# Patient Record
Sex: MALE | Race: WHITE | NOT HISPANIC OR LATINO | Employment: OTHER | URBAN - METROPOLITAN AREA
[De-identification: names, ages, dates, MRNs, and addresses within clinical notes are randomized per-mention and may not be internally consistent; named-entity substitution may affect disease eponyms.]

---

## 2017-01-26 ENCOUNTER — RECORDS - HEALTHEAST (OUTPATIENT)
Dept: LAB | Facility: CLINIC | Age: 77
End: 2017-01-26

## 2017-01-26 LAB
CHOLEST SERPL-MCNC: 124 MG/DL
FASTING STATUS PATIENT QL REPORTED: YES
HDLC SERPL-MCNC: 37 MG/DL
LDLC SERPL CALC-MCNC: 75 MG/DL
TRIGL SERPL-MCNC: 61 MG/DL

## 2017-04-11 ENCOUNTER — RECORDS - HEALTHEAST (OUTPATIENT)
Dept: LAB | Facility: CLINIC | Age: 77
End: 2017-04-11

## 2017-04-11 LAB
APPEARANCE FLD: ABNORMAL
COLOR FLD: YELLOW
CRYSTALS SNV MICRO: ABNORMAL
LYMPHOCYTES NFR FLD MANUAL: 1 %
MONOCYTE % - HISTORICAL: 3 %
NEUTS BAND NFR FLD MANUAL: 97 %
RBC FLUID - HISTORICAL: ABNORMAL /UL
WBC # FLD AUTO: ABNORMAL /UL (ref 0–99)

## 2017-05-23 ENCOUNTER — AMBULATORY - HEALTHEAST (OUTPATIENT)
Dept: CARDIOLOGY | Facility: CLINIC | Age: 77
End: 2017-05-23

## 2017-05-24 ENCOUNTER — AMBULATORY - HEALTHEAST (OUTPATIENT)
Dept: CARDIOLOGY | Facility: CLINIC | Age: 77
End: 2017-05-24

## 2017-05-24 ENCOUNTER — OFFICE VISIT - HEALTHEAST (OUTPATIENT)
Dept: CARDIOLOGY | Facility: CLINIC | Age: 77
End: 2017-05-24

## 2017-05-24 DIAGNOSIS — E78.5 DYSLIPIDEMIA: ICD-10-CM

## 2017-05-24 DIAGNOSIS — I47.10 SVT (SUPRAVENTRICULAR TACHYCARDIA) (H): ICD-10-CM

## 2017-05-24 DIAGNOSIS — I10 ESSENTIAL HYPERTENSION: ICD-10-CM

## 2017-05-24 DIAGNOSIS — I25.10 CORONARY ARTERY DISEASE INVOLVING NATIVE CORONARY ARTERY OF NATIVE HEART WITHOUT ANGINA PECTORIS: ICD-10-CM

## 2017-05-24 ASSESSMENT — MIFFLIN-ST. JEOR: SCORE: 1445.61

## 2017-06-12 ENCOUNTER — COMMUNICATION - HEALTHEAST (OUTPATIENT)
Dept: CARDIOLOGY | Facility: CLINIC | Age: 77
End: 2017-06-12

## 2017-06-12 DIAGNOSIS — I10 HYPERTENSION: ICD-10-CM

## 2017-10-16 ENCOUNTER — RECORDS - HEALTHEAST (OUTPATIENT)
Dept: LAB | Facility: CLINIC | Age: 77
End: 2017-10-16

## 2017-10-16 LAB
CHOLEST SERPL-MCNC: 125 MG/DL
FASTING STATUS PATIENT QL REPORTED: YES
HDLC SERPL-MCNC: 43 MG/DL
LDLC SERPL CALC-MCNC: 73 MG/DL
TRIGL SERPL-MCNC: 43 MG/DL

## 2018-03-11 ENCOUNTER — COMMUNICATION - HEALTHEAST (OUTPATIENT)
Dept: CARDIOLOGY | Facility: CLINIC | Age: 78
End: 2018-03-11

## 2018-03-11 DIAGNOSIS — I10 HYPERTENSION: ICD-10-CM

## 2018-04-10 ENCOUNTER — OFFICE VISIT - HEALTHEAST (OUTPATIENT)
Dept: CARDIOLOGY | Facility: CLINIC | Age: 78
End: 2018-04-10

## 2018-04-10 ENCOUNTER — AMBULATORY - HEALTHEAST (OUTPATIENT)
Dept: CARDIOLOGY | Facility: CLINIC | Age: 78
End: 2018-04-10

## 2018-04-10 ENCOUNTER — RECORDS - HEALTHEAST (OUTPATIENT)
Dept: ADMINISTRATIVE | Facility: OTHER | Age: 78
End: 2018-04-10

## 2018-04-10 DIAGNOSIS — E78.5 DYSLIPIDEMIA: ICD-10-CM

## 2018-04-10 DIAGNOSIS — I25.10 CORONARY ARTERY DISEASE INVOLVING NATIVE CORONARY ARTERY OF NATIVE HEART WITHOUT ANGINA PECTORIS: ICD-10-CM

## 2018-04-10 DIAGNOSIS — I47.10 SVT (SUPRAVENTRICULAR TACHYCARDIA) (H): ICD-10-CM

## 2018-04-10 DIAGNOSIS — I10 ESSENTIAL HYPERTENSION: ICD-10-CM

## 2018-04-10 ASSESSMENT — MIFFLIN-ST. JEOR: SCORE: 1445.61

## 2018-04-16 ENCOUNTER — HOSPITAL ENCOUNTER (OUTPATIENT)
Dept: CARDIOLOGY | Facility: CLINIC | Age: 78
Discharge: HOME | End: 2018-04-16
Attending: INTERNAL MEDICINE

## 2018-04-16 DIAGNOSIS — I47.10 SVT (SUPRAVENTRICULAR TACHYCARDIA) (H): ICD-10-CM

## 2018-04-18 ENCOUNTER — RECORDS - HEALTHEAST (OUTPATIENT)
Dept: LAB | Facility: CLINIC | Age: 78
End: 2018-04-18

## 2018-04-18 LAB
ALBUMIN SERPL-MCNC: 3.5 G/DL (ref 3.5–5)
ALP SERPL-CCNC: 129 U/L (ref 45–120)
ALT SERPL W P-5'-P-CCNC: 14 U/L (ref 0–45)
ANION GAP SERPL CALCULATED.3IONS-SCNC: 8 MMOL/L (ref 5–18)
AST SERPL W P-5'-P-CCNC: 21 U/L (ref 0–40)
BILIRUB SERPL-MCNC: 0.9 MG/DL (ref 0–1)
BUN SERPL-MCNC: 21 MG/DL (ref 8–28)
CALCIUM SERPL-MCNC: 9 MG/DL (ref 8.5–10.5)
CHLORIDE BLD-SCNC: 101 MMOL/L (ref 98–107)
CHOLEST SERPL-MCNC: 108 MG/DL
CO2 SERPL-SCNC: 26 MMOL/L (ref 22–31)
CREAT SERPL-MCNC: 0.83 MG/DL (ref 0.7–1.3)
FASTING STATUS PATIENT QL REPORTED: YES
GFR SERPL CREATININE-BSD FRML MDRD: >60 ML/MIN/1.73M2
GLUCOSE BLD-MCNC: 82 MG/DL (ref 70–125)
HDLC SERPL-MCNC: 40 MG/DL
LDLC SERPL CALC-MCNC: 59 MG/DL
POTASSIUM BLD-SCNC: 5.2 MMOL/L (ref 3.5–5)
PROT SERPL-MCNC: 7 G/DL (ref 6–8)
SODIUM SERPL-SCNC: 135 MMOL/L (ref 136–145)
TRIGL SERPL-MCNC: 46 MG/DL

## 2018-05-25 ENCOUNTER — RECORDS - HEALTHEAST (OUTPATIENT)
Dept: ADMINISTRATIVE | Facility: OTHER | Age: 78
End: 2018-05-25

## 2018-05-29 ENCOUNTER — AMBULATORY - HEALTHEAST (OUTPATIENT)
Dept: CARDIOLOGY | Facility: CLINIC | Age: 78
End: 2018-05-29

## 2018-05-29 ENCOUNTER — RECORDS - HEALTHEAST (OUTPATIENT)
Dept: ADMINISTRATIVE | Facility: OTHER | Age: 78
End: 2018-05-29

## 2018-06-01 ENCOUNTER — OFFICE VISIT - HEALTHEAST (OUTPATIENT)
Dept: CARDIOLOGY | Facility: CLINIC | Age: 78
End: 2018-06-01

## 2018-06-01 DIAGNOSIS — I49.3 PVC (PREMATURE VENTRICULAR CONTRACTION): ICD-10-CM

## 2018-06-01 DIAGNOSIS — I25.10 ATHEROSCLEROSIS OF NATIVE CORONARY ARTERY OF NATIVE HEART WITHOUT ANGINA PECTORIS: ICD-10-CM

## 2018-06-01 LAB
ATRIAL RATE - MUSE: 55 BPM
DIASTOLIC BLOOD PRESSURE - MUSE: NORMAL MMHG
INTERPRETATION ECG - MUSE: NORMAL
P AXIS - MUSE: 59 DEGREES
PR INTERVAL - MUSE: 244 MS
QRS DURATION - MUSE: 96 MS
QT - MUSE: 418 MS
QTC - MUSE: 399 MS
R AXIS - MUSE: 62 DEGREES
SYSTOLIC BLOOD PRESSURE - MUSE: NORMAL MMHG
T AXIS - MUSE: 24 DEGREES
VENTRICULAR RATE- MUSE: 55 BPM

## 2018-06-01 ASSESSMENT — MIFFLIN-ST. JEOR: SCORE: 1436.54

## 2018-06-08 ENCOUNTER — COMMUNICATION - HEALTHEAST (OUTPATIENT)
Dept: CARDIOLOGY | Facility: CLINIC | Age: 78
End: 2018-06-08

## 2018-06-08 DIAGNOSIS — I10 HYPERTENSION: ICD-10-CM

## 2018-06-22 ENCOUNTER — HOSPITAL ENCOUNTER (OUTPATIENT)
Dept: NUCLEAR MEDICINE | Facility: CLINIC | Age: 78
Discharge: HOME | End: 2018-06-22
Attending: INTERNAL MEDICINE

## 2018-06-22 ENCOUNTER — HOSPITAL ENCOUNTER (OUTPATIENT)
Dept: CARDIOLOGY | Facility: CLINIC | Age: 78
Discharge: HOME | End: 2018-06-22
Attending: INTERNAL MEDICINE

## 2018-06-22 DIAGNOSIS — I25.10 ATHEROSCLEROSIS OF NATIVE CORONARY ARTERY OF NATIVE HEART WITHOUT ANGINA PECTORIS: ICD-10-CM

## 2018-06-22 DIAGNOSIS — I49.3 PVC (PREMATURE VENTRICULAR CONTRACTION): ICD-10-CM

## 2018-06-22 LAB
CV STRESS MAX HR HE: 82
NUC STRESS EJECTION FRACTION: 66 %
STRESS ECHO BASELINE BP: NORMAL MM OF HG
STRESS ECHO BASELINE HR: 64 BPM
STRESS ECHO CALCULATED PERCENT HR: 58 %
STRESS ECHO LAST STRESS BP: NORMAL MM OF HG

## 2018-06-26 ENCOUNTER — AMBULATORY - HEALTHEAST (OUTPATIENT)
Dept: CARDIOLOGY | Facility: CLINIC | Age: 78
End: 2018-06-26

## 2018-06-26 ENCOUNTER — SURGERY - HEALTHEAST (OUTPATIENT)
Dept: CARDIOLOGY | Facility: CLINIC | Age: 78
End: 2018-06-26

## 2018-06-26 DIAGNOSIS — I25.10 CAD (CORONARY ARTERY DISEASE): ICD-10-CM

## 2018-06-27 ENCOUNTER — COMMUNICATION - HEALTHEAST (OUTPATIENT)
Dept: CARDIOLOGY | Facility: CLINIC | Age: 78
End: 2018-06-27

## 2018-06-27 ENCOUNTER — SURGERY - HEALTHEAST (OUTPATIENT)
Dept: CARDIOLOGY | Facility: CLINIC | Age: 78
End: 2018-06-27

## 2018-06-27 DIAGNOSIS — I25.10 ATHEROSCLEROSIS OF NATIVE CORONARY ARTERY OF NATIVE HEART WITHOUT ANGINA PECTORIS: ICD-10-CM

## 2018-06-27 DIAGNOSIS — I49.3 PVC (PREMATURE VENTRICULAR CONTRACTION): ICD-10-CM

## 2018-06-29 ENCOUNTER — COMMUNICATION - HEALTHEAST (OUTPATIENT)
Dept: CARDIOLOGY | Facility: CLINIC | Age: 78
End: 2018-06-29

## 2018-06-29 ENCOUNTER — AMBULATORY - HEALTHEAST (OUTPATIENT)
Dept: CARDIOLOGY | Facility: CLINIC | Age: 78
End: 2018-06-29

## 2018-06-29 ENCOUNTER — SURGERY - HEALTHEAST (OUTPATIENT)
Dept: CARDIOLOGY | Facility: CLINIC | Age: 78
End: 2018-06-29

## 2018-06-29 DIAGNOSIS — R06.09 DOE (DYSPNEA ON EXERTION): ICD-10-CM

## 2018-06-29 DIAGNOSIS — I25.10 CAD (CORONARY ARTERY DISEASE): ICD-10-CM

## 2018-06-29 DIAGNOSIS — R94.39 ABNORMAL NUCLEAR STRESS TEST: ICD-10-CM

## 2018-06-29 ASSESSMENT — MIFFLIN-ST. JEOR: SCORE: 1426.56

## 2018-07-02 ENCOUNTER — COMMUNICATION - HEALTHEAST (OUTPATIENT)
Dept: CARDIOLOGY | Facility: CLINIC | Age: 78
End: 2018-07-02

## 2018-07-03 ENCOUNTER — SURGERY - HEALTHEAST (OUTPATIENT)
Dept: CARDIOLOGY | Facility: CLINIC | Age: 78
End: 2018-07-03

## 2018-07-03 ENCOUNTER — RECORDS - HEALTHEAST (OUTPATIENT)
Dept: ADMINISTRATIVE | Facility: OTHER | Age: 78
End: 2018-07-03

## 2018-07-05 ENCOUNTER — SURGERY - HEALTHEAST (OUTPATIENT)
Dept: CARDIOLOGY | Facility: CLINIC | Age: 78
End: 2018-07-05

## 2018-07-05 ASSESSMENT — MIFFLIN-ST. JEOR: SCORE: 1422.94

## 2018-07-06 ASSESSMENT — MIFFLIN-ST. JEOR: SCORE: 1407.06

## 2018-07-13 ENCOUNTER — OFFICE VISIT - HEALTHEAST (OUTPATIENT)
Dept: CARDIOLOGY | Facility: CLINIC | Age: 78
End: 2018-07-13

## 2018-07-13 DIAGNOSIS — I10 ESSENTIAL HYPERTENSION: ICD-10-CM

## 2018-07-13 DIAGNOSIS — E78.5 HYPERLIPIDEMIA LDL GOAL <70: ICD-10-CM

## 2018-07-13 DIAGNOSIS — I25.10 CAD (CORONARY ARTERY DISEASE): ICD-10-CM

## 2018-07-13 DIAGNOSIS — I25.119 CORONARY ARTERY DISEASE INVOLVING NATIVE CORONARY ARTERY OF NATIVE HEART WITH ANGINA PECTORIS (H): ICD-10-CM

## 2018-07-13 DIAGNOSIS — R06.09 DYSPNEA ON EXERTION: ICD-10-CM

## 2018-07-13 DIAGNOSIS — R94.39 ABNORMAL NUCLEAR STRESS TEST: ICD-10-CM

## 2018-07-13 ASSESSMENT — MIFFLIN-ST. JEOR: SCORE: 1427.47

## 2018-07-23 ENCOUNTER — AMBULATORY - HEALTHEAST (OUTPATIENT)
Dept: CARDIAC REHAB | Facility: CLINIC | Age: 78
End: 2018-07-23

## 2018-07-23 DIAGNOSIS — R06.09 DOE (DYSPNEA ON EXERTION): ICD-10-CM

## 2018-07-23 DIAGNOSIS — R94.39 ABNORMAL NUCLEAR STRESS TEST: ICD-10-CM

## 2018-07-23 DIAGNOSIS — I25.10 CAD (CORONARY ARTERY DISEASE): ICD-10-CM

## 2018-07-23 DIAGNOSIS — Z95.5 S/P DRUG ELUTING CORONARY STENT PLACEMENT: ICD-10-CM

## 2018-07-25 ENCOUNTER — AMBULATORY - HEALTHEAST (OUTPATIENT)
Dept: CARDIAC REHAB | Facility: CLINIC | Age: 78
End: 2018-07-25

## 2018-07-25 DIAGNOSIS — Z95.5 S/P DRUG ELUTING CORONARY STENT PLACEMENT: ICD-10-CM

## 2018-07-27 ENCOUNTER — AMBULATORY - HEALTHEAST (OUTPATIENT)
Dept: CARDIAC REHAB | Facility: CLINIC | Age: 78
End: 2018-07-27

## 2018-07-27 DIAGNOSIS — Z95.5 S/P DRUG ELUTING CORONARY STENT PLACEMENT: ICD-10-CM

## 2018-08-01 ENCOUNTER — AMBULATORY - HEALTHEAST (OUTPATIENT)
Dept: CARDIAC REHAB | Facility: CLINIC | Age: 78
End: 2018-08-01

## 2018-08-01 DIAGNOSIS — Z95.5 S/P DRUG ELUTING CORONARY STENT PLACEMENT: ICD-10-CM

## 2018-08-06 ENCOUNTER — AMBULATORY - HEALTHEAST (OUTPATIENT)
Dept: CARDIAC REHAB | Facility: CLINIC | Age: 78
End: 2018-08-06

## 2018-08-06 DIAGNOSIS — Z95.5 S/P DRUG ELUTING CORONARY STENT PLACEMENT: ICD-10-CM

## 2018-08-08 ENCOUNTER — AMBULATORY - HEALTHEAST (OUTPATIENT)
Dept: CARDIAC REHAB | Facility: CLINIC | Age: 78
End: 2018-08-08

## 2018-08-08 DIAGNOSIS — Z95.5 S/P DRUG ELUTING CORONARY STENT PLACEMENT: ICD-10-CM

## 2018-08-13 ENCOUNTER — AMBULATORY - HEALTHEAST (OUTPATIENT)
Dept: CARDIAC REHAB | Facility: CLINIC | Age: 78
End: 2018-08-13

## 2018-08-13 DIAGNOSIS — Z95.5 S/P DRUG ELUTING CORONARY STENT PLACEMENT: ICD-10-CM

## 2018-08-14 ENCOUNTER — AMBULATORY - HEALTHEAST (OUTPATIENT)
Dept: CARDIOLOGY | Facility: CLINIC | Age: 78
End: 2018-08-14

## 2018-08-14 ENCOUNTER — RECORDS - HEALTHEAST (OUTPATIENT)
Dept: ADMINISTRATIVE | Facility: OTHER | Age: 78
End: 2018-08-14

## 2018-08-15 ENCOUNTER — AMBULATORY - HEALTHEAST (OUTPATIENT)
Dept: CARDIAC REHAB | Facility: CLINIC | Age: 78
End: 2018-08-15

## 2018-08-15 DIAGNOSIS — Z95.5 S/P DRUG ELUTING CORONARY STENT PLACEMENT: ICD-10-CM

## 2018-08-16 ENCOUNTER — OFFICE VISIT - HEALTHEAST (OUTPATIENT)
Dept: CARDIOLOGY | Facility: CLINIC | Age: 78
End: 2018-08-16

## 2018-08-16 DIAGNOSIS — I25.10 CORONARY ARTERY DISEASE INVOLVING NATIVE CORONARY ARTERY OF NATIVE HEART WITHOUT ANGINA PECTORIS: ICD-10-CM

## 2018-08-16 DIAGNOSIS — I10 ESSENTIAL HYPERTENSION: ICD-10-CM

## 2018-08-16 DIAGNOSIS — E78.5 DYSLIPIDEMIA: ICD-10-CM

## 2018-08-16 DIAGNOSIS — I47.10 SVT (SUPRAVENTRICULAR TACHYCARDIA) (H): ICD-10-CM

## 2018-08-16 ASSESSMENT — MIFFLIN-ST. JEOR: SCORE: 1432.01

## 2018-08-20 ENCOUNTER — AMBULATORY - HEALTHEAST (OUTPATIENT)
Dept: CARDIAC REHAB | Facility: CLINIC | Age: 78
End: 2018-08-20

## 2018-08-20 DIAGNOSIS — Z95.5 S/P DRUG ELUTING CORONARY STENT PLACEMENT: ICD-10-CM

## 2018-08-22 ENCOUNTER — AMBULATORY - HEALTHEAST (OUTPATIENT)
Dept: CARDIAC REHAB | Facility: CLINIC | Age: 78
End: 2018-08-22

## 2018-08-22 DIAGNOSIS — Z95.5 S/P DRUG ELUTING CORONARY STENT PLACEMENT: ICD-10-CM

## 2018-08-27 ENCOUNTER — AMBULATORY - HEALTHEAST (OUTPATIENT)
Dept: CARDIAC REHAB | Facility: CLINIC | Age: 78
End: 2018-08-27

## 2018-08-27 DIAGNOSIS — Z95.5 S/P DRUG ELUTING CORONARY STENT PLACEMENT: ICD-10-CM

## 2018-08-29 ENCOUNTER — AMBULATORY - HEALTHEAST (OUTPATIENT)
Dept: CARDIAC REHAB | Facility: CLINIC | Age: 78
End: 2018-08-29

## 2018-08-29 DIAGNOSIS — Z95.5 S/P DRUG ELUTING CORONARY STENT PLACEMENT: ICD-10-CM

## 2018-09-05 ENCOUNTER — AMBULATORY - HEALTHEAST (OUTPATIENT)
Dept: CARDIAC REHAB | Facility: CLINIC | Age: 78
End: 2018-09-05

## 2018-09-05 DIAGNOSIS — Z95.5 S/P DRUG ELUTING CORONARY STENT PLACEMENT: ICD-10-CM

## 2018-09-10 ENCOUNTER — AMBULATORY - HEALTHEAST (OUTPATIENT)
Dept: CARDIAC REHAB | Facility: CLINIC | Age: 78
End: 2018-09-10

## 2018-09-10 DIAGNOSIS — Z95.5 S/P DRUG ELUTING CORONARY STENT PLACEMENT: ICD-10-CM

## 2018-09-12 ENCOUNTER — AMBULATORY - HEALTHEAST (OUTPATIENT)
Dept: CARDIAC REHAB | Facility: CLINIC | Age: 78
End: 2018-09-12

## 2018-09-12 DIAGNOSIS — Z95.5 S/P DRUG ELUTING CORONARY STENT PLACEMENT: ICD-10-CM

## 2018-09-17 ENCOUNTER — AMBULATORY - HEALTHEAST (OUTPATIENT)
Dept: CARDIAC REHAB | Facility: CLINIC | Age: 78
End: 2018-09-17

## 2018-09-17 DIAGNOSIS — Z95.5 S/P DRUG ELUTING CORONARY STENT PLACEMENT: ICD-10-CM

## 2018-09-19 ENCOUNTER — AMBULATORY - HEALTHEAST (OUTPATIENT)
Dept: CARDIAC REHAB | Facility: CLINIC | Age: 78
End: 2018-09-19

## 2018-09-19 DIAGNOSIS — Z95.5 S/P DRUG ELUTING CORONARY STENT PLACEMENT: ICD-10-CM

## 2018-09-24 ENCOUNTER — AMBULATORY - HEALTHEAST (OUTPATIENT)
Dept: CARDIAC REHAB | Facility: CLINIC | Age: 78
End: 2018-09-24

## 2018-09-24 DIAGNOSIS — Z95.5 S/P DRUG ELUTING CORONARY STENT PLACEMENT: ICD-10-CM

## 2018-09-26 ENCOUNTER — AMBULATORY - HEALTHEAST (OUTPATIENT)
Dept: CARDIAC REHAB | Facility: CLINIC | Age: 78
End: 2018-09-26

## 2018-09-26 DIAGNOSIS — Z95.5 S/P DRUG ELUTING CORONARY STENT PLACEMENT: ICD-10-CM

## 2018-10-01 ENCOUNTER — AMBULATORY - HEALTHEAST (OUTPATIENT)
Dept: CARDIAC REHAB | Facility: CLINIC | Age: 78
End: 2018-10-01

## 2018-10-01 DIAGNOSIS — Z95.5 S/P DRUG ELUTING CORONARY STENT PLACEMENT: ICD-10-CM

## 2018-10-03 ENCOUNTER — AMBULATORY - HEALTHEAST (OUTPATIENT)
Dept: CARDIAC REHAB | Facility: CLINIC | Age: 78
End: 2018-10-03

## 2018-10-03 DIAGNOSIS — Z95.5 S/P DRUG ELUTING CORONARY STENT PLACEMENT: ICD-10-CM

## 2018-10-25 ENCOUNTER — COMMUNICATION - HEALTHEAST (OUTPATIENT)
Dept: ADMINISTRATIVE | Facility: CLINIC | Age: 78
End: 2018-10-25

## 2018-10-31 ENCOUNTER — COMMUNICATION - HEALTHEAST (OUTPATIENT)
Dept: CARDIOLOGY | Facility: CLINIC | Age: 78
End: 2018-10-31

## 2018-10-31 DIAGNOSIS — I47.10 PAROXYSMAL SUPRAVENTRICULAR TACHYCARDIA (H): ICD-10-CM

## 2018-12-04 ENCOUNTER — RECORDS - HEALTHEAST (OUTPATIENT)
Dept: LAB | Facility: CLINIC | Age: 78
End: 2018-12-04

## 2018-12-04 LAB
ALBUMIN SERPL-MCNC: 3.8 G/DL (ref 3.5–5)
ALP SERPL-CCNC: 114 U/L (ref 45–120)
ALT SERPL W P-5'-P-CCNC: 17 U/L (ref 0–45)
ANION GAP SERPL CALCULATED.3IONS-SCNC: 7 MMOL/L (ref 5–18)
AST SERPL W P-5'-P-CCNC: 21 U/L (ref 0–40)
BILIRUB SERPL-MCNC: 0.9 MG/DL (ref 0–1)
BUN SERPL-MCNC: 13 MG/DL (ref 8–28)
CALCIUM SERPL-MCNC: 9.2 MG/DL (ref 8.5–10.5)
CHLORIDE BLD-SCNC: 100 MMOL/L (ref 98–107)
CHOLEST SERPL-MCNC: 115 MG/DL
CO2 SERPL-SCNC: 27 MMOL/L (ref 22–31)
CREAT SERPL-MCNC: 0.78 MG/DL (ref 0.7–1.3)
DIGOXIN LEVEL LHE- HISTORICAL: <0.3 NG/ML (ref 0.5–2)
FASTING STATUS PATIENT QL REPORTED: YES
GFR SERPL CREATININE-BSD FRML MDRD: >60 ML/MIN/1.73M2
GLUCOSE BLD-MCNC: 92 MG/DL (ref 70–125)
HDLC SERPL-MCNC: 41 MG/DL
LDLC SERPL CALC-MCNC: 63 MG/DL
POTASSIUM BLD-SCNC: 4.5 MMOL/L (ref 3.5–5)
PROT SERPL-MCNC: 7 G/DL (ref 6–8)
SODIUM SERPL-SCNC: 134 MMOL/L (ref 136–145)
TRIGL SERPL-MCNC: 54 MG/DL

## 2019-01-16 ENCOUNTER — OFFICE VISIT - HEALTHEAST (OUTPATIENT)
Dept: CARDIOLOGY | Facility: CLINIC | Age: 79
End: 2019-01-16

## 2019-01-16 DIAGNOSIS — I25.119 CORONARY ARTERY DISEASE INVOLVING NATIVE CORONARY ARTERY OF NATIVE HEART WITH ANGINA PECTORIS (H): ICD-10-CM

## 2019-01-16 DIAGNOSIS — I49.3 PVC (PREMATURE VENTRICULAR CONTRACTION): ICD-10-CM

## 2019-01-16 DIAGNOSIS — I47.10 SVT (SUPRAVENTRICULAR TACHYCARDIA) (H): ICD-10-CM

## 2019-01-16 ASSESSMENT — MIFFLIN-ST. JEOR: SCORE: 1440.63

## 2019-01-17 ENCOUNTER — COMMUNICATION - HEALTHEAST (OUTPATIENT)
Dept: CARDIOLOGY | Facility: CLINIC | Age: 79
End: 2019-01-17

## 2019-01-17 ENCOUNTER — AMBULATORY - HEALTHEAST (OUTPATIENT)
Dept: CARDIOLOGY | Facility: CLINIC | Age: 79
End: 2019-01-17

## 2019-01-17 DIAGNOSIS — I47.10 SVT (SUPRAVENTRICULAR TACHYCARDIA) (H): ICD-10-CM

## 2019-01-17 DIAGNOSIS — I48.91 A-FIB (H): ICD-10-CM

## 2019-01-18 ENCOUNTER — COMMUNICATION - HEALTHEAST (OUTPATIENT)
Dept: CARDIOLOGY | Facility: CLINIC | Age: 79
End: 2019-01-18

## 2019-01-18 DIAGNOSIS — I47.10 SVT (SUPRAVENTRICULAR TACHYCARDIA) (H): ICD-10-CM

## 2019-03-10 ENCOUNTER — COMMUNICATION - HEALTHEAST (OUTPATIENT)
Dept: CARDIOLOGY | Facility: CLINIC | Age: 79
End: 2019-03-10

## 2019-03-10 DIAGNOSIS — I10 HYPERTENSION: ICD-10-CM

## 2019-03-29 ENCOUNTER — RECORDS - HEALTHEAST (OUTPATIENT)
Dept: ADMINISTRATIVE | Facility: OTHER | Age: 79
End: 2019-03-29

## 2019-03-29 ENCOUNTER — AMBULATORY - HEALTHEAST (OUTPATIENT)
Dept: CARDIOLOGY | Facility: CLINIC | Age: 79
End: 2019-03-29

## 2019-04-10 ENCOUNTER — OFFICE VISIT - HEALTHEAST (OUTPATIENT)
Dept: CARDIOLOGY | Facility: CLINIC | Age: 79
End: 2019-04-10

## 2019-04-10 DIAGNOSIS — I25.119 CORONARY ARTERY DISEASE INVOLVING NATIVE CORONARY ARTERY OF NATIVE HEART WITH ANGINA PECTORIS (H): ICD-10-CM

## 2019-04-10 DIAGNOSIS — I49.3 PVC (PREMATURE VENTRICULAR CONTRACTION): ICD-10-CM

## 2019-04-10 DIAGNOSIS — I47.10 SVT (SUPRAVENTRICULAR TACHYCARDIA) (H): ICD-10-CM

## 2019-04-10 ASSESSMENT — MIFFLIN-ST. JEOR: SCORE: 1432.01

## 2019-04-11 ENCOUNTER — AMBULATORY - HEALTHEAST (OUTPATIENT)
Dept: CARDIOLOGY | Facility: CLINIC | Age: 79
End: 2019-04-11

## 2019-04-12 ENCOUNTER — AMBULATORY - HEALTHEAST (OUTPATIENT)
Dept: CARDIOLOGY | Facility: CLINIC | Age: 79
End: 2019-04-12

## 2019-04-12 ENCOUNTER — SURGERY - HEALTHEAST (OUTPATIENT)
Dept: CARDIOLOGY | Facility: CLINIC | Age: 79
End: 2019-04-12

## 2019-04-12 DIAGNOSIS — I47.10 SVT (SUPRAVENTRICULAR TACHYCARDIA) (H): ICD-10-CM

## 2019-04-18 ENCOUNTER — COMMUNICATION - HEALTHEAST (OUTPATIENT)
Dept: CARDIOLOGY | Facility: CLINIC | Age: 79
End: 2019-04-18

## 2019-04-26 ENCOUNTER — COMMUNICATION - HEALTHEAST (OUTPATIENT)
Dept: CARDIOLOGY | Facility: CLINIC | Age: 79
End: 2019-04-26

## 2019-05-07 ENCOUNTER — SURGERY - HEALTHEAST (OUTPATIENT)
Dept: CARDIOLOGY | Facility: CLINIC | Age: 79
End: 2019-05-07

## 2019-05-07 ASSESSMENT — MIFFLIN-ST. JEOR: SCORE: 1441.99

## 2019-06-11 ENCOUNTER — RECORDS - HEALTHEAST (OUTPATIENT)
Dept: LAB | Facility: CLINIC | Age: 79
End: 2019-06-11

## 2019-06-11 LAB
ALBUMIN SERPL-MCNC: 3.8 G/DL (ref 3.5–5)
ALP SERPL-CCNC: 106 U/L (ref 45–120)
ALT SERPL W P-5'-P-CCNC: 17 U/L (ref 0–45)
ANION GAP SERPL CALCULATED.3IONS-SCNC: 11 MMOL/L (ref 5–18)
AST SERPL W P-5'-P-CCNC: 21 U/L (ref 0–40)
BILIRUB SERPL-MCNC: 0.8 MG/DL (ref 0–1)
BUN SERPL-MCNC: 18 MG/DL (ref 8–28)
CALCIUM SERPL-MCNC: 9.2 MG/DL (ref 8.5–10.5)
CHLORIDE BLD-SCNC: 102 MMOL/L (ref 98–107)
CHOLEST SERPL-MCNC: 116 MG/DL
CO2 SERPL-SCNC: 24 MMOL/L (ref 22–31)
CREAT SERPL-MCNC: 0.84 MG/DL (ref 0.7–1.3)
FASTING STATUS PATIENT QL REPORTED: YES
GFR SERPL CREATININE-BSD FRML MDRD: >60 ML/MIN/1.73M2
GLUCOSE BLD-MCNC: 78 MG/DL (ref 70–125)
HDLC SERPL-MCNC: 41 MG/DL
LDLC SERPL CALC-MCNC: 63 MG/DL
POTASSIUM BLD-SCNC: 4.6 MMOL/L (ref 3.5–5)
PROT SERPL-MCNC: 7 G/DL (ref 6–8)
SODIUM SERPL-SCNC: 137 MMOL/L (ref 136–145)
TRIGL SERPL-MCNC: 59 MG/DL

## 2019-07-05 ENCOUNTER — RECORDS - HEALTHEAST (OUTPATIENT)
Dept: ADMINISTRATIVE | Facility: OTHER | Age: 79
End: 2019-07-05

## 2019-07-05 ENCOUNTER — AMBULATORY - HEALTHEAST (OUTPATIENT)
Dept: CARDIOLOGY | Facility: CLINIC | Age: 79
End: 2019-07-05

## 2019-07-08 ENCOUNTER — OFFICE VISIT - HEALTHEAST (OUTPATIENT)
Dept: CARDIOLOGY | Facility: CLINIC | Age: 79
End: 2019-07-08

## 2019-07-08 DIAGNOSIS — I10 ESSENTIAL HYPERTENSION: ICD-10-CM

## 2019-07-08 DIAGNOSIS — I25.10 CORONARY ARTERY DISEASE INVOLVING NATIVE CORONARY ARTERY OF NATIVE HEART WITHOUT ANGINA PECTORIS: ICD-10-CM

## 2019-07-08 DIAGNOSIS — I47.10 SVT (SUPRAVENTRICULAR TACHYCARDIA) (H): ICD-10-CM

## 2019-07-08 ASSESSMENT — MIFFLIN-ST. JEOR: SCORE: 1448.16

## 2019-11-23 ENCOUNTER — COMMUNICATION - HEALTHEAST (OUTPATIENT)
Dept: CARDIOLOGY | Facility: CLINIC | Age: 79
End: 2019-11-23

## 2019-11-23 DIAGNOSIS — I10 HYPERTENSION: ICD-10-CM

## 2019-12-12 ENCOUNTER — RECORDS - HEALTHEAST (OUTPATIENT)
Dept: LAB | Facility: CLINIC | Age: 79
End: 2019-12-12

## 2019-12-12 LAB
ALBUMIN SERPL-MCNC: 3.7 G/DL (ref 3.5–5)
ALP SERPL-CCNC: 120 U/L (ref 45–120)
ALT SERPL W P-5'-P-CCNC: 16 U/L (ref 0–45)
ANION GAP SERPL CALCULATED.3IONS-SCNC: 10 MMOL/L (ref 5–18)
AST SERPL W P-5'-P-CCNC: 23 U/L (ref 0–40)
BILIRUB SERPL-MCNC: 0.9 MG/DL (ref 0–1)
BUN SERPL-MCNC: 19 MG/DL (ref 8–28)
CALCIUM SERPL-MCNC: 8.8 MG/DL (ref 8.5–10.5)
CHLORIDE BLD-SCNC: 103 MMOL/L (ref 98–107)
CHOLEST SERPL-MCNC: 105 MG/DL
CO2 SERPL-SCNC: 23 MMOL/L (ref 22–31)
CREAT SERPL-MCNC: 0.96 MG/DL (ref 0.7–1.3)
FASTING STATUS PATIENT QL REPORTED: YES
GFR SERPL CREATININE-BSD FRML MDRD: >60 ML/MIN/1.73M2
GLUCOSE BLD-MCNC: 81 MG/DL (ref 70–125)
HDLC SERPL-MCNC: 38 MG/DL
LDLC SERPL CALC-MCNC: 57 MG/DL
POTASSIUM BLD-SCNC: 4.7 MMOL/L (ref 3.5–5)
PROT SERPL-MCNC: 6.7 G/DL (ref 6–8)
SODIUM SERPL-SCNC: 136 MMOL/L (ref 136–145)
TRIGL SERPL-MCNC: 50 MG/DL

## 2020-02-22 ENCOUNTER — COMMUNICATION - HEALTHEAST (OUTPATIENT)
Dept: CARDIOLOGY | Facility: CLINIC | Age: 80
End: 2020-02-22

## 2020-02-22 DIAGNOSIS — I10 HYPERTENSION: ICD-10-CM

## 2020-06-16 ENCOUNTER — RECORDS - HEALTHEAST (OUTPATIENT)
Dept: LAB | Facility: CLINIC | Age: 80
End: 2020-06-16

## 2020-06-16 LAB
ANION GAP SERPL CALCULATED.3IONS-SCNC: 7 MMOL/L (ref 5–18)
BUN SERPL-MCNC: 17 MG/DL (ref 8–28)
CALCIUM SERPL-MCNC: 9 MG/DL (ref 8.5–10.5)
CHLORIDE BLD-SCNC: 100 MMOL/L (ref 98–107)
CO2 SERPL-SCNC: 28 MMOL/L (ref 22–31)
CREAT SERPL-MCNC: 0.89 MG/DL (ref 0.7–1.3)
GFR SERPL CREATININE-BSD FRML MDRD: >60 ML/MIN/1.73M2
GLUCOSE BLD-MCNC: 84 MG/DL (ref 70–125)
POTASSIUM BLD-SCNC: 4.9 MMOL/L (ref 3.5–5)
SODIUM SERPL-SCNC: 135 MMOL/L (ref 136–145)

## 2020-07-07 ENCOUNTER — RECORDS - HEALTHEAST (OUTPATIENT)
Dept: ADMINISTRATIVE | Facility: OTHER | Age: 80
End: 2020-07-07

## 2020-07-07 ENCOUNTER — AMBULATORY - HEALTHEAST (OUTPATIENT)
Dept: CARDIOLOGY | Facility: CLINIC | Age: 80
End: 2020-07-07

## 2020-07-08 ENCOUNTER — OFFICE VISIT - HEALTHEAST (OUTPATIENT)
Dept: CARDIOLOGY | Facility: CLINIC | Age: 80
End: 2020-07-08

## 2020-07-08 DIAGNOSIS — E78.5 DYSLIPIDEMIA: ICD-10-CM

## 2020-07-08 DIAGNOSIS — I25.10 CORONARY ARTERY DISEASE INVOLVING NATIVE CORONARY ARTERY OF NATIVE HEART WITHOUT ANGINA PECTORIS: ICD-10-CM

## 2020-07-08 DIAGNOSIS — I47.10 SVT (SUPRAVENTRICULAR TACHYCARDIA) (H): ICD-10-CM

## 2020-07-08 DIAGNOSIS — I10 ESSENTIAL HYPERTENSION: ICD-10-CM

## 2020-09-07 ENCOUNTER — COMMUNICATION - HEALTHEAST (OUTPATIENT)
Dept: CARDIOLOGY | Facility: CLINIC | Age: 80
End: 2020-09-07

## 2020-09-07 DIAGNOSIS — I10 HYPERTENSION: ICD-10-CM

## 2020-12-14 ENCOUNTER — RECORDS - HEALTHEAST (OUTPATIENT)
Dept: LAB | Facility: CLINIC | Age: 80
End: 2020-12-14

## 2020-12-14 LAB
ALBUMIN SERPL-MCNC: 4 G/DL (ref 3.5–5)
ALP SERPL-CCNC: 110 U/L (ref 45–120)
ALT SERPL W P-5'-P-CCNC: 20 U/L (ref 0–45)
ANION GAP SERPL CALCULATED.3IONS-SCNC: 10 MMOL/L (ref 5–18)
AST SERPL W P-5'-P-CCNC: 22 U/L (ref 0–40)
BILIRUB SERPL-MCNC: 0.9 MG/DL (ref 0–1)
BUN SERPL-MCNC: 18 MG/DL (ref 8–28)
CALCIUM SERPL-MCNC: 8.8 MG/DL (ref 8.5–10.5)
CHLORIDE BLD-SCNC: 100 MMOL/L (ref 98–107)
CHOLEST SERPL-MCNC: 119 MG/DL
CO2 SERPL-SCNC: 26 MMOL/L (ref 22–31)
CREAT SERPL-MCNC: 0.83 MG/DL (ref 0.7–1.3)
FASTING STATUS PATIENT QL REPORTED: YES
GFR SERPL CREATININE-BSD FRML MDRD: >60 ML/MIN/1.73M2
GLUCOSE BLD-MCNC: 89 MG/DL (ref 70–125)
HDLC SERPL-MCNC: 42 MG/DL
LDLC SERPL CALC-MCNC: 64 MG/DL
POTASSIUM BLD-SCNC: 5.2 MMOL/L (ref 3.5–5)
PROT SERPL-MCNC: 7.3 G/DL (ref 6–8)
SODIUM SERPL-SCNC: 136 MMOL/L (ref 136–145)
TRIGL SERPL-MCNC: 63 MG/DL

## 2021-03-01 ENCOUNTER — COMMUNICATION - HEALTHEAST (OUTPATIENT)
Dept: CARDIOLOGY | Facility: CLINIC | Age: 81
End: 2021-03-01

## 2021-03-01 DIAGNOSIS — I10 HYPERTENSION: ICD-10-CM

## 2021-03-02 RX ORDER — METOPROLOL TARTRATE 25 MG/1
TABLET, FILM COATED ORAL
Qty: 180 TABLET | Refills: 1 | Status: SHIPPED | OUTPATIENT
Start: 2021-03-02 | End: 2021-08-24

## 2021-05-26 ENCOUNTER — RECORDS - HEALTHEAST (OUTPATIENT)
Dept: ADMINISTRATIVE | Facility: CLINIC | Age: 81
End: 2021-05-26

## 2021-05-27 NOTE — PROGRESS NOTES
1940  Home:[phone] (home) Cell:There is no such number on file (mobile).  Emergency Contact: Herminia Butler [phone]    +++Important patient information for CSC/Cath Lab staff : None+++    [facility] EP Cath Lab Procedure Order   Ablation Type:  Supraventricular Tachycardia  Specific location of pathway: N/A     Diagnosis:  SVT  Anticipated Case Duration:  Standard   Scheduling Needs/Timeframe:  Next Available Please call pt to schedule    MD Preference: Dr Autumn HER Assist:  No Specific MD:  N/A    Current Device: None None  Device Company/Device Rep Needed for Procedure: None    Pre-Procedural Testing needed: None  Mapping System Required:  FADI  ICE Needed:  No  Special equipment/Staff needed for case: None  Anesthesia:  Conscious Sedation  Research Protocol:  No    [facility] EP Cath Lab Prep   Ordering Provider: Dr Leyva  Ordering Date: 4/12/2019  Orders Status: Intial order placed and Order set placed    H&P:  Compled by Dr. Doan on 4/10/19 if scheduled within 30 days, pt to schedule with PMD if procedure outside of this timeframe  PCP: Nikolai Monroy MD, [phone]    Pre-op Labs: Ordered AM of procedure    Medical Records Pertinent for Procedure:  Angiogram 7/5/18- LUCILLE x3 to RCA, Echo 3/26/17 EF 55% and EKG 6/5/18 SB 1st degree AVB    Patient Education:    Teach with Patient: Will be completed via phone prior to procedure, and letter was also sent to pt via mail/[employer] with written pre-procedural instructions.    Risks Reviewed:        Cardiac Catheter Ablation    <1% risk for the following: hypotension, hemorrhage, vascular injury including perforation of vein, artery or heart, thrombophlebitis, systemic or pulmonic emboli; cardiac perforation, (tamponade), infection, pneumothorax, arrhythmias, proarrhythmic effects of drugs, radiation exposure.    1-2% complete heart block (for AVNRT or septol accessory pathway).    <0.5% CVA or MI    <0.1% death    If external defibrillation is needed, 75% risk  for superficial burn.    1-2% tamponade and aortic puncture with left sided transeptal approach for left side FADI - increase risk of CVA to <2%.    Late arrhythmia recurrences depends upon the primary rhythm disturbance.      Consent: Will be obtained in [facility] day of procedure    Pre-Procedure Instructions that were Reviewed with Patient:  NPO after midnight, Remove all jewelry prior to coming in for procedure, Shower prior to arrival, Notified patient of time and date of procedure by CV , Transportation arrangements needed s/p procedure, Post-procedure follow up process, Sedation plan/orders and Pre-procedure letter was sent to pt by CV     Pre-Procedure Medication Instructions:  Instructions given to pt regarding anticoagulants: Pt is not on an anticoagulant- N/A  Instructions given to pt regarding antiarrhythmic medication: beta-blocker and diltiazem; Pt instructed to hold 1 days prior to procedure  Instructions for medication, other than anticoagulants/antiarrhythmics listed above, given to pt: to hold diltiazem and metoprolol the morning of procedure, and to take remaining medications with small sips of water    Allergies   Allergen Reactions     Amiodarone Other (See Comments)     Fatigue, shaking     Amiodarone Analogues Myalgia       Current Outpatient Medications:      aspirin 81 MG EC tablet, Take 1 tablet (81 mg total) by mouth daily., Disp: , Rfl:      atorvastatin (LIPITOR) 80 MG tablet, Take 80 mg by mouth daily., Disp: , Rfl:      betamethasone dipropionate (DIPROLENE) 0.05 % cream, Apply 1 application topically 2 (two) times a day as needed., Disp: , Rfl:      clopidogrel (PLAVIX) 75 mg tablet, Take 1 tablet (75 mg total) by mouth daily., Disp: 90 tablet, Rfl: 3     desonide (DESOWEN) 0.05 % cream, Apply topically 2 (two) times a day as needed., Disp: , Rfl:      diltiazem (CARDIZEM CD) 180 MG 24 hr capsule, Take 1 capsule (180 mg total) by mouth daily., Disp: 60 capsule, Rfl: 5      fluticasone (FLONASE) 50 mcg/actuation nasal spray, Apply 2 sprays into each nostril every evening. , Disp: , Rfl:      glucosamine sulfate (GLUCOSAMINE) 750 mg Tab, Take 1,500 mg by mouth daily. , Disp: , Rfl:      ipratropium (ATROVENT) 0.06 % nasal spray, Apply 2 sprays into each nostril 2 (two) times a day., Disp: , Rfl:      melatonin 3 mg Tab, Take 6 mg by mouth bedtime. , Disp: , Rfl:      methylcellulose (CITRUCEL), Take 2 g by mouth daily. (1 tablespoons), Disp: , Rfl:      metoprolol tartrate (LOPRESSOR) 25 MG tablet, Take 25 mg by mouth 2 (two) times a day with meals., Disp: , Rfl:      metoprolol tartrate (LOPRESSOR) 25 MG tablet, TAKE ONE TABLET BY MOUTH TWICE DAILY, Disp: 180 tablet, Rfl: 2     multivitamin (MULTIVITAMIN) per tablet, Take 1 tablet by mouth daily., Disp: , Rfl:      nitroglycerin (NITROSTAT) 0.4 MG SL tablet, Place 0.4 mg under the tongue every 5 (five) minutes as needed for chest pain., Disp: , Rfl:      omeprazole (PRILOSEC) 20 MG capsule, Take 20 mg by mouth daily., Disp: , Rfl:      sodium chloride (OCEAN) 0.65 % nasal spray, Apply 2 sprays into each nostril every morning., Disp: , Rfl:      vitamin A-vitamin C-vit E-min (OCUVITE) Tab tablet, Take 1 tablet by mouth daily., Disp: , Rfl:     Documentation Date:4/12/2019 8:48 AM  Era Mcmahon RN

## 2021-05-27 NOTE — PROGRESS NOTES
[facility] Heart Care Note    Assessment:  PVCs associated with palpitations       Holter monitors in the past have shown up to 14,000 PVCs per day       Was intolerant to empiric treatment with sotalol, amiodarone  PSVT; slow pathway modification around 2010; AVNRT  Recurrence of SVT-unusual HR = 110 bpm.  This may represent unusual pattern of AVNRT considering the slow rate  Coronary artery disease with history of myocardial infarction 1997 with two-vessel bypass graft including bypass to LAD and right coronary artery  Complex intervention of distal right coronary artery via ESPERANZA; 3 drug-eluting stents July 5, 2018  Sinus bradycardia  Preserved left ventricular function; echocardiogram March 26, 2017 showed normal ejection fraction  Excellent lipid control on high-dose atorvastatin      Plan:    Recurrent episodes of tachycardia probably represent supraventricular tachycardia and a recurrence of AVNRT  Increase diltiazem from 120 mg daily to 180 mg daily-new prescription  I think he would be a good candidate for comprehensive electrophysiologic study with ablation  We will ask the EP nurses to call you about scheduling this procedure  We will see if Dr. Leyva is available  If your tachycardia persists, he should go to the emergency room for treatment  Otherwise continue with your termination methods that have been successful          Subjective:    I had the opportunity to see.Damian Butler , who is a 78 y.o. male with a known history of SVT  He has had recurrent episodes of abrupt onset of tachycardia he had about 8 episodes since January  Episodes are on predictable unprovoked, abrupt onset and can generally be terminated with Valsalva seem to last less than 15 minutes  During tachycardia he does not feel to distress no syncopal or near syncopal episodes but does not feel real confident not sure that he can walk or do much activity during the episodes  Seem to have some improvement while taking Cardizem 120 mg  daily  No improvement with Lanoxin which was subsequently stopped  We discussed that tachycardia  Try empiric treatment and increase diltiazem watching for excessive bradycardia or hypotension  Also discussed repeat electrophysiologic study.  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 based on analysis of previous tracings although the rate of 110 bpm is certainly unusual    Comprehensive laboratory evaluation December 4, 2018 showed normal electrolytes, creatinine 0.74  Liver panel normal  Cholesterol 115 with LDL 63              Problem List:  Patient Active Problem List   Diagnosis     Hyperlipidemia LDL goal <70     SVT (supraventricular tachycardia) (H)     Chronic obstructive pulmonary disease, unspecified COPD type (H)     PVC (premature ventricular contraction)     CAD (coronary artery disease)     Dyspnea on exertion     GUERRERO (dyspnea on exertion)     Abnormal nuclear stress test     Essential hypertension     Medical History:  Past Medical History:   Diagnosis Date     Anxiety      Arrhythmia     pvc and vt     Basal cell carcinoma     chin     COPD (chronic obstructive pulmonary disease) (H)      Coronary artery disease      GERD (gastroesophageal reflux disease)      Herniated disc      Hyperlipidemia      Hypertension      Myocardial infarction (H) 1997     PVC (premature ventricular contraction)      Rhinitis      SVT (supraventricular tachycardia) (H)      Syncope      Surgical History:  Past Surgical History:   Procedure Laterality Date     BACK SURGERY       CARDIAC ELECTROPHYSIOLOGY MAPPING AND ABLATION       CORONARY ARTERY BYPASS GRAFT  1997    Comments: X 2 LIMA to LAD ESPERANZA to RCA     CV CORONARY ANGIOGRAM N/A 6/29/2018    Procedure: Coronary Angiogram;  Surgeon: Carito Flannery MD;  Location: [facility] Cath Lab;  Service:      CV CORONARY ANGIOGRAM N/A 7/5/2018    Procedure: Coronary Angiogram;  Surgeon: Carito Flannery MD;  Location: [facility] Cath Lab;  Service:      CV  LEFT HEART CATHETERIZATION WO LEFT VETRICULOGRAM Left 2018    Procedure: Left Heart Catheterization Without Left Ventriculogram;  Surgeon: Carito Flannery MD;  Location: [facility] Cath Lab;  Service:      HAND SURGERY       HERNIA REPAIR       MOHS SURGERY       MD ABLATE HEART DYSRHYTHM FOCUS      Description: Catheter Ablation Atrial Supraventricular Tachycardia;  Proc Date: 2010;  Comments: AVNRT  cryoablation     MD REPAIR EPIGASTRIC HERNIA,REDUC N/A 2014    Procedure: EPIGASTRIC INCISIONAL HERNIA REPAIR ;  Surgeon: Daniel Gutierrez MD;  Location: [facility] OR;  Service: General     TONSILLECTOMY       Social History:  Social History     Socioeconomic History     Marital status:      Spouse name: Not on file     Number of children: Not on file     Years of education: Not on file     Highest education level: Not on file   Occupational History     Not on file   Social Needs     Financial resource strain: Not on file     Food insecurity:     Worry: Not on file     Inability: Not on file     Transportation needs:     Medical: Not on file     Non-medical: Not on file   Tobacco Use     Smoking status: Former Smoker     Last attempt to quit: 1973     Years since quittin.3     Smokeless tobacco: Never Used   Substance and Sexual Activity     Alcohol use: Yes     Alcohol/week: 0.6 oz     Types: 1 Glasses of wine per week     Comment: daily with dinner     Drug use: No     Sexual activity: Not on file   Lifestyle     Physical activity:     Days per week: Not on file     Minutes per session: Not on file     Stress: Not on file   Relationships     Social connections:     Talks on phone: Not on file     Gets together: Not on file     Attends [employer] service: Not on file     Active member of club or organization: Not on file     Attends meetings of clubs or organizations: Not on file     Relationship status: Not on file     Intimate partner violence:     Fear of current or ex partner:  Not on file     Emotionally abused: Not on file     Physically abused: Not on file     Forced sexual activity: Not on file   Other Topics Concern     Not on file   Social History Narrative     Not on file       Review of Systems:      General: WNL  Eyes: WNL  Ears/Nose/Throat: WNL  Lungs: Shortness of Breath  Heart: Shortness of Breath with activity, Irregular Heartbeat, Leg Swelling(palpitations)  Stomach: WNL  Bladder: Frequent Urination at Night  Muscle/Joints: WNL  Skin: WNL  Nervous System: WNL  Mental Health: WNL     Blood: Easy Bruising        Family History:  Family History   Problem Relation Age of Onset     Hypertension Father          Allergies:  Allergies   Allergen Reactions     Amiodarone Other (See Comments)     Fatigue, shaking     Amiodarone Analogues Myalgia       Medications:  Current Outpatient Medications   Medication Sig Dispense Refill     aspirin 81 MG EC tablet Take 1 tablet (81 mg total) by mouth daily.       atorvastatin (LIPITOR) 80 MG tablet Take 80 mg by mouth daily.       betamethasone dipropionate (DIPROLENE) 0.05 % cream Apply 1 application topically 2 (two) times a day as needed.       clopidogrel (PLAVIX) 75 mg tablet Take 1 tablet (75 mg total) by mouth daily. 90 tablet 3     desonide (DESOWEN) 0.05 % cream Apply topically 2 (two) times a day as needed.       fluticasone (FLONASE) 50 mcg/actuation nasal spray Apply 2 sprays into each nostril every evening.        glucosamine sulfate (GLUCOSAMINE) 750 mg Tab Take 1,500 mg by mouth daily.        ipratropium (ATROVENT) 0.06 % nasal spray Apply 2 sprays into each nostril 2 (two) times a day.       melatonin 3 mg Tab Take 6 mg by mouth bedtime.        methylcellulose (CITRUCEL) Take 2 g by mouth daily. (1 tablespoons)       metoprolol tartrate (LOPRESSOR) 25 MG tablet Take 25 mg by mouth 2 (two) times a day with meals.       metoprolol tartrate (LOPRESSOR) 25 MG tablet TAKE ONE TABLET BY MOUTH TWICE DAILY 180 tablet 2     multivitamin  "(MULTIVITAMIN) per tablet Take 1 tablet by mouth daily.       nitroglycerin (NITROSTAT) 0.4 MG SL tablet Place 0.4 mg under the tongue every 5 (five) minutes as needed for chest pain.       omeprazole (PRILOSEC) 20 MG capsule Take 20 mg by mouth daily.       sodium chloride (OCEAN) 0.65 % nasal spray Apply 2 sprays into each nostril every morning.       vitamin A-vitamin C-vit E-min (OCUVITE) Tab tablet Take 1 tablet by mouth daily.       diltiazem (CARDIZEM CD) 180 MG 24 hr capsule Take 1 capsule (180 mg total) by mouth daily. 60 capsule 5     No current facility-administered medications for this visit.        Objective:   Vital signs:  /64 (Patient Site: Right Leg, Patient Position: Sitting, Cuff Size: Adult Regular)   Pulse (!) 58   Resp 16   Ht 5' 8\" (1.727 m)   Wt 167 lb (75.8 kg)   BMI 25.39 kg/m        Physical Exam:    GENERAL APPEARANCE: Alert, cooperative and in no acute distress.  HEENT: No scleral icterus. No Xanthelasma. Oral mucous membranes pink and moist.  NECK: JVP normal cm. No Hepatojugular reflux. Thyroid not  Palpable  CHEST: clear to auscultation and percussion  CARDIOVASCULAR: S1, S2 without murmur    Brachial, radial  pulses are intact and symmetric.   No carotid bruits noted.  ABDOMEN: Non tender. BS+. No bruits.  EXTREMITIES: No cyanosis, clubbing or edema.    Lab Results:  LIPIDS:  Lab Results   Component Value Date    CHOL 115 12/04/2018    CHOL 99 07/05/2018    CHOL 108 04/18/2018     Lab Results   Component Value Date    HDL 41 12/04/2018    HDL 33 (L) 07/05/2018    HDL 40 04/18/2018     Lab Results   Component Value Date    LDLCALC 63 12/04/2018    LDLCALC 55 07/05/2018    LDLCALC 59 04/18/2018     Lab Results   Component Value Date    TRIG 54 12/04/2018    TRIG 56 07/05/2018    TRIG 46 04/18/2018     No components found for: CHOLHDL    BMP:  Lab Results   Component Value Date    CREATININE 0.78 12/04/2018    BUN 13 12/04/2018     (L) 12/04/2018    K 4.5 12/04/2018    "  12/04/2018    CO2 27 12/04/2018         This note has been dictated using voice recognition software. Any grammatical or context distortions are unintentional and inherent to the software.  Efe Doan MD  [facility] HEART [facility]  [phone]

## 2021-05-27 NOTE — PATIENT INSTRUCTIONS - HE
Damian Butler    Thank you for coming to the [facility] today for a cardiac evaluation  It was my pleasure to see you today  A good contact for any questions would be Talisha Vance  RN @ [phone]    Recurrent episodes of tachycardia probably represent supraventricular tachycardia and a recurrence of AVNRT  Increase diltiazem from 120 mg daily to 180 mg daily-new prescription  I think he would be a good candidate for comprehensive electrophysiologic study with ablation  We will ask the EP nurses to call you about scheduling this procedure  We will see if Dr. Leyva is available  If your tachycardia persists, he should go to the emergency room for treatment  Otherwise continue with your termination methods that have been successful

## 2021-05-28 ENCOUNTER — RECORDS - HEALTHEAST (OUTPATIENT)
Dept: ADMINISTRATIVE | Facility: CLINIC | Age: 81
End: 2021-05-28

## 2021-05-28 NOTE — TELEPHONE ENCOUNTER
Pre-Intra-Post education and instructions as listed below were reviewed with Patient via phone.    5/2/2019 9:45 AM  Marycarmen Jo RN

## 2021-05-30 NOTE — PATIENT INSTRUCTIONS - HE
Damian Butler    It was a pleasure to see you at [facility] today.    Stop taking Plavix  Continue aspirin 1tablet/day as well as your current medications    Follow up appointment:   Dr. Crabtree in 1 year,  Dr. Leyva if needed    Contact Sharron at [phone] with questions or concerns.    Steve Leyva MD

## 2021-05-31 VITALS — WEIGHT: 170 LBS | HEIGHT: 68 IN | BODY MASS INDEX: 25.76 KG/M2

## 2021-06-01 VITALS — WEIGHT: 165.8 LBS | BODY MASS INDEX: 25.13 KG/M2 | HEIGHT: 68 IN

## 2021-06-01 VITALS — BODY MASS INDEX: 25.39 KG/M2 | WEIGHT: 167 LBS

## 2021-06-01 VITALS — BODY MASS INDEX: 25.42 KG/M2 | WEIGHT: 167.2 LBS

## 2021-06-01 VITALS — BODY MASS INDEX: 25.15 KG/M2 | WEIGHT: 165.4 LBS

## 2021-06-01 VITALS — WEIGHT: 161.5 LBS | HEIGHT: 68 IN | BODY MASS INDEX: 24.48 KG/M2

## 2021-06-01 VITALS — WEIGHT: 167 LBS | BODY MASS INDEX: 25.39 KG/M2

## 2021-06-01 VITALS — BODY MASS INDEX: 25.31 KG/M2 | WEIGHT: 167 LBS | HEIGHT: 68 IN

## 2021-06-01 VITALS — BODY MASS INDEX: 25.54 KG/M2 | WEIGHT: 168 LBS

## 2021-06-01 VITALS — HEIGHT: 68 IN | BODY MASS INDEX: 25.16 KG/M2 | WEIGHT: 166 LBS

## 2021-06-01 VITALS — BODY MASS INDEX: 25.24 KG/M2 | WEIGHT: 166 LBS

## 2021-06-01 VITALS — BODY MASS INDEX: 25.46 KG/M2 | HEIGHT: 68 IN | WEIGHT: 168 LBS

## 2021-06-01 VITALS — WEIGHT: 168 LBS | BODY MASS INDEX: 25.54 KG/M2

## 2021-06-01 VITALS — WEIGHT: 167.2 LBS | BODY MASS INDEX: 25.42 KG/M2

## 2021-06-01 VITALS — BODY MASS INDEX: 25.09 KG/M2 | WEIGHT: 165 LBS

## 2021-06-01 VITALS — WEIGHT: 170 LBS | HEIGHT: 68 IN | BODY MASS INDEX: 25.76 KG/M2

## 2021-06-02 VITALS — WEIGHT: 167 LBS | BODY MASS INDEX: 25.39 KG/M2

## 2021-06-02 VITALS — WEIGHT: 168 LBS | BODY MASS INDEX: 25.54 KG/M2

## 2021-06-02 VITALS — BODY MASS INDEX: 25.24 KG/M2 | WEIGHT: 166 LBS

## 2021-06-02 VITALS — WEIGHT: 168.9 LBS | BODY MASS INDEX: 25.6 KG/M2 | HEIGHT: 68 IN

## 2021-06-02 VITALS — BODY MASS INDEX: 25.31 KG/M2 | WEIGHT: 167 LBS | HEIGHT: 68 IN

## 2021-06-02 VITALS — WEIGHT: 165 LBS | BODY MASS INDEX: 25.09 KG/M2

## 2021-06-02 VITALS — BODY MASS INDEX: 25.39 KG/M2 | WEIGHT: 167 LBS

## 2021-06-03 VITALS — WEIGHT: 167.06 LBS | HEIGHT: 69 IN | BODY MASS INDEX: 24.74 KG/M2

## 2021-06-03 VITALS — WEIGHT: 165.7 LBS | BODY MASS INDEX: 24.54 KG/M2 | HEIGHT: 69 IN

## 2021-06-14 ENCOUNTER — RECORDS - HEALTHEAST (OUTPATIENT)
Dept: LAB | Facility: CLINIC | Age: 81
End: 2021-06-14

## 2021-06-14 LAB
ALBUMIN SERPL-MCNC: 3.9 G/DL (ref 3.5–5)
ALP SERPL-CCNC: 103 U/L (ref 45–120)
ALT SERPL W P-5'-P-CCNC: 19 U/L (ref 0–45)
ANION GAP SERPL CALCULATED.3IONS-SCNC: 10 MMOL/L (ref 5–18)
AST SERPL W P-5'-P-CCNC: 22 U/L (ref 0–40)
BILIRUB SERPL-MCNC: 0.9 MG/DL (ref 0–1)
BUN SERPL-MCNC: 18 MG/DL (ref 8–28)
CALCIUM SERPL-MCNC: 8.3 MG/DL (ref 8.5–10.5)
CHLORIDE BLD-SCNC: 102 MMOL/L (ref 98–107)
CHOLEST SERPL-MCNC: 114 MG/DL
CO2 SERPL-SCNC: 25 MMOL/L (ref 22–31)
CREAT SERPL-MCNC: 0.86 MG/DL (ref 0.7–1.3)
FASTING STATUS PATIENT QL REPORTED: YES
GFR SERPL CREATININE-BSD FRML MDRD: >60 ML/MIN/1.73M2
GLUCOSE BLD-MCNC: 83 MG/DL (ref 70–125)
HDLC SERPL-MCNC: 39 MG/DL
LDLC SERPL CALC-MCNC: 65 MG/DL
POTASSIUM BLD-SCNC: 5 MMOL/L (ref 3.5–5)
PROT SERPL-MCNC: 6.7 G/DL (ref 6–8)
SODIUM SERPL-SCNC: 137 MMOL/L (ref 136–145)
TRIGL SERPL-MCNC: 52 MG/DL

## 2021-06-16 PROBLEM — I10 ESSENTIAL HYPERTENSION: Status: ACTIVE | Noted: 2018-07-13

## 2021-06-16 PROBLEM — I25.10 CAD (CORONARY ARTERY DISEASE): Status: ACTIVE | Noted: 2018-06-26

## 2021-06-16 PROBLEM — R06.09 DOE (DYSPNEA ON EXERTION): Status: ACTIVE | Noted: 2018-06-29

## 2021-06-16 PROBLEM — R94.39 ABNORMAL NUCLEAR STRESS TEST: Status: ACTIVE | Noted: 2018-06-29

## 2021-06-16 NOTE — TELEPHONE ENCOUNTER
Telephone Encounter by Talisha Vance RN at 1/17/2019  1:12 PM     Author: Talisha Vance RN Service: -- Author Type: Registered Nurse    Filed: 1/17/2019  1:12 PM Encounter Date: 1/17/2019 Status: Signed    : Talisha Vance RN (Registered Nurse)       Noted.  Steve Solomon MD Lewandowski, Jessica L, RN   Cc: Efe Doan MD             Chart reviewed and I spoke with the patient today.  I agree with diagnosis of AVNRT documented on the CHERELLE earlier this year.  Patient is a candidate for repeat SVT ablation.   He just started diltiazem 120 mg/day and indicated that he will see Dr. Doan in 3 months and will consider ablation at that time if he is continuing to have symptoms.  He will call if he has troublesome palpitations.  Steve Leyva    Previous Messages      ----- Message -----   From: Talisha Vance RN   Sent: 1/17/2019   8:00 AM   To: MD Dr Autumn Arellano-   Dr Doan is needing and SVT ablation, likely AVNRT   Please review most recent note   Has hx of PVCs, most recent ACT 4/16/18 showed occasional PVCs   ACT 4/16/18- documented SVT-AVNRT in the 110's   Angiogram 7/5/18- PCI to RCA   EF on Stress Test 6/22/18 66%   Normal Renal function   Pt not on anticoagulation   Started on Diltiazem 120 two times a day     Ok to set up with you?   Thank you,   Nakia   ----- Message -----   From: Efe Doan MD   Sent: 1/16/2019   5:51 PM   To: [facility] Ep Rn Support [facility]     Set patient up for comprehensive electrophysiologic study and SVT ablation with Dr. Leyva or Dr. Gustafson

## 2021-06-16 NOTE — TELEPHONE ENCOUNTER
Telephone Encounter by Iris Lara, RN at 1/17/2019  2:33 PM     Author: Iris Lara, RN Service: -- Author Type: Registered Nurse    Filed: 1/17/2019  2:46 PM Encounter Date: 1/17/2019 Status: Signed    : Iris Lara, RN (Registered Nurse)       Efe Doan MD Decker, Susan M, RN             Is this the  correct patient   I did start a prescription on Damian Cordeliaby  Yesterday   It would be okay to substitute diltiazem  mg daily     Spoke with pt and the Pharmacy all the sudden has in the 120 mg and pt has picked those up. He will call in 3 weeks to updated staff as to how he is doing.   The 120 dose as removed and the 180 sent but pt states there are refills. So will see how he feels and dose at that point.

## 2021-06-18 NOTE — PROGRESS NOTES
[facility] Heart Care Note    Assessment:  PVCs associated with palpitations       Holter monitors in the past have shown up to 14,000 PVCs per day       Was intolerant to empiric treatment with sotalol, amiodarone  PSVT; slow pathway modification around 2010; AVNRT  Recurrence of SVT-unusual HR = 110 bpm.  This may represent unusual pattern of AVNRT considering the slow rate  Coronary artery disease with history of myocardial infarction 1997 with two-vessel bypass graft including bypass to LAD and right coronary artery  Sinus bradycardia  Preserved left ventricular function; echocardiogram March 26, 2017 showed normal ejection fraction  Excellent lipid control on high-dose atorvastatin      ECG shows normal sinus rhythm with WI prolongation equals 244 MS with sinus bradycardia equals 55 bpm, no acute changes    Plan:  Some of your palpitations are related to PVCs  Episodes of fast Heart rate/ tachycardia looks like a reentrant tachycardia and could be residual AVNRT/SVT.  But it is not very fast-about 110 bpm  Obtain a stress nuclear scan  Start Lanoxin 0.25 mg daily to see if this helps with the SVT-prescription called   Continue metoprolol-would not increase the dose because you have relatively slow heart rate  I would anticipate switching from Lanoxin to flecainide if the stress test is satisfactory  We briefly discussed repeat ablation, I think we should try medications first  Your cholesterol control is excellent  Your  physical exam is excellent    Subjective:    I had the opportunity to see.Damian Butler , who is a 77 y.o. male with a known history of   He was having recurrent episodes of SVT and was seen in the hospital emergency room on several occasions and was given adenosine.    I cannot find the EP report but he apparently had a comprehensive electrophysiologic study about 2010 and had AVNRT and underwent slow pathway modification with success  He had no problems with arrhythmias until about March  2018 when he began having episodes of tachycardia.  They would occur sometimes when he bent over and bent up, sometimes with exercise  Abrupt onset of tachycardia was associated with modest increase in heart rate but not terribly distressing not causing him to feel woozy lightheaded or syncopal  Often could terminate these episodes with Valsalva within a minute, none have been unrelenting  He underwent a multi-day monitor and on April 29 he did have an episode of SVT at about 110 bpm.  This appeared typical for AVNRT although the rate was quite slow  The monitor he also had a number of episodes of palpitations or flutter sensation that were correlated to PVCs  He has a history of quite frequent ventricular ectopy and in 2014 was assess for possible PVC ablation because she is having between 8000  tp 14,000 PVCs   per day  He did poorly on sotalol, also did poorly on amiodarone- to suppress PVCs    He had bypass surgery in 1997 has had no ischemic event since that time no angiograms or stents  Though he has some shortness of breath on stair climbing, no typical angina  No heart failure symptoms denies orthopnea PND pedal edema        Problem List:  Patient Active Problem List   Diagnosis     Hyperlipidemia     Coronary Artery Disease     Premature Ventricular Contractions     Supraventricular Tachycardia     Palpitations     Syncope     Cellulitis of left hand     Viral illness     Acute pain of right knee     Chronic obstructive pulmonary disease, unspecified COPD type     PVC (premature ventricular contraction)     Medical History:  Past Medical History:   Diagnosis Date     Anxiety      Basal cell carcinoma     chin     Coronary artery disease      GERD (gastroesophageal reflux disease)      Herniated disc      Hyperlipidemia      Hypertension      Myocardial infarction      Palpitations      Rhinitis      SVT (supraventricular tachycardia)      Surgical History:  Past Surgical History:   Procedure Laterality Date      BACK SURGERY       BYPASS GRAFT  1997    x 2     CARDIAC ELECTROPHYSIOLOGY MAPPING AND ABLATION       HAND SURGERY       HERNIA REPAIR       MOHS SURGERY       GA ABLATE HEART DYSRHYTHM FOCUS      Description: Catheter Ablation Atrial Supraventricular Tachycardia;  Proc Date: 04/20/2010;  Comments: AVNRT  cryoablation     GA CABG, VEIN, SINGLE      Description: CABG (CABG);  Proc Date: 01/01/1997;  Comments: X 2 LIMA to LAD ESPERANZA to RCA     GA REPAIR EPIGASTRIC HERNIA,REDUC N/A 7/2/2014    Procedure: EPIGASTRIC INCISIONAL HERNIA REPAIR ;  Surgeon: Daniel Gutierrez MD;  Location: [facility] OR;  Service: General     TONSILLECTOMY       Social History:  Social History     Social History     Marital status:      Spouse name: N/A     Number of children: N/A     Years of education: N/A     Occupational History     Not on file.     Social History Main Topics     Smoking status: Former Smoker     Smokeless tobacco: Never Used     Alcohol use 4.2 oz/week     7 Glasses of wine per week     Drug use: No     Sexual activity: Not on file     Other Topics Concern     Not on file     Social History Narrative       Review of Systems:      General: Weight Loss  Eyes: Visual Distubance  Ears/Nose/Throat: WNL  Lungs: WNL  Heart: Shortness of Breath with activity, Irregular Heartbeat, Leg Swelling  Stomach: WNL  Bladder: Frequent Urination at Night  Muscle/Joints: Muscle Pain  Skin: WNL  Nervous System: WNL  Mental Health: Anxiety     Blood: Easy Bruising        Family History:  Family History   Problem Relation Age of Onset     Hypertension Father          Allergies:  Allergies   Allergen Reactions     Amiodarone Other (See Comments)     Fatigue, shaking     Amiodarone Analogues Myalgia       Medications:  Current Outpatient Prescriptions   Medication Sig Dispense Refill     aspirin 325 MG EC tablet Take 325 mg by mouth daily.       atorvastatin (LIPITOR) 80 MG tablet Take 80 mg by mouth daily.       betamethasone  "dipropionate (DIPROLENE) 0.05 % cream Apply 1 application topically 2 (two) times a day as needed.       desonide (DESOWEN) 0.05 % cream Apply topically 2 (two) times a day as needed.       fluticasone (FLONASE) 50 mcg/actuation nasal spray Apply 2 sprays into each nostril every evening.        glucosamine sulfate (GLUCOSAMINE) 750 mg Tab Take 1,500 mg by mouth daily.        ipratropium (ATROVENT) 0.06 % nasal spray Apply 2 sprays into each nostril 2 (two) times a day.       methylcellulose (CITRUCEL) Take 2 g by mouth daily. (1 tablespoons)       metoprolol tartrate (LOPRESSOR) 25 MG tablet Take 25 mg by mouth 2 (two) times a day with meals.       metoprolol tartrate (LOPRESSOR) 25 MG tablet TAKE ONE TABLET BY MOUTH TWICE DAILY 180 tablet 0     multivitamin (MULTIVITAMIN) per tablet Take 1 tablet by mouth daily.       nitroglycerin (NITROSTAT) 0.4 MG SL tablet Place 0.4 mg under the tongue every 5 (five) minutes as needed for chest pain.       omeprazole (PRILOSEC) 20 MG capsule Take 20 mg by mouth daily.       sodium chloride (OCEAN) 0.65 % nasal spray Apply 2 sprays into each nostril every morning.       vitamin A-vitamin C-vit E-min (OCUVITE) Tab tablet Take 1 tablet by mouth daily.       digoxin (LANOXIN) 250 mcg tablet Take 1 tablet (250 mcg total) by mouth daily. 90 tablet 5     melatonin 3 mg Tab Take 6 mg by mouth bedtime.        No current facility-administered medications for this visit.        Objective:   Vital signs:  /64 (Patient Site: Left Arm, Patient Position: Sitting, Cuff Size: Adult Large)  Pulse (!) 56  Resp 16  Ht 5' 8\" (1.727 m)  Wt 168 lb (76.2 kg)  BMI 25.54 kg/m2      Physical Exam:      GENERAL APPEARANCE: Alert, cooperative and in no acute distress.  HEENT: No scleral icterus. No Xanthelasma. Oral mucous membranes pink and moist.  NECK: JVP normal cm. No Hepatojugular reflux. Thyroid not  Palpable  CHEST: clear to auscultation and percussion  CARDIOVASCULAR: S1, S2 without " murmur    Brachial, radial  pulses are intact and symmetric.   No carotid bruits noted.  ABDOMEN: Non tender. BS+. No bruits.  EXTREMITIES: No cyanosis, clubbing or edema.    Lab Results:  LIPIDS:  Lab Results   Component Value Date    CHOL 108 04/18/2018    CHOL 125 10/16/2017    CHOL 124 01/26/2017     Lab Results   Component Value Date    HDL 40 04/18/2018    HDL 43 10/16/2017    HDL 37 (L) 01/26/2017     Lab Results   Component Value Date    LDLCALC 59 04/18/2018    LDLCALC 73 10/16/2017    LDLCALC 75 01/26/2017     Lab Results   Component Value Date    TRIG 46 04/18/2018    TRIG 43 10/16/2017    TRIG 61 01/26/2017     No components found for: CHOLHDL    BMP:  Lab Results   Component Value Date    CREATININE 0.83 04/18/2018    BUN 21 04/18/2018     (L) 04/18/2018    K 5.2 (H) 04/18/2018     04/18/2018    CO2 26 04/18/2018         This note has been dictated using voice recognition software. Any grammatical or context distortions are unintentional and inherent to the software.  Efe Doan MD  [facility]  [phone]

## 2021-06-18 NOTE — PROGRESS NOTES
Conclusion     The pharmacologic nuclear stress test is abnormal.    There is a medium sized area of mixed ischemia and nontransmural infarction in the inferior and inferolateral segment(s) of the left ventricle.    The left ventricular ejection fraction is 66%.    The patient is at an intermediate risk of future cardiac ischemic events.    There is no prior study available.     Prior echocardiogram showed preserved ejection fraction  Has shortness of breath and less exercise capacity but no angina  No clear-cut ST segment changes with exercise, no worrisome ventricular ectopy  Now on Lanoxin seems to help his SVT    I did a comprehensive telephone call conversation with him  I recommended coronary angiography with possible PCI  We discussed alternative management such as observation medical treatment as well  He would like to proceed to coronary angiography with possible intervention

## 2021-06-19 NOTE — PROGRESS NOTES
Damian Butler has participated in 8 sessions of Phase II Cardiac Rehab.    Progress Report:   Cardiac Rehab Treatment Progress Report 8/1/2018 8/6/2018 8/8/2018 8/13/2018 8/15/2018   Weight 165 lbs 6 oz 166 lbs 167 lbs 168 lbs 167 lbs   Pre Exercise  HR 57 57 60 56 54   Pre Exercise /60 118/60 106/50 118/62 108/70   Treadmill Peak HR 77 79 80-82 80 70   Treadmill Peak Blood Pressure - - - - -   Nustep Peak Heart Rate 79 69 75 74 71   Nustep Peak Blood Pressure 130/68 130/60 140/68 148/60 130/68   Heart Rate 65 62 66 60 60   Post Exercise /52 118/60 130/72 122/70 122/60   ECG SB/SR with 1'AVB with occ PVC's. One short bout of trigeminy. SB/SR with 1' AVB, occ PVCs. SB/SR with 1' AVB, occ-freq PVC's with short bouts of quadrigeminy.  More freq. PVC's while on TM, pt denies symptoms SB/SR with 1' AVB, occ PVC's with short bouts of quadrigeminy  SR with 1'AVB with rare PVCs   Total Exercise Minutes 45 45 47 45 45         Current Status:  Currently exercising without complaints or symptoms.    If Physician recommends change in treatment plan, please place orders.        __________________________________________________      _____________  Signature                                                                                                  Date/Time

## 2021-06-19 NOTE — PROGRESS NOTES
Cardiac Rehab  Phase II Assessment    Assessment Date: 7/23/18    Diagnosis: CAD  Date of Onset: 1997  Procedure: PCI with LUCILLE x 3  Date of Onset: 7/5/18  ICD/Pacemaker: No Parameters: NA  Post-op Complications: none  ECG History: SR, SVT, PVCs EF%:66, per stress test 6/22/18  Past Medical History:  has a past medical history of Anxiety; Arrhythmia; Basal cell carcinoma; COPD (chronic obstructive pulmonary disease) (H); Coronary artery disease; GERD (gastroesophageal reflux disease); Herniated disc; Hyperlipidemia; Hypertension; Myocardial infarction (1997); PVC (premature ventricular contraction); Rhinitis; SVT (supraventricular tachycardia) (H); and Syncope.; smoked for 18 years, quit in 1973; s/p back surgery in 1980, diskectomy; occ R knee pain    Past Surgical History:   Procedure Laterality Date     BACK SURGERY       CARDIAC ELECTROPHYSIOLOGY MAPPING AND ABLATION       CORONARY ARTERY BYPASS GRAFT  1997    Comments: X 2 LIMA to LAD ESPERANZA to RCA     CV CORONARY ANGIOGRAM N/A 6/29/2018    Procedure: Coronary Angiogram;  Surgeon: Carito Flannery MD;  Location: [facility] Cath Lab;  Service:      CV CORONARY ANGIOGRAM N/A 7/5/2018    Procedure: Coronary Angiogram;  Surgeon: Carito Flannery MD;  Location: [facility] Cath Lab;  Service:      CV LEFT HEART CATHETERIZATION WO LEFT VETRICULOGRAM Left 6/29/2018    Procedure: Left Heart Catheterization Without Left Ventriculogram;  Surgeon: Carito Flannery MD;  Location: [facility] Cath Lab;  Service:      HAND SURGERY       HERNIA REPAIR       MOHS SURGERY       WA ABLATE HEART DYSRHYTHM FOCUS      Description: Catheter Ablation Atrial Supraventricular Tachycardia;  Proc Date: 04/20/2010;  Comments: AVNRT  cryoablation     WA REPAIR EPIGASTRIC HERNIA,REDUC N/A 7/2/2014    Procedure: EPIGASTRIC INCISIONAL HERNIA REPAIR ;  Surgeon: Daniel Gutierrez MD;  Location: [facility];  Service: General     TONSILLECTOMY         Physical Assessment  Precautions/ Physical  Limitations: Standard cardiac sx  Oxygen: No  O2 Sats: 98 Lung Sounds: clear Edema: B ankle edema +2  Incisions: ok, per pt  Sleeping Pattern: fair   Appetite: good   Nutrition Risk Screen: Completed.    Pain  Location: denies  Characteristics:NA  Intensity: (0-10 scale) 0  Current Pain Management: NA  Intervention: NA  Response: NA    Psychosocial/ Emotional Health  1. In the past 12 months, have you been in a relationship where you have been abused physically, emotionally, sexually or financially? No  notified: NA  2. Who do you turn to for emotional support?: spouse  3. Do you have cultural or spiritual needs? No  4. Have there been any major life changes in the past 12 months? No    Referral Information  Primary Physician: Nikolai Monroy MD  Cardiologist: Dr. Tonio Crabtree  Surgeon: Dr. Carito Flannery    Home exercise/Equipment: treadmill, stationary bike    Patient's long-term goal(s): Increase home ex duration    1. Living Accommodations: [facility] Steps: No      Support people at home: wife   2. Marital Status:   3. Family is able to assist with cares      Mandaen/Community involvement: volunteers at [employer]  4. Recreation/Hobbies: Walking, biking

## 2021-06-19 NOTE — PROGRESS NOTES
ITP ASSESSMENT   Assessment Day: Initial  Session Number: 1  Precautions: Standard cardiac sx  Diagnosis: Stent  Risk Stratification: High (Recent SVT puts pt into high risk)  Referring Provider: Carito Flannery MD   ITP sent to Dr. Skinner  EXERCISE  Exercise Assessment: Initial     6 Minute Walk Test   Pre   Pre Exercise HR: 54                  Pre Exercise BP: 130/60    Peak  Peak HR: 69                 Peak BP: 150/68  Peak feet: 1100  Peak O2 SAT: 98  Peak RPE: 12-13  Peak MPH: 2.08    Symptoms:  Peak Symptoms: B upper leg stiffness, SOB    5 mins. Post  5 Min Post HR: 58  5 Min Post BP: 110/62                         Exercise Plan  Goals Next 30 days  ADL'S: Independent with all ADLs.  Leisure: Increase walking/biking to 3-4x/week for 30-45 mins.  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      Education Goals: Patient can state cardiac s/s and appropriate emergency response.  Education Goals Met: Has system for taking medication.;Medication review.    Exercise Prescription  Exercise Mode: Treadmill;Bike;Nustep;Arm Erg.;Stairs  Frequency: 2x/week  Duration: 30-45 mins  Intensity / THR: 20-30 beats above resting heart rate  RPE 11-14  Progression / Met level: 2.7-3.5  Resistive Training?: Yes    Current Exercise (mins/week): 45    Interventions  Home Exercise:  Mode: Walking, biking  Frequency: 3-4x/weel  Duration: 30-45 mins    Education Material : Educational videos;Provide written material;Individual education and counseling;Offer educational classes    Education Completed  Exercise Education Completed: Signs and Symptoms;Medication review;RPE;Emergency Plan;Home Exercise;Warm up/cool down;FITT Principles;BP/HR Reponse to exercise;Benefits of Exercise;End point of exercise            Exercise Follow-up/Discharge  Follow up/Discharge: Pt is walking 10-20 mins at home, 3-4x/week. NUTRITION  Nutrition Assessment: Initial    Nutrition Risk Factors:  Nutrition Risk Factors:  "Dyslipidemia;Overweight  Cholesterol: 108 (4/18/18)  LDL: 59  HDL: 40  Triglycerides: 46    Nutrition Plan  Interventions  Other Nutrition Intervention: Diet Class;Therapist/Pt Discussion;Educational Videos;Provide with Written Material    Education Completed  Nutrition Education Completed: Risk factor overview    Goals  Nutrition Goals (Next 30 days): Patient can identify their risk factors for CAD;Patient will maintain current weight or gradual weight gain;Patient will follow a low sodium diet;Patient will follow a low saturated fat diet;Patient knows appropriate portion size    Goals Met  Nutrition Goals Met: Completed Nutritional Risk Screen;Provided Rate your Plate Survey;Reviewed Dietitian schedule    Height, Weight, and  BMI  Weight: 167 lb 3.2 oz (75.8 kg)  Height: 5' 8\" (1.727 m)  BMI: 25.43    Nutrition Follow-up  Follow-up/Discharge: Diet survey given to pt.  He states his wife generally cooks, and they do pay attention to salt intake and fat content.       Other Risk Factors  Other Risk Factor Assessment: Initial    HTN Risk Factor: Hypertension    Pre Exercise BP: 130/60  Post Exercise BP: 110/62    Hypertension Plan  Goals  HTN Goals: Follow low sodium diet    Goals Met  HTN Goals Met: Exercises regularly;Take medication as prescribed    HTN Interventions  HTN Interventions: Diet consult;Therapist/patient discussion;Provide written material;Offer educational videos;Offer educational classes    HTN Education Completed  HTN Education Completed: Medication review;Risk factor overview    Tobacco Risk Factor: NA        Risk Factor Follow-up   Follow-up/Discharge: Will encourage pt to meet with dietician.   PSYCHOSOCIAL  Psychosocial Assessment: Initial     Pt had to leave [facility] early, so he did not have time to complete psychosocial surveys.  They will be completed at a later date.    Psychosocial Risk Factor: NA    Psychosocial Plan  Interventions  Interventions: Offer educational videos and classes;Provide " written material;Individual education and counseling      Goals  Goals (Next 30 days): Oriented to stress management classes;Identify stressors;Improvement in [employer] COOP score;Practicing stress management skills    Goals Met  Goals Met: Identified Support system    Psychosocial Follow-up  Follow-up/Discharge: Pt denies regular stress.           Patient involved in Goal setting?: Yes    Signature: _____________________________________________________________    Date: __________________    Time: __________________

## 2021-06-19 NOTE — PROGRESS NOTES
ITP ASSESSMENT   Assessment Day: 30 Day  Session Number: 9  Precautions: Standard cardiac sx  Diagnosis: Stent  Risk Stratification: High  Referring Provider: Nikolai Monroy MD   ITP sent to Dr. 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                        Goals Met  Initial ADL's goals met: Independent  Initial Leisure goals met: Patient has resumed walking 20-30 minutes 3x/week. Has not resumed more than 30 minutes.   Intial Work goals met: Goal not met. Patient has resumed some duties as a [employer] volunteer but not all outdoor duties.  Initial Progression: Patient has reached 3.2-4 METs in cardiac rehab.     Exercise Prescription  Exercise Mode: Treadmill;Bike;Nustep;Arm Erg.;Stairs  Frequency: 2x/week  Duration: 30-45 minutes  Intensity / THR: 20-30 beats above resting heart rate  RPE 11-14  Progression / Met level: 4.5-5  Resistive Training?: No (Patient has bilateral shoulder pain)    Current Exercise (mins/week): 140    Interventions  Home Exercise:  Mode: walking  Frequency: 3-4x/week  Duration: 30-45 minutes    Education Material : Educational videos;Provide written material;Individual education and counseling;Offer educational classes    Education Completed  Exercise Education Completed: Signs and Symptoms;Medication review;RPE;Emergency Plan;Home Exercise;Warm up/cool down;FITT Principles;BP/HR Reponse to exercise;Benefits of Exercise;End point of exercise            Exercise Follow-up/Discharge  Follow up/Discharge: Pt is walking 30 minutes at home. 3x/week. Plan to increase 30-45 minutes.          "NUTRITION  Nutrition Assessment: Reassessment    Nutrition Risk Factors:  Nutrition Risk Factors: Dyslipidemia;Overweight  Cholesterol: 99 (7/5/18)  LDL: 55  HDL: 33  Triglycerides: 56    Nutrition Plan  Interventions  Diet Consult: Declined  Other Nutrition Intervention: Diet Class;Therapist/Pt Discussion;Provide with Written Material  Initial Rate Your Plate Score: 64    Education Completed  Nutrition Education Completed: Risk factor overview;Low sodium diet;Low Saturated fat diet    Goals  Nutrition Goals (Next 30 days): Patient will follow a low sodium diet;Patient will follow a low saturated fat diet;Patient can identify their risk factors for CAD;Patient will lose weight    Goals Met  Nutrition Goals Met: Completed Nutritional Risk Screen;Provided Rate your Plate Survey;Reviewed Dietitian schedule    Height, Weight, and  BMI  Weight: 167 lb (75.8 kg)  Height: 5' 8\" (1.727 m)  BMI: 25.4    Nutrition Follow-up  Follow-up/Discharge: Patient declines to meet with a dietician. Patient states that he does not add salt to his food. He does try to eat more turkey, chicken and fish.      Other Risk Factors  Other Risk Factor Assessment: Reassessment    HTN Risk Factor: Hypertension    Pre Exercise BP: 122/62  Post Exercise BP: 118/64    Hypertension Plan  Goals  HTN Goals: Follow low sodium diet    Goals Met  HTN Goals Met: Exercises regularly;Take medication as prescribed    HTN Interventions  HTN Interventions: Diet consult;Therapist/patient discussion;Offer educational classes    HTN Education Completed  HTN Education Completed: Low sodium diet;Risk factor overview    Tobacco Risk Factor: NA    Risk Factor Follow-up   Follow-up/Discharge: Pt declined to meet with dietician.        PSYCHOSOCIAL  Psychosocial Assessment: Reassessment     [facility] ARETHA Q of L Summary Score: 21    NELSON-D Score: 6    Psychosocial Risk Factor: NA    Psychosocial Plan  Interventions  Interventions: Offer educational videos and " classes;Provide written material;Individual education and counseling    Education Completed  Education Completed: Relaxation/Coping Techniques    Goals  Goals (Next 30 days): Improvement in [employer] COOP score    Goals Met  Goals Met: Identified Support system;Practicing stress management skills    Psychosocial Follow-up  Follow-up/Discharge: Pt denies regular stress. He does state that he enjoys reading when stressed         Patient involved in Goal setting?: Yes    Signature: _____________________________________________________________    Date: __________________    Time: __________________

## 2021-06-19 NOTE — LETTER
Letter by Sonya Oreilly RN at      Author: Sonya Oreilly RN Service: -- Author Type: --    Filed:  Encounter Date: 4/26/2019 Status: (Other)        Dear Damian Butler,    Important Information for Your Procedure  Your Procedure:    SVT (Supra Ventricular Tachycardia) Ablation    Your procedure is scheduled for Tuesday, 5-7-19;  Arrival time @ 0800 with Dr. Steve Leyva    Getting Ready for Your Procedure:    You will need to bring a current list of your medications with you for our admissions process the day of your procedure, please do not bring any of your own medications  with you to the hospital    The hospital cannot store your valuables, so please leave them at home    Please make arrange for transportation home, as you will not be able to drive following your procedure  The Night Prior to Your Procedure:    Please do not have anything to eat or drink after Midnight (12:00am) prior to your procedure    It is important to stay well hydrated, and consume balanced meals the day prior to your procedure  Arriving for Your Procedure:  Please arrive at [facility], located at: [address] [place] [address]    parking is available after 5:00am for your convenience, parking is also available in the parking ramp located off of [address] attached to the hospital  If you park in the ramp located on [address] [place] [address], take the elevator to level one. Enter the doors to the atrium/lobby area and check in at the main reception desk.   Please check in at the main reception desk in the lobby  You will be assisted to Card[facility] on the third floor.  Going Home:    You will likely go home after the procedure, but if Dr. Leyva feels that more monitoring is necessary you could stay overnight in the hospital    The physician and/or nurse will review any restrictions, follow-up requirements, and medication instructions prior to going home from the hospital  Clinic Contact Information:    You  can contact our main clinic line at any time with questions, concerns, or if you need further information: [facility] [phone]  If you have further questions in regards to the scheduling of you procedure, please contact our scheduling team at [phone]    Medication prior to procedure:    Please stop taking Metoprolol and Diltiazem one day prior to your procedure.  Your last dose of these medications will be Sunday 5-5-19 and Please take your morning medications the day of your procedure with sips of water, except any multivitamins or supplements.    If you have any questions regarding your medication prior to your procedure please contact me at the number listed below.      Sincerely,    Sonya Oreilly RN  [phone]                                                                                                                  Information on Supraventricular Tachycardia Ablations    Your Hearts Electrical System   Electrical signals activate the heart muscle which pumps the heart. The heart has a special system that creates and sends electrical signals. The 2 upper chambers (atria) are the first to be activated and squeeze the blood to the 2 lower chambers (ventricles). The electrical signal passes through the connector in the middle of the heart, activating the 2 lower chambers (ventricles), which then pump blood to the lungs and body.            Understanding Supraventricular Tachycardia  Supraventricular tachycardia (SVT) is an abnormally rapid heart rhythm from the upper chambers of the heart, the atria.  This rhythm can be quite fast but is not life-threatening.  It may cause feelings of rapid pounding in the chest, lightheadedness, chest discomfort, shortness of breath and rarely fainting.  Episodes vary in frequency and duration and sometimes require a visit to the Emergency Department for medication to stop the rapid beating.    Treatment of Supraventricular  Tachycardia  The main goal of treatment of SVT is relief of symptoms by preventing or minimizing episodes of rapid heat beating.  Medications such as beta blockers may be effective but often need to be taken on a regular basis indefinitely and may be associated with side effects such as fatigue.  A catheter ablation is a procedure that permanently affects the extra pathways responsible for the abnormal circuits causing rapid heart beating.  The ablation procedure typically lasts about 3 hours done on an outpatient basis with over 98% likelihood of never having the rapid heart beating again or having to take medicines for rapid heart beating.      Supraventricular Tachycardia that may be treated with catheter ablation:               What is Catheter Ablation?             A Catheter Ablation is a procedure that treats certain types of abnormal heart rhythms (arrhythmia). There are several components to the procedure, but the final purpose is target and destroy (ablate) small areas of your heart muscle that are causing the arrhythmia.     Why is an Ablations Important?  A catheter ablation is an effective way to treat some types of abnormal heart rhythms. An ablation is a relatively low risk procedure that may permanently cure your abnormal heart rhythm. An ablation can help avoid lifetime of medications and give patients the ability to return back to their normal activity and live an active life.    Why is Catheter Ablation Done?  Sometimes, the hearts electrical system does not work properly.  This can cause abnormal heart rhythms, called arrhythmias.  During an arrhythmia, the heart may beat too fast, too slowly, or irregularly.  Your doctor has recommended catheter ablation to treat a rapid (fast) heart rhythm, or tachycardia.      How Catheter Ablation Is Done    Catheter ablation uses thin, flexible wires called electrode catheters to find and destroy (ablate) problem cells. Heres how the procedure is done:         The catheters allow an ECG to be obtained from the inside of the heart so the type of abnormal pathways can be identified. The focus of your ablation is the left and/or right atrium of your heart. An ablation catheter is then moved into this area of the abnormal pathways to freeze or heat (ablate) the muscle fibers causing the abnormal heart rhythm.   If AV moy reentry is found (Figure 1) a freezing catheter is used at the X. Freezing can be stopped if the catheter is too close to the normal connection (the AV node) without permanent damage.  If AV reentry is found (Figure 2) a heating catheter crosses the septum (S) to burn the accessory pathway (AP).  Blood thinners are used to prevent blood clots and stroke with the catheter on this side of the heart.  About 20 minutes after the ablation, attempts are made to restart the rapid heart beating with pacing.  More ablation is done if SVT can still be started.  If SVT cannot be started the procedure is finished and there is less than 2% chance it will ever recur.       Your Experience during Catheter Ablation  In most cases, catheter ablations are done in an electrophysiology (EP) lab. Depending on your arrhythmia it often takes 2-6 hours.     The procedural area: You will be transferred back to the procedure room once you have been appropriately prepped by the nursing staff and you are ready for your ablation.     Sedation: Youll receive medication to prevent pain and also a medication to help you relax or sedate you during the procedure.     Inserting the catheters: You will typically have one located near your shoulder vein, in addition 2-3 in your left and right groin. Catheters are inserted through these punctures and guided to the heart with the help of x-ray monitors.    Finishing up: When the procedure is finished, the catheters are taken out of your body. Pressure is applied to the puncture sites to help them close. Youre then taken to a recovery room to  rest.      Risks and Complications  The risks of catheter ablation are fairly low compared to the benefits you receive. Possible risks and complications include:    Bleeding or bruising    Blood clots    A slow heart rhythm (requiring a permanent pacemaker)    Perforation of the heart muscle, blood vessel, or lung (may require an emergency procedure)    Damage to a heart valve (rare)    Stroke or heart attack, also known as acute myocardial infarction, or AMI (rare)  Death (extremely rare)  Before your Catheter Ablation  Before your catheter ablation, you will meet with the electrophysiologist (specially trained heart doctor) who will do the procedure. The provider or a registered nurse will provide you with detailed instructions on how to prepare for this procedure, some of these instructions are listed below.    You will likely be told to stop or change your heart rhythm medications for a period of time before your ablation.     You may have testing done several days prior to your ablation or the morning of your ablation, such as an ECG, x-ray, blood tests, or echocardiogram.     You will not be allowed to eat or drink 8 hours before your ablation. You will be given further instructions by your physician or a registered nurse regarding the medication you will take the morning of your procedure.    You will need to arrange to have a  home from the hospital; you will not be permitted to drive after your procedure due to the sedation that you receive.     You are allowed to bring personal items and clothing to the hospital, but please refrain from bringing any valuables as the hospital is not responsible for any lost or stolen items.    You will need to bring a list of the names and dosages of the medication you are taking to the hospital.    It is important to mention to your doctor or registered nurse if you have any allergies, reactions to anesthesia, or have had history of bleeding problems.  Arriving at  the Hospital the morning of your Catheter Ablation  You will need to check in at the 1st floor entrance at the [facility] the morning of your ablation, you will then be escorted to the 3rd floor where they will prepare you for your ablation and where your ablation will be performed.    When you arrive on the floor the doctor or registered nurse will meet with you prior to your ablation, this is a good time to ask questions and address any concerns you may have. You will then be asked to sign the consent form for your ablation, if this has not already been done.    The nursing staff will begin to prepare you for your procedure:    A nurse will shave and cleanse the area where the ablation catheters will be placed. These areas are most commonly the left and right groin sites (the fold between your thigh and abdomen), and in some cases the chest, arm, and neck. This is done to reduce the risk of infection.      The nursing staff will start an intravenous (IV) line into a vein in your arm, which allows the staff to give you medication and sedatives to help you relax prior and during your ablation.    In some cases the nursing staff will need to place a catheter that will drain urine from your bladder (Marin Catheter), which is required due to the length and complexity of the ablation you are having.    After Catheter Ablation  Recovery immediately after your ablation in the hospital  After your catheter ablation procedure, youll be taken to a recovery room. You may need to lie flat for 2-6 hours while the insertion sites close up. During this time youll be monitored by a nurse, and you will be given medication to make you comfortable. You may go home later that day, or you may stay in the hospital overnight.  Going Home  When its time to go home, your will need to have an adult family member or friend drive you. Most people can walk, climb stairs, and perform light activity soon after catheter  ablation. You can most likely return to your full routine within a few days. But you may be told to avoid running, heavy lifting, and other strenuous activities for a short time. Please make sure to follow any specific activity restrictions provided by the medical staff at the time of your discharge from the hospital.    Doctor's typically advise that you not drive for 24 hours after the procedure.     Avoid heavy physical activity and heavy lifting for several days after the procedure to allow your body to heal.    Ask your doctor when you can expect to return to work.    Take your temperature and check your incision for signs of infection (redness, swelling, drainage, or warmth) every day for a week. It is normal to have a small bruise or lump where the catheter was inserted.    Take your medications exactly as directed. Dont skip doses or stop medication without consulting your physician prior.    Learn to take your own pulse and keep a record of your results.    Follow-Up  After most ablations you will have a follow up visit to see how you are doing, to assess your rhythm after your ablation, and to address any medication changes if necessary. In many cases, one ablation is enough to treat an arrhythmia. But sometimes the problem returns or another is found. If this happens, you may need a second catheter ablation. Tell your healthcare provider if you have any new or returning symptoms.  Common Symptoms after Catheter Ablation  In the first few weeks after catheter ablation, you may feel mild chest fullness or aching. You may also feel as if your heart is skipping beats or your heartbeat may feel faster than normal. You may think that your heart rhythm problem is about to return. These sensations are normal and usually go away with time. Talk to your healthcare provider if youre concerned.    When to Call Your Doctor    Increased bleeding, bruising, or pain at the insertion site    Shortness of breath or chest  pain    Difficulty with your speech or walking, or any visual disturbance    Lightheaded, dizziness, or feeling faint    Coldness, swelling, or numbness of the arm or leg near the insertion site    A bruise or lump at the insertion site that is larger than a walnut    A fever over 100 F    Symptoms of your arrhythmia

## 2021-06-20 NOTE — PROGRESS NOTES
ITP ASSESSMENT   Assessment Day: 90 Day Last Day  Session Number: 21  Precautions: standard cardiac sx  Diagnosis: Stent  Risk Stratification: High  Referring Provider: Nikolai Monroy MD   ITP sent to Dr. Skinner  EXERCISE  Exercise Assessment: Discharge     6 Minute Walk Test   Pre   Pre Exercise HR: 55                  Pre Exercise BP: 104/60    Peak  Peak HR: 77                 Peak BP: 140/68  Peak feet: 1200  Peak O2 SAT: 96  Peak RPE: 12  Peak MPH: 2.27    Symptoms:  Peak Symptoms: Miah. hip stiffness    5 mins. Post  5 Min Post HR: 58  5 Min Post BP: 110/68                         Exercise Plan  Goals Next 30 days  ADL'S: Independent  Leisure: Begin doing stretching exercises at home, Increase exercise MET level  Work: Tolerate doing more of the outdoor activities at [employer]    Education Goals: All goals in this section met  Education Goals Met: Patient can state cardiac s/s and appropriate emergency response.;Has system for taking medication.;Medication review.                        Goals Met  60 day ADL'S goals met: Independent  60 day Leisure goals met: Resumed doing some stretching exericses at home, increased MET level to 3.5-5.2 METs  60 day Work goals met: Tolerates doing some of the outdoor activities at [employer]  60 Day Progression: Patient has reached 3.5-5.2 MET level    30 day ADL'S goals met: Independent  30 day Leisure goals met: Pt is walking 3-4x/week for 30-40 min  30 day Work goals met: Pt is tolerating most outdoor activities at [employer], but has not tried all of the activities he used to do  30 Day Progression: Pt has reached 3.7-4 MET level, walking regularly at home    Initial ADL's goals met: Independent  Initial Leisure goals met: Patient has resumed walking 20-30 minutes 3x/week. Has not resumed more than 30 minutes.   Intial Work goals met: Goal not met. 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  Initial Progression: Patient has reached 3.2-4  "METs in cardiac rehab.     Exercise Prescription  Exercise Mode: Treadmill;Bike;Nustep;Arm Erg.;Stairs  Frequency: 2x/week  Duration: 40-45 min  Intensity / THR: 20-30 beats above resting heart rate  RPE 11-14  Progression / Met level: 4-5  Resistive Training?: No (due to shoulder issues)    Current Exercise (mins/week): 150    Interventions  Home Exercise:  Mode: walking, biking, TM  Frequency: 4-6x/week  Duration: 30-45 min.    Education Material : Educational videos;Provide written material;Individual education and counseling;Offer educational classes    Education Completed  Exercise Education Completed: Signs and Symptoms;Medication review;RPE;Emergency Plan;Home Exercise;Warm up/cool down;FITT Principles;BP/HR Reponse to exercise;Benefits of Exercise;End point of exercise;Cardiac Anatomy            Exercise Follow-up/Discharge  Follow up/Discharge: Patient plans to walk, Treadmill or stationary bike for home exercise. NUTRITION  Nutrition Assessment: Discharge    Nutrition Risk Factors:  Nutrition Risk Factors: Dyslipidemia;Overweight    Nutrition Plan  Interventions  Diet Consult: Declined  Other Nutrition Intervention: Therapist/Pt Discussion  Initial Rate Your Plate Score: 64    Education Completed  Nutrition Education Completed: Low Saturated fat diet;Risk factor overview;Low sodium diet    Goals  Nutrition Goals (Next 30 days): Patient demonstrated understanding of cardiac nutrition, no goals identified for the next 30 days    Goals Met  Nutrition Goals Met: Patient can identify their risk factors for CAD;Patient follows a low sodium diet;Patient states following a low saturated fat diet;Patient has lost weight;Patient knows appropriate portion size    Height, Weight, and  BMI  Weight: 165 lb (74.8 kg)  Height: 5' 8\" (1.727 m)  BMI: 25.09    Nutrition Follow-up  Follow-up/Discharge: Patient declines to meet with a dietician. Patient states that he does not add salt to his food. He does try to eat more " turkey, chicken and fish.        Other Risk Factors  Other Risk Factor Assessment: Discharge    HTN Risk Factor: Hypertension    Pre Exercise BP: 104/60  Post Exercise BP: 110/66    Hypertension Plan  Goals  HTN Goals: Patient demonstrates understanding of HTN, no goals identified for the next 30 days    Goals Met  HTN Goals Met: Follow low sodium diet;Take medication as prescribed;Exercises regularly    HTN Interventions  HTN Interventions: Diet consult;Therapist/patient discussion;Offer educational classes    HTN Education Completed  HTN Education Completed: Low sodium diet;Risk factor overview    Tobacco Risk Factor: NA    Risk Factor Follow-up   Follow-up/Discharge: Pt denies history of hypertension.   PSYCHOSOCIAL  Psychosocial Assessment: Discharge     [facility] Q of L Summary Score: 18    NELSON-D Score: 1    Psychosocial Risk Factor: NA    Psychosocial Plan  Interventions  Interventions: Offer educational videos and classes;Provide written material;Individual education and counseling    Education Completed  Education Completed: Relaxation/Coping Techniques    Goals  Goals (Next 30 days): Improvement in [employer] score    Goals Met  Goals Met: Improvement in [employer] score    Psychosocial Follow-up  Follow-up/Discharge: Patient denies regular stress, does enjoy readig and walking outside when weather permitted.         Patient involved in Goal setting?: Yes    Signature: _____________________________________________________________    Date: __________________    Time: __________________

## 2021-06-20 NOTE — PROGRESS NOTES
ITP ASSESSMENT   Assessment Day: 60 Day  Session Number: 15  Precautions: Standard cardiac sx  Diagnosis: Stent  Risk Stratification: High  Referring Provider: Nikolai Monroy MD   ITP: Dr. Skinner  EXERCISE  Exercise Assessment: Reassessment                              Exercise Plan  Goals Next 30 days  ADL'S: Independent  Leisure: Begin doing stretching exercises at home, Increase exercise MET level  Work: Tolerate doing more of the outdoor activities at [employer]    Education Goals: All goals in this section met  Education Goals Met: Patient can state cardiac s/s and appropriate emergency response.;Has system for taking medication.;Medication review.                        Goals Met  30 day ADL'S goals met: Independent  30 day Leisure goals met: Pt is walking 3-4x/week for 30-40 min  30 day Work goals met: Pt is tolerating most outdoor activities at [employer], but has not tried all of the activities he used to do  30 Day Progression: Pt has reached 3.7-4 MET level, walking regularly at home    Initial ADL's goals met: Independent  Initial Leisure goals met: Patient has resumed walking 20-30 minutes 3x/week. Has not resumed more than 30 minutes.   Intial Work goals met: Goal not met. 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  Initial Progression: Patient has reached 3.2-4 METs in cardiac rehab.     Exercise Prescription  Exercise Mode: Treadmill;Bike;Nustep;Arm Erg.;Stairs  Frequency: 2x/week  Duration: 40-45 min  Intensity / THR: 20-30 beats above resting heart rate  RPE 11-14  Progression / Met level: 4-5  Resistive Training?: No (due to shoulder issues)    Current Exercise (mins/week): 150    Interventions  Home Exercise:  Mode: walking  Frequency: 3-4x/week  Duration: 30-45 min    Education Material : Educational videos;Provide written material;Individual education and counseling;Offer educational classes    Education Completed  Exercise Education Completed: Signs and  "Symptoms;Medication review;RPE;Emergency Plan;Home Exercise;Warm up/cool down;FITT Principles;BP/HR Reponse to exercise;Benefits of Exercise;End point of exercise;Cardiac Anatomy            Exercise Follow-up/Discharge  Follow up/Discharge: Pt reports that he is walking faster at home, pleased with progression. Pt is walking 3-4x/week 30-40 min NUTRITION  Nutrition Assessment: Reassessment    Nutrition Risk Factors:  Nutrition Risk Factors: Dyslipidemia;Overweight  Cholesterol: 99 (7/5/18)  LDL: 55  HDL: 33  Triglycerides: 56    Nutrition Plan  Interventions  Diet Consult: Declined  Other Nutrition Intervention: Diet Class;Therapist/Pt Discussion;Provide with Written Material  Initial Rate Your Plate Score: 64      Education Completed  Nutrition Education Completed: Low Saturated fat diet;Risk factor overview;Low sodium diet    Goals  Nutrition Goals (Next 30 days): Patient demonstrated understanding of cardiac nutrition, no goals identified for the next 30 days    Goals Met  Nutrition Goals Met: Patient can identify their risk factors for CAD;Patient follows a low sodium diet;Patient states following a low saturated fat diet;Patient has lost weight;Patient knows appropriate portion size    Height, Weight, and  BMI  Weight: 165 lb (74.8 kg)  Height: 5' 8\" (1.727 m)  BMI: 25.09    Nutrition Follow-up  Follow-up/Discharge: Patient declines to meet with a dietician. Patient states that he does not add salt to his food. He does try to eat more turkey, chicken and fish.        Other Risk Factors  Other Risk Factor Assessment: Reassessment    HTN Risk Factor: Hypertension    Pre Exercise BP: 106/60  Post Exercise BP: 114/60    Hypertension Plan  Goals  HTN Goals: Patient demonstrates understanding of HTN, no goals identified for the next 30 days    Goals Met  HTN Goals Met: Follow low sodium diet;Take medication as prescribed;Exercises regularly    HTN Interventions  HTN Interventions: Diet consult;Therapist/patient " discussion;Offer educational classes    HTN Education Completed  HTN Education Completed: Low sodium diet;Risk factor overview    Tobacco Risk Factor: NA    Risk Factor Follow-up   Follow-up/Discharge: Pt denies history of hypertension.   PSYCHOSOCIAL  Psychosocial Assessment: Reassessment     Psychosocial Risk Factor: NA    Psychosocial Plan  Interventions  Interventions: Offer educational videos and classes;Provide written material;Individual education and counseling    Education Completed  Education Completed: Relaxation/Coping Techniques    Goals  Goals (Next 30 days): Improvement in [employer] COOP score    Goals Met  Goals Met: Identified Support system;Practicing stress management skills    Psychosocial Follow-up  Follow-up/Discharge: Pt denies regular stress. He does state that he enjoys reading when stressed           Patient involved in Goal setting?: Yes    Signature: _____________________________________________________________    Date: __________________    Time: __________________

## 2021-06-20 NOTE — PROGRESS NOTES
[facility] Home Exercise Program/Discharge Summary  You have reached a 3.5-5.2 MET level and have completed 21 sessions of Cardiac Rehab.   Exercise Goals:   4-6x/week for aerobic exercise 30-60 minutes  Modality Duration Intensity/  Rate of Perceived Exertion  (RPE: 11-14)   Warm-up 5 minutes 8-10   Walk 20 11-14   Bike 20 11-14   Treadmill 20 11-14   Cool Down 5 minutes 8-10   Stretching 5 minutes 8-10   Heart Rate Guidelines:   20-30bpm> RHR (Resting Heart Rate)  Special Recommendations:    Continue to follow low fat, low salt, heart healthy diet.    Continue to follow up with your doctors/providers as recommended (e.g cholesterol).    A well rounded exercise program will included aerobic/cardiovascular exercise (e.g like walking, biking, or swimming ), strength training (e.g. free weights, exercise bands, or weight machines) and stretching program.   Stop Exercise!!! If any of the following occur:    Angina/chest pain    Dizziness    Excessive perspiration/cold sweats    Abnormal shortness of breath    Changes in heart rate (slow, fast, irregular)    Sudden fatigue or numbness    Nausea  Also...    Avoid extreme temperatures - exercise indoors if necessary:   Temp+ Humidity >160, Temp-Wind Chill <20    Wait at least 1 hour after a meal before strenuous activity    Do not exercise if you have a fever or are ill    Wear comfortable, supportive athletic clothing and shoes.  You are now on your way to a heart healthy lifestyle on your own. You can do it!

## 2021-06-23 NOTE — PATIENT INSTRUCTIONS - HE
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

## 2021-06-23 NOTE — PROGRESS NOTES
[facility] Heart Care Note    Assessment:  PVCs associated with palpitations       Holter monitors in the past have shown up to 14,000 PVCs per day       Was intolerant to empiric treatment with sotalol, amiodarone  PSVT; slow pathway modification around 2010; AVNRT  Recurrence of SVT-unusual HR = 110 bpm.  This may represent unusual pattern of AVNRT considering the slow rate  Coronary artery disease with history of myocardial infarction 1997 with two-vessel bypass graft including bypass to LAD and right coronary artery  Complex intervention of distal right coronary artery via ESPERANZA; 3 drug-eluting stents July 5, 2018  Sinus bradycardia  Preserved left ventricular function; echocardiogram March 26, 2017 showed normal ejection fraction  Excellent lipid control on high-dose atorvastatin    Plan:  Had a successful coronary stent intervention on July 5, 2018; 3 drug-eluting stents were placed to the right coronary artery through the right internal mammary artery  You continue to have episodes of abrupt tachycardia; 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 male with a known history of artery disease and tachycardia  He underwent a stress nuclear scan that was abnormal this led to coronary angiography.  He had quite a complex intervention by Dr. Calzada.    On July 5, 2018 she was able to go through the right internal mammary artery and placed 3 stents to the distal right coronary artery with good results  He has had no recurrence of  angina  Continues to have some shortness of breath on exertion especially in cold weather  Continues to have episodes of tachycardia about 3-4 times a month.  These episodes start abruptly have a regular pattern and can terminate with Valsalva maneuver sitting or doing other maneuvers.  They seem somewhat similar to his previous tachycardia but not nearly as fast; in 2010 his SVT was about 160 bpm now more like 110 bpm  Is on Lanoxin 0.25 mg/day but felt poorly on this medicine.  He has been able to tolerate 0.125 mg daily but no apparent benefit    Syncopal or near syncopal episodes  Breathing is satisfactory  Minimal pedal edema            Problem List:  Patient Active Problem List   Diagnosis     Hyperlipidemia LDL goal <70     SVT (supraventricular tachycardia) (H)     Chronic obstructive pulmonary disease, unspecified COPD type (H)     PVC (premature ventricular contraction)     CAD (coronary artery disease)     Dyspnea on exertion     GUERRERO (dyspnea on exertion)     Abnormal nuclear stress test     Essential hypertension     Medical History:  Past Medical History:   Diagnosis Date     Anxiety      Arrhythmia     pvc and vt     Basal cell carcinoma     chin     COPD (chronic obstructive pulmonary disease) (H)      Coronary artery disease      GERD (gastroesophageal reflux disease)      Herniated disc      Hyperlipidemia      Hypertension      Myocardial infarction (H) 1997     PVC (premature ventricular contraction)      Rhinitis      SVT (supraventricular tachycardia) (H)      Syncope      Surgical History:  Past Surgical History:   Procedure Laterality Date     BACK SURGERY       CARDIAC ELECTROPHYSIOLOGY MAPPING AND ABLATION       CORONARY ARTERY BYPASS GRAFT  1997    Comments: X 2 LIMA to LAD ESPERANZA to RCA     CV CORONARY ANGIOGRAM N/A 6/29/2018    Procedure: Coronary Angiogram;  Surgeon: Carito Flannery MD;  Location: [facility];  Service:      CV CORONARY ANGIOGRAM N/A 7/5/2018    Procedure:  Coronary Angiogram;  Surgeon: Carito Flannery MD;  Location: [facility];  Service:      CV LEFT HEART CATHETERIZATION WO LEFT VETRICULOGRAM Left 2018    Procedure: Left Heart Catheterization Without Left Ventriculogram;  Surgeon: Carito Flannery MD;  Location: [facility];  Service:      HAND SURGERY       HERNIA REPAIR       MOHS SURGERY       AR ABLATE HEART DYSRHYTHM FOCUS      Description: Catheter Ablation Atrial Supraventricular Tachycardia;  Proc Date: 2010;  Comments: AVNRT  cryoablation     AR REPAIR EPIGASTRIC HERNIA,REDUC N/A 2014    Procedure: EPIGASTRIC INCISIONAL HERNIA REPAIR ;  Surgeon: Daniel Gutierrez MD;  Location: [facility];  Service: General     TONSILLECTOMY       Social History:  Social History     Socioeconomic History     Marital status:      Spouse name: Not on file     Number of children: Not on file     Years of education: Not on file     Highest education level: Not on file   Social Needs     Financial resource strain: Not on file     Food insecurity - worry: Not on file     Food insecurity - inability: Not on file     Transportation needs - medical: Not on file     Transportation needs - non-medical: Not on file   Occupational History     Not on file   Tobacco Use     Smoking status: Former Smoker     Last attempt to quit: 1973     Years since quittin.0     Smokeless tobacco: Never Used   Substance and Sexual Activity     Alcohol use: Yes     Alcohol/week: 0.6 oz     Types: 1 Glasses of wine per week     Comment: daily with dinner     Drug use: No     Sexual activity: Not on file   Other Topics Concern     Not on file   Social History Narrative     Not on file       Review of Systems:      General: WNL  Eyes: WNL  Ears/Nose/Throat: WNL  Lungs: WNL  Heart: Shortness of Breath with activity, Irregular Heartbeat(palpations)  Stomach: WNL  Bladder: WNL  Muscle/Joints: WNL  Skin: WNL  Nervous System: Loss of Balance  Mental Health:  WNL     Blood: Easy Bruising        Family History:  Family History   Problem Relation Age of Onset     Hypertension Father          Allergies:  Allergies   Allergen Reactions     Amiodarone Other (See Comments)     Fatigue, shaking     Amiodarone Analogues Myalgia       Medications:  Current Outpatient Medications   Medication Sig Dispense Refill     aspirin 81 MG EC tablet Take 1 tablet (81 mg total) by mouth daily.       atorvastatin (LIPITOR) 80 MG tablet Take 80 mg by mouth daily.       betamethasone dipropionate (DIPROLENE) 0.05 % cream Apply 1 application topically 2 (two) times a day as needed.       clopidogrel (PLAVIX) 75 mg tablet Take 1 tablet (75 mg total) by mouth daily. 90 tablet 3     desonide (DESOWEN) 0.05 % cream Apply topically 2 (two) times a day as needed.       fluticasone (FLONASE) 50 mcg/actuation nasal spray Apply 2 sprays into each nostril every evening.        glucosamine sulfate (GLUCOSAMINE) 750 mg Tab Take 1,500 mg by mouth daily.        ipratropium (ATROVENT) 0.06 % nasal spray Apply 2 sprays into each nostril 2 (two) times a day.       melatonin 3 mg Tab Take 6 mg by mouth bedtime.        methylcellulose (CITRUCEL) Take 2 g by mouth daily. (1 tablespoons)       metoprolol tartrate (LOPRESSOR) 25 MG tablet Take 25 mg by mouth 2 (two) times a day with meals.       multivitamin (MULTIVITAMIN) per tablet Take 1 tablet by mouth daily.       nitroglycerin (NITROSTAT) 0.4 MG SL tablet Place 0.4 mg under the tongue every 5 (five) minutes as needed for chest pain.       omeprazole (PRILOSEC) 20 MG capsule Take 20 mg by mouth daily.       sodium chloride (OCEAN) 0.65 % nasal spray Apply 2 sprays into each nostril every morning.       vitamin A-vitamin C-vit E-min (OCUVITE) Tab tablet Take 1 tablet by mouth daily.       diltiazem (CARDIZEM SR) 120 MG 12 hr capsule Take 1 capsule (120 mg total) by mouth daily. 90 capsule 3     No current facility-administered medications for this visit.   "    Objective:   Vital signs:  /70 (Patient Site: Left Arm, Patient Position: Sitting, Cuff Size: Adult Regular)   Pulse 60   Resp 12   Ht 5' 8\" (1.727 m)   Wt 168 lb 14.4 oz (76.6 kg)   BMI 25.68 kg/m        Physical Exam:      GENERAL APPEARANCE: Alert, cooperative and in no acute distress.  HEENT: No scleral icterus. No Xanthelasma. Oral mucous membranes pink and moist.  NECK: JVP normal cm. No Hepatojugular reflux. Thyroid not  Palpable  CHEST: clear to auscultation and percussion  CARDIOVASCULAR: S1, S2 without murmur    Brachial, radial  pulses are intact and symmetric.   No carotid bruits noted.  ABDOMEN: Non tender. BS+. No bruits.  EXTREMITIES: No cyanosis, clubbing or edema.    Lab Results:  LIPIDS:  Lab Results   Component Value Date    CHOL 115 12/04/2018    CHOL 99 07/05/2018    CHOL 108 04/18/2018     Lab Results   Component Value Date    HDL 41 12/04/2018    HDL 33 (L) 07/05/2018    HDL 40 04/18/2018     Lab Results   Component Value Date    LDLCALC 63 12/04/2018    LDLCALC 55 07/05/2018    LDLCALC 59 04/18/2018     Lab Results   Component Value Date    TRIG 54 12/04/2018    TRIG 56 07/05/2018    TRIG 46 04/18/2018     No components found for: CHOLHDL    BMP:  Lab Results   Component Value Date    CREATININE 0.78 12/04/2018    BUN 13 12/04/2018     (L) 12/04/2018    K 4.5 12/04/2018     12/04/2018    CO2 27 12/04/2018         This note has been dictated using voice recognition software. Any grammatical or context distortions are unintentional and inherent to the software.  Efe Doan MD  [facility]  [phone]            "

## 2021-06-23 NOTE — TELEPHONE ENCOUNTER
Phone call from patient wanting to clarify his Diltiazem order.  There has been some confusion with his dose.  Explained that due to availability the dose was changed, but since the patient has picked up the 120 mg dose he should continue with this dose and monitor for symptoms of SVT per Dr. Doan.  Pt states that he can feel his episodes of SVT and has about 2-3 episodes per month.  Requested that he call with an update if he continues to have episodes.  Pt states understanding and reviewed contact information for further questions.

## 2021-06-25 NOTE — PROGRESS NOTES
Progress Notes by Renetta Crabtree MD at 5/24/2017  8:50 AM     Author: Renetta Crabtree MD Service: -- Author Type: Physician    Filed: 5/24/2017  8:48 AM Encounter Date: 5/24/2017 Status: Signed    : Renetta Crabtree MD (Physician)                  [email or web address]           Thank you Dr. Conway for asking the [facility] team to participate in the care of your patient, Damian Butler.     Impression and Plan     1. Coronary artery disease. Roderick has known coronary disease, having suffered a myocardial infarction in 1997. At that time, he underwent 2-vessel bypass surgery with tiny graft to the LAD and ESPERANZA graft to the RCA.     This is stable. Patient reports no chest pain or other concerning symptoms in this regard.     2. History of supraventricular tachycardia. This has been fairly quiescent with the exception of rare and brief subjective recurrences only.  He on average has 1-2 to perhaps 3 episodes per month. These are readily terminated with Valsalva type maneuvers.  Damian is comfortable with how he is performing in this regard.     3. Hypertension. Blood pressure is very reasonable in the office today at 122/66 mmHg.     4. Dyslipidemia. Recent lipid profile 26 January 2017 was at/near target with LDL 75 mg/dL and HDL 37 mg/dL.     I will plan on seeing Damian again in 1 year.           History of Present Illness    Once again I would like to thank you again for asking me to participate in the care of your patient, Damian Butler.  As you know, but to reiterate for my own records, Damian Butler is a 76 y.o. male with known coronary disease, having suffered a myocardial infarction in 1997. At that time, he underwent 2-vessel bypass surgery with tiny graft to the LAD and ESPERANZA graft to the RCA. Historically, he has had well-preserved LV systolic performance. He also has a history of having AV moy reentry tachycardia for which he underwent ablative therapy in  "April 2010.     Since my last visit with Roderick, he states that he has noted occasional subjective recurrence of his SVT which on average occurs one to 3 times per month that was readily terminated with Valsalva type maneuver.  He denies any chest pain.  He reports some mild shortness of breath though this is stable and unchanged from last year.  He denies any angina type chest pain.  Parenthetically, he was recently evaluated in the emergency department for some left shoulder/scapular pain though this was felt musculoskeletal in nature.  He has had no recurrence.  He denies any significant lightheadedness.    Further review of systems is otherwise negative/noncontributory (medical record and 13 point review of systems reviewed as well and pertinent positives noted).         Cardiac Diagnostics      Echocardiogram 26 March 2017:  1. Normal left ventricular size and systolic performance with ejection fraction of 55%.  2. Mild aortic insufficiency.  3. Normal right ventricular size and systolic performance.  4. Mild left atrial enlargement.    Echocardiogram 20 November 2009 (stress echocardiogram):  1. Normal LV size and function.  2. No significant valvular heart disease.    3. There was no evidence of ischemia.     Nuclear stress perfusion studies 12 November 2010 :  1. Splanchnic artifact, though there was a small-medium sized inferior and inferolateral defect consistent with small infarction and small area of ischemia.   2. Normal left ventricular systolic performance with ejection fraction of 56%.    3. It is felt unchanged from March of 2009.     24-hour Holter monitor 19 February 2011:  1. Frequent PVCs suggestive of inferior left ventricular origin.            Physical Examination       /66 (Patient Site: Right Arm, Patient Position: Sitting, Cuff Size: Adult Regular)  Pulse 64  Resp 18  Ht 5' 8\" (1.727 m)  Wt 170 lb (77.1 kg)  BMI 25.85 kg/m2        Wt Readings from Last 3 Encounters:   05/24/17 170 " lb (77.1 kg)   03/26/17 169 lb 12.8 oz (77 kg)   06/27/16 173 lb 6.4 oz (78.7 kg)     The patient is alert and oriented times three. Sclerae are anicteric. Mucosal membranes are moist. Jugular venous pressure is normal. No significant adenopathy/thyromegally appreciated. Lungs are clear with good expansion. On cardiovascular exam, the patient has a regular S1 and S2. Abdomen is soft and non-tender. Extremities reveal no clubbing, cyanosis, or edema.         Imaging     Chest radiograph 20 May 2017:  1. Poststernotomy changes are noted.   2. Lungs are hyperexpanded consistent with COPD.   3. They are clear.   4. No signs of pneumonia or failure.   5. No effusions.           Family History/Social History/Risk Factors   Patient does not smoke.  Family history of hypertension.         Medications  Allergies   Current Outpatient Prescriptions   Medication Sig Dispense Refill   ? aspirin 325 MG EC tablet Take 325 mg by mouth daily.     ? atorvastatin (LIPITOR) 80 MG tablet Take 80 mg by mouth daily.     ? betamethasone dipropionate (DIPROLENE) 0.05 % cream Apply 1 application topically 2 (two) times a day as needed.     ? desonide (DESOWEN) 0.05 % cream Apply topically 2 (two) times a day as needed.     ? fluticasone (FLONASE) 50 mcg/actuation nasal spray Apply 2 sprays into each nostril every evening.      ? glucosamine sulfate (GLUCOSAMINE) 750 mg Tab Take 1,500 mg by mouth daily.      ? ipratropium (ATROVENT) 0.06 % nasal spray Apply 2 sprays into each nostril 2 (two) times a day.     ? melatonin 3 mg Tab Take 6 mg by mouth bedtime.      ? methylcellulose (CITRUCEL) Take 2 g by mouth daily. (1 tablespoons)     ? metoprolol tartrate (LOPRESSOR) 25 MG tablet Take 25 mg by mouth 2 (two) times a day with meals.     ? multivitamin (MULTIVITAMIN) per tablet Take 1 tablet by mouth daily.     ? nitroglycerin (NITROSTAT) 0.4 MG SL tablet Place 0.4 mg under the tongue every 5 (five) minutes as needed for chest pain.     ?  omeprazole (PRILOSEC) 20 MG capsule Take 20 mg by mouth daily.     ? sodium chloride (OCEAN) 0.65 % nasal spray Apply 2 sprays into each nostril every morning.     ? vitamin A-vitamin C-vit E-min (OCUVITE) Tab tablet Take 1 tablet by mouth daily.       No current facility-administered medications for this visit.       Allergies   Allergen Reactions   ? Amiodarone Other (See Comments)     Fatigue, shaking   ? Amiodarone Analogues Myalgia          Lab Results   Lab Results   Component Value Date     05/20/2017    K 3.8 05/20/2017     05/20/2017    CO2 26 05/20/2017    BUN 20 05/20/2017    CREATININE 1.02 05/20/2017    CALCIUM 8.9 05/20/2017     Lab Results   Component Value Date    WBC 10.3 05/20/2017    HGB 13.9 (L) 05/20/2017    HCT 42.2 05/20/2017    MCV 92 05/20/2017     05/20/2017     Lab Results   Component Value Date    CHOL 124 01/26/2017    TRIG 61 01/26/2017    HDL 37 (L) 01/26/2017    LDLCALC 75 01/26/2017     Lab Results   Component Value Date    BNP 63 05/20/2017     Lab Results   Component Value Date    CKTOTAL 42 03/24/2017    TROPONINI 0.01 05/20/2017    TROPONINI 0.01 05/20/2017    TROPONINI 0.01 03/25/2017     Lab Results   Component Value Date    TSH 3.0 01/31/2011

## 2021-06-26 NOTE — PROGRESS NOTES
Progress Notes by Vaibhav Verma NP at 7/13/2018 10:30 AM     Author: Vaibhav Verma NP Service: -- Author Type: Nurse Practitioner    Filed: 7/13/2018 11:46 AM Encounter Date: 7/13/2018 Status: Signed    : Vaibhav Verma NP (Nurse Practitioner)                 Click to link to [facility]       [facility] HEART CARE NOTE      Assessment/Recommendations   1.  Coronary artery disease with s/p CABG in 1992: Patient underwent stress tests on 6/22/2018 showed medium-sized area of mixed ischemia and nontransmural infarction in the inferior and inferolateral segment of the LV, ejection fraction was 66% and at intermediate risk for future cardiac event. Coronary angiogram on  showed on 7/5/2018 showed 95% stenosis in distal RCA which was treated successfully with a drug-eluting stent ×3 through the ESPERANZA graft although was challenging. Dual antiplatelet therapy is being used with aspirin indefinitely and clopidogrel for 1 year.   We discussed the importance of antiplatelet therapy and talking with his cardiologist prior to stopping these medications for any reason.    Patient denies any chest pain, shortness of breath or heart palpitation since recent stent placement.  Risk factor modification and lifestyle management topics were discussed including managing comorbidities, weight loss, heart healthy diet, exercise and alcohol use.  He is initiating his cardiac rehab soon.  Patient was encouraged to call back or seek medical attention if recurrent shortness of breath or heart palpitation.    2.  Dyslipidemia: Damian Butler is on high intensity statin therapy with atorvastatin 80 mg daily. Most recent LDL is 55.  We discussed a diet low in saturated fat, weight loss, and exercise along with medication for better control of cholesterol.     3.  Hypertension: His blood pressure is 126/60 and heart rate 72.  He is on metoprolol tartrate 25 mg twice a day.     4.  History of SVT and status post ablation in  2010: Recent  patch monitor report in April 2018 showed SVTs and PVCs correlating with his heart palpitation. Dr. Doan started him on Digoxin. He also recommended switch from digoxin to flecainide if normal stress test. However, stress test was abnormal which led him to have coronary angiogram and stent placemente. He is currently on digoxin 125 MCG daily. His asymptomatic.    Follow-up appointment with Dr. Crabtree in August as scheduled     History of Present Illness    Damian Butler is 77 y.o. with a past medical history of SVT with status post ablation in 2010, PVCs, coronary artery disease with status post CABG in 1997, hypertension, hyperlipidemia and COPD who is seen at [facility] for post coronary intervention follow up.  Patient saw Dr. Crabtree in March 2018 with recurrent episodes of heart palpitation with SVTs.  He wore a patch monitor showed SVTs and PVCs correlating with his heart palpitation.  He saw Dr. Doan in June 2018.  He started him on Digoxin but recommended to switch  to flecainide if normal stress tests.  However, stress test showed medium-sized area of mixed ischemia and nontransmural infarction in the inferior and inferolateral segment of the LV, ejection fraction was 66% and at intermediate risk for future cardiac event. This led him to have a coronary angiogram on showed on 7/5/2018 showed 95% stenosis in distal RCA which was treated successfully with a drug-eluting stent ×3 through the ESPERANZA graft although was challenging. Dual antiplatelet therapy is being used with aspirin indefinitely and clopidogrel for 1 year.      Today, patient fatigue, lightheadedness, shortness of breath, dyspnea on exertion, orthopnea, PND, palpitations, chest pain, abdominal fullness/bloating and lower extremity edema. He is initiating his cardiac rehab on 7/20/2018.  He has a follow-up appointment with Dr. Crabtree in August 2018.  He reports following heart healthy diet at home.  He does get  mild swelling in his lower extremities and wear compression stocking.    Coronary Angiogram 7/5/2018  Conclusion     Estimated blood loss was <20 ml.    Successful but challenging PCI of the distal RCA via the ESPERANZA graft with placement of three LUCILLE.       Recommendations   ? The patient will be observed in telemetry overnight and if stable, the patient will be allowed to return home tomorrow.  ? Recommended follow up with Dr. Crabtree in 6 weeks.  ? Continuation of dual antiplatelet therapy for 12 month(s).  ? Continue high dose statin therapy indefinitely.  ? Follow up visit with Nurse Practitioner in 1-2 weeks.  ? Cardiac rehabilitation.        Physical Examination Review of Systems   Vitals:    07/13/18 1031   BP: 120/60   Pulse: 72   Resp: 18     Body mass index is 25.24 kg/(m^2).  Wt Readings from Last 3 Encounters:   07/13/18 166 lb (75.3 kg)   07/06/18 161 lb 8 oz (73.3 kg)   06/29/18 165 lb 12.8 oz (75.2 kg)       General Appearance:     Alert, cooperative and in no acute distress.   ENT/Mouth: membranes moist, no oral lesions or bleeding gums.      EYES:  no scleral icterus, normal conjunctivae   Neck: no carotid bruits or thyromegaly   Chest/Lungs:   lungs are clear to auscultation, no rales or wheezing, respirations unlabored   Cardiovascular:    Heart rateRegular. Normal first and second heart sounds with 2/6  Murmurs in USB, rubs, or gallops; the carotid, radial and posterior tibial pulses are intact, Jugular venous pressure flat with no HJR, mild edema bilateral lower extremities    Abdomen:  Soft, nontender, nondistended, bowel sounds present   Extremities: no cyanosis or clubbing   Skin:  Neurologic: warm, dry.  mood and affect are appropriate, alert and oriented x3     Puncture Site: Right femoral site is soft with mild bruising- no hematoma.  Radial pulses and Pedal pulses intact and symmetrical.  CMS intact.      General: WNL  Eyes: WNL  Ears/Nose/Throat: WNL  Lungs: WNL  Heart: WNL  Stomach:  WNL  Bladder: WNL  Muscle/Joints: WNL  Skin: WNL  Nervous System: WNL  Mental Health: WNL     Blood: WNL     Medical History  Surgical History Family History Social History   Past Medical History:   Diagnosis Date   ? Anxiety    ? Arrhythmia     pvc and vt   ? Basal cell carcinoma     chin   ? COPD (chronic obstructive pulmonary disease) (H)    ? Coronary artery disease    ? GERD (gastroesophageal reflux disease)    ? Herniated disc    ? Hyperlipidemia    ? Hypertension    ? Myocardial infarction 1997   ? PVC (premature ventricular contraction)    ? Rhinitis    ? SVT (supraventricular tachycardia) (H)    ? Syncope     Past Surgical History:   Procedure Laterality Date   ? BACK SURGERY     ? CARDIAC ELECTROPHYSIOLOGY MAPPING AND ABLATION     ? CORONARY ARTERY BYPASS GRAFT  1997    Comments: X 2 LIMA to LAD ESPERANZA to RCA   ? CV CORONARY ANGIOGRAM N/A 6/29/2018    Procedure: Coronary Angiogram;  Surgeon: Carito Flannery MD;  Location: [facility] Cath Lab;  Service:    ? CV CORONARY ANGIOGRAM N/A 7/5/2018    Procedure: Coronary Angiogram;  Surgeon: Carito Flannery MD;  Location: [facility] Cath Lab;  Service:    ? CV LEFT HEART CATHETERIZATION WO LEFT VETRICULOGRAM Left 6/29/2018    Procedure: Left Heart Catheterization Without Left Ventriculogram;  Surgeon: Carito Flannery MD;  Location: [facility] Cath Lab;  Service:    ? HAND SURGERY     ? HERNIA REPAIR     ? MOHS SURGERY     ? VA ABLATE HEART DYSRHYTHM FOCUS      Description: Catheter Ablation Atrial Supraventricular Tachycardia;  Proc Date: 04/20/2010;  Comments: AVNRT  cryoablation   ? VA REPAIR EPIGASTRIC HERNIA,REDUC N/A 7/2/2014    Procedure: EPIGASTRIC INCISIONAL HERNIA REPAIR ;  Surgeon: Daniel Gutierrez MD;  Location: [facility] OR;  Service: General   ? TONSILLECTOMY      Family History   Problem Relation Age of Onset   ? Hypertension Father     Social History     Social History   ? Marital status:      Spouse name: N/A   ? Number of children:  N/A   ? Years of education: N/A     Occupational History   ? Not on file.     Social History Main Topics   ? Smoking status: Former Smoker     Quit date: 1/1/1973   ? Smokeless tobacco: Never Used   ? Alcohol use 0.6 oz/week     1 Glasses of wine per week      Comment: daily with dinner   ? Drug use: No   ? Sexual activity: Not on file     Other Topics Concern   ? Not on file     Social History Narrative          Medications  Allergies   Current Outpatient Prescriptions   Medication Sig Dispense Refill   ? aspirin 81 MG EC tablet Take 1 tablet (81 mg total) by mouth daily.     ? atorvastatin (LIPITOR) 80 MG tablet Take 80 mg by mouth daily.     ? betamethasone dipropionate (DIPROLENE) 0.05 % cream Apply 1 application topically 2 (two) times a day as needed.     ? clopidogrel (PLAVIX) 75 mg tablet Take 1 tablet (75 mg total) by mouth daily. 90 tablet 3   ? desonide (DESOWEN) 0.05 % cream Apply topically 2 (two) times a day as needed.     ? digoxin (LANOXIN) 250 mcg tablet Take 0.5 tablets (125 mcg total) by mouth daily. 90 tablet 5   ? fluticasone (FLONASE) 50 mcg/actuation nasal spray Apply 2 sprays into each nostril every evening.      ? glucosamine sulfate (GLUCOSAMINE) 750 mg Tab Take 1,500 mg by mouth daily.      ? ipratropium (ATROVENT) 0.06 % nasal spray Apply 2 sprays into each nostril 2 (two) times a day.     ? melatonin 3 mg Tab Take 6 mg by mouth bedtime.      ? methylcellulose (CITRUCEL) Take 2 g by mouth daily. (1 tablespoons)     ? metoprolol tartrate (LOPRESSOR) 25 MG tablet Take 25 mg by mouth 2 (two) times a day with meals.     ? multivitamin (MULTIVITAMIN) per tablet Take 1 tablet by mouth daily.     ? nitroglycerin (NITROSTAT) 0.4 MG SL tablet Place 0.4 mg under the tongue every 5 (five) minutes as needed for chest pain.     ? omeprazole (PRILOSEC) 20 MG capsule Take 20 mg by mouth daily.     ? sodium chloride (OCEAN) 0.65 % nasal spray Apply 2 sprays into each nostril every morning.     ? vitamin  A-vitamin C-vit E-min (OCUVITE) Tab tablet Take 1 tablet by mouth daily.       No current facility-administered medications for this visit.       Allergies   Allergen Reactions   ? Amiodarone Other (See Comments)     Fatigue, shaking   ? Amiodarone Analogues Myalgia         Lab Results    Chemistry CBC BNP   Lab Results   Component Value Date    CREATININE 0.68 (L) 07/06/2018    BUN 12 07/06/2018     (L) 07/06/2018    K 4.1 07/06/2018     07/06/2018    CO2 24 07/06/2018     Creatinine (mg/dL)   Date Value   07/06/2018 0.68 (L)   07/05/2018 0.86   06/29/2018 0.85   04/18/2018 0.83    Lab Results   Component Value Date    WBC 9.3 07/05/2018    HGB 12.2 (L) 07/06/2018    HCT 37.5 (L) 07/05/2018    MCV 89 07/05/2018     07/05/2018    Lab Results   Component Value Date    BNP 63 05/20/2017     BNP (pg/mL)   Date Value   05/20/2017 63        40  minutes were spent with the patient with greater than 50% spent on education and counseling.    Vaibhav Verma [facility]      This note has been dictated using voice recognition software. Any grammatical, typographical, or context distortions are unintentional and inherent to the software

## 2021-06-26 NOTE — PROGRESS NOTES
Progress Notes by Renetta Crabtree MD at 8/16/2018  1:10 PM     Author: Renetta Crabtree MD Service: -- Author Type: Physician    Filed: 8/16/2018  1:13 PM Encounter Date: 8/16/2018 Status: Signed    : Renetta Crabtree MD (Physician)                  [email or web address]  [phone]         Thank you Dr. Monroy for asking the [facility] Heart Care team to participate in the care of your patient, Damian Butler.     Impression and Plan     1. Coronary artery disease. Roderick has known coronary disease, having suffered a myocardial infarction in 1997. At that time, he underwent 2-vessel bypass surgery with tiny graft to the LAD and ESPERANZA graft to the RCA.    More recently, patient underwent repeat angiography and on 5 July 2018 had uccessful PCI of distal right coronary artery via ESPERANZA graft with placement of 3 drug-eluting stents (3.5×28 mm, 3.5×24 mm, and 3.0×12 mm Synergy drug-eluting stents).      This is stable. Patient reports no chest pain or other concerning symptoms in this regard.    2. History of supraventricular tachycardia.  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 Historically he has seen Dr. Efe Doan in the electrophysiology department.  This has been fairly quiescent, though he has had some intermittent recurrences.  With his SVT, his heart rate is actually not all that fast at 110 bpm.,  Only, Valsalva type maneuvers will terminate the rhythm disturbance..  On 1 June 2018, he was started on digoxin under the auspices of Dr. Doan.  He does feel as though this has helped.    3. Hypertension. Blood pressure is very reasonable in the office today.    4. Dyslipidemia.  Lipid profile 5 July 2018 was favorable with LDL 55 mg/dL and HDL 33 mg/dL.    I will plan on seeing Damian again in 6 months.            History of Present Illness    Once again I would like to thank you again for asking me  to participate in the care of your patient, Damian Butler.  As you know, but to reiterate for my own records, Damian Butler is a 77 y.o. male with known coronary disease, having suffered a myocardial infarction in 1997. At that time, he underwent 2-vessel bypass surgery with tiny graft to the LAD and ESPERANZA graft to the RCA. Historically, he has had well-preserved LV systolic performance.     More recently, patient underwent repeat angiography and on 5 July 2018 had successful PCI of distal right coronary artery via ESPERANZA graft with placement of 3 drug-eluting stents (3.5×28 mm, 3.5×24 mm, and 3.0×12 mm Synergy drug-eluting stents).    He also has a history of having AV moy reentry tachycardia for which he underwent ablative therapy in April 2010.  He had been demonstrating some increased episodes of SVT though rate relatively slow at approximately 110 bpm.  Patient was recently started on digoxin under the auspices of Dr. Efe Doan.     In follow-up today, Damian states that since his revascularization procedure he does feel as though his stamina is improved.  He has been participating in cardiac rehab without chest pain and has been progressing as expected.  He continues to have some intermittent palpitations reminiscent of his SVT though these are generally short-lived and terminate with Valsalva type maneuvers.    Further review of systems is otherwise negative/noncontributory (medical record and 13 point review of systems reviewed as well and pertinent positives noted).         Cardiac Diagnostics      Echocardiogram 26 March 2017:  1. Normal left ventricular size and systolic performance with ejection fraction of 55%.  2. Mild aortic insufficiency.  3. Normal right ventricular size and systolic performance.  4. Mild left atrial enlargement.    Echocardiogram 20 November 2009 (stress echocardiogram):  1. Normal LV size and function.  2. No significant valvular heart disease. ?  3. There was no evidence  "of ischemia.    Nuclear perfusion imaging study 22 June 2018 (pre-coronary angiogram):  1. Medium-sized area of mixed ischemia and nontransmural infarction in the inferior and inferolateral segments.  2. Normal left ventricular systolic performance with ejection fraction of 66%.    Nuclear stress perfusion studies 12 November 2010 :  1. Splanchnic artifact, though there was a small-medium sized inferior and inferolateral defect consistent with small infarction and small area of ischemia.  2. Normal left ventricular systolic performance with ejection fraction of 56%. ?  3. It is felt unchanged from March of 2009.    Coronary angiography 5 July 2018:  1. Successful PCI of distal right coronary artery via ESPERANZA graft with placement of 3 drug-eluting stents (3.5×28 mm, 3.5×24 mm, and 3.0×12 mm Synergy drug-eluting stents).    Coronary angiography 29 June 2018:  1. Left Main coronary artery: 10% stenosis.  2. Left anterior descending coronary artery: Proximal-mid 100% stenosis.  First a 50% stenosis.  3. Circumflex coronary artery: Moderate size vessel and angiographically normal.  4. Right coronary artery: 100% mid stenosis with distal 80% stenosis.  5. ESPERANZA graft to mid right coronary artery is large and angiographically normal.  6. LIMA graft to mid LAD is large and angiographically normal.    1 patch monitor 16 April 2018 through 16 May 2018:  1. The palpitations and rapid heart beating associated with supraventricular tachycardia, probably AV moy reentry rate 115 beats per minute on 04/29/2018 and 05/01/2018.    2. On other occasions palpitations were associated with ventricular premature beats or ventricular couplet or with sinus rhythm or sinus tachycardia without ectopy.    24-hour Holter monitor 19 February 2011:  1. Frequent PVCs suggestive of inferior left ventricular origin.         Physical Examination       /60  Pulse 64  Resp 14  Ht 5' 8\" (1.727 m)  Wt 167 lb (75.8 kg)  BMI 25.39 kg/m2      "   Wt Readings from Last 3 Encounters:   08/16/18 167 lb (75.8 kg)   08/15/18 167 lb (75.8 kg)   08/13/18 168 lb (76.2 kg)     The patient is alert and oriented times three. Sclerae are anicteric. Mucosal membranes are moist. Jugular venous pressure is normal. No significant adenopathy/thyromegally appreciated. Lungs are clear with good expansion. On cardiovascular exam, the patient has a regular S1 and S2. Abdomen is soft and non-tender. Extremities reveal no clubbing, cyanosis, or edema.         Family History/Social History/Risk Factors   Patient does not smoke.  Family history of hypertension.         Medications  Allergies   Current Outpatient Prescriptions   Medication Sig Dispense Refill   ? aspirin 81 MG EC tablet Take 1 tablet (81 mg total) by mouth daily.     ? atorvastatin (LIPITOR) 80 MG tablet Take 80 mg by mouth daily.     ? betamethasone dipropionate (DIPROLENE) 0.05 % cream Apply 1 application topically 2 (two) times a day as needed.     ? clopidogrel (PLAVIX) 75 mg tablet Take 1 tablet (75 mg total) by mouth daily. 90 tablet 3   ? desonide (DESOWEN) 0.05 % cream Apply topically 2 (two) times a day as needed.     ? digoxin (LANOXIN) 250 mcg tablet Take 0.5 tablets (125 mcg total) by mouth daily. 90 tablet 5   ? fluticasone (FLONASE) 50 mcg/actuation nasal spray Apply 2 sprays into each nostril every evening.      ? glucosamine sulfate (GLUCOSAMINE) 750 mg Tab Take 1,500 mg by mouth daily.      ? ipratropium (ATROVENT) 0.06 % nasal spray Apply 2 sprays into each nostril 2 (two) times a day.     ? melatonin 3 mg Tab Take 6 mg by mouth bedtime.      ? methylcellulose (CITRUCEL) Take 2 g by mouth daily. (1 tablespoons)     ? metoprolol tartrate (LOPRESSOR) 25 MG tablet Take 25 mg by mouth 2 (two) times a day with meals.     ? multivitamin (MULTIVITAMIN) per tablet Take 1 tablet by mouth daily.     ? nitroglycerin (NITROSTAT) 0.4 MG SL tablet Place 0.4 mg under the tongue every 5 (five) minutes as needed  for chest pain.     ? omeprazole (PRILOSEC) 20 MG capsule Take 20 mg by mouth daily.     ? sodium chloride (OCEAN) 0.65 % nasal spray Apply 2 sprays into each nostril every morning.     ? vitamin A-vitamin C-vit E-min (OCUVITE) Tab tablet Take 1 tablet by mouth daily.       No current facility-administered medications for this visit.       Allergies   Allergen Reactions   ? Amiodarone Other (See Comments)     Fatigue, shaking   ? Amiodarone Analogues Myalgia          Lab Results   Lab Results   Component Value Date     (L) 07/06/2018    K 4.1 07/06/2018     07/06/2018    CO2 24 07/06/2018    BUN 12 07/06/2018    CREATININE 0.68 (L) 07/06/2018    CALCIUM 8.5 07/06/2018     Lab Results   Component Value Date    WBC 9.3 07/05/2018    HGB 12.2 (L) 07/06/2018    HCT 37.5 (L) 07/05/2018    MCV 89 07/05/2018     07/05/2018     Lab Results   Component Value Date    CHOL 99 07/05/2018    TRIG 56 07/05/2018    HDL 33 (L) 07/05/2018    LDLCALC 55 07/05/2018     Lab Results   Component Value Date    BNP 63 05/20/2017     Lab Results   Component Value Date    CKTOTAL 42 03/24/2017    TROPONINI 0.01 05/20/2017    TROPONINI 0.01 05/20/2017    TROPONINI 0.01 03/25/2017     Lab Results   Component Value Date    TSH 3.0 01/31/2011

## 2021-06-26 NOTE — PROGRESS NOTES
Progress Notes by Renetta Crabtree MD at 4/10/2018 12:50 PM     Author: Renetta Crabtree MD Service: -- Author Type: Physician    Filed: 4/10/2018  1:37 PM Encounter Date: 4/10/2018 Status: Signed    : Renetta Crabtree MD (Physician)                  [email or web address]  [phone]         Thank you Dr. Monroy for asking the [facility] Heart Care team to participate in the care of your patient, Damian Butler.     Impression and Plan     1. Coronary artery disease. Roderick has known coronary disease, having suffered a myocardial infarction in 1997. At that time, he underwent 2-vessel bypass surgery with tiny graft to the LAD and ESPERANZA graft to the RCA.  ?  This is stable. Patient reports no chest pain or other concerning symptoms in this regard.  ?  2. History of supraventricular tachycardia.  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 Historically he has seen Dr. Efe Doan in the electrophysiology department.  This has been fairly quiescent, however, over the past 4 months or so he has noted increased episodes.  He typically will have 4-6 episodes a month.  Commonly, Valsalva type maneuver with cough and bearing down will terminate the rhythm disturbance.  Generally it lasts approximately 2 minutes.  Normally, would consider increase in metoprolol therapy, however, his heart rate is currently in the 50s and this may preclude further upward titration.    Weill obtain 1 month one patch monitor to further clarify SVT characteristics.    Will refer to Electrophysiology.  ?  3. Hypertension. Blood pressure is very fairly reasonable in the office today.  ?  4. Dyslipidemia.  Relatively recent lipid profile 16 October 2017 was at/near target with LDL 73 mg/dL and HDL 43 mg/dL.  ?  I will plan on seeing Damian again in 1 year.         History of Present Illness    Once again I would like to thank you again for  asking me to participate in the care of your patient, Damian Butler.  As you know, but to reiterate for my own records, Damian Butler is a 77 y.o. male with known coronary disease, having suffered a myocardial infarction in 1997. At that time, he underwent 2-vessel bypass surgery with tiny graft to the LAD and ESPERANZA graft to the RCA. Historically, he has had well-preserved LV systolic performance. He also has a history of having AV moy reentry tachycardia for which he underwent ablative therapy in April 2010.  ?  Since my last visit with Roderick, he has noted an increase in his frequency of SVT.  He states that over the last 3-4 months he has had 4-6 episodes monthly.  These commonly rapidly terminate with Valsalva and cough.  Generally they last about 2 minutes.  He has had some occasional lightheadedness, but no justina spells.  He commonly uses a treadmill and has not had any anginal type symptoms with this or other activity.    Further review of systems is otherwise negative/noncontributory (medical record and 13 point review of systems reviewed as well and pertinent positives noted).         Cardiac Diagnostics      Echocardiogram 26 March 2017:  1. Normal left ventricular size and systolic performance with ejection fraction of 55%.  2. Mild aortic insufficiency.  3. Normal right ventricular size and systolic performance.  4. Mild left atrial enlargement.    Echocardiogram 20 November 2009 (stress echocardiogram):  1. Normal LV size and function.  2. No significant valvular heart disease. ?  3. There was no evidence of ischemia.    Nuclear stress perfusion studies 12 November 2010 :  1. Splanchnic artifact, though there was a small-medium sized inferior and inferolateral defect consistent with small infarction and small area of ischemia.  2. Normal left ventricular systolic performance with ejection fraction of 56%. ?  3. It is felt unchanged from March of 2009.  ?  24-hour Holter monitor 19 February  "2011:  1. Frequent PVCs suggestive of inferior left ventricular origin.             Physical Examination       /68 (Patient Site: Right Arm, Patient Position: Sitting, Cuff Size: Adult Large)  Pulse (!) 54  Resp 18  Ht 5' 8\" (1.727 m)  Wt 170 lb (77.1 kg)  BMI 25.85 kg/m2        Wt Readings from Last 3 Encounters:   04/10/18 170 lb (77.1 kg)   05/24/17 170 lb (77.1 kg)   03/26/17 169 lb 12.8 oz (77 kg)     The patient is alert and oriented times three. Sclerae are anicteric. Mucosal membranes are moist. Jugular venous pressure is normal. No significant adenopathy/thyromegally appreciated. Lungs are clear with good expansion. On cardiovascular exam, the patient has a regular S1 and S2. Abdomen is soft and non-tender. Extremities reveal no clubbing, cyanosis, or edema.         Family History/Social History/Risk Factors   Patient does not smoke.  Family history of hypertension.           Medications  Allergies   Current Outpatient Prescriptions   Medication Sig Dispense Refill   ? aspirin 325 MG EC tablet Take 325 mg by mouth daily.     ? atorvastatin (LIPITOR) 80 MG tablet Take 80 mg by mouth daily.     ? fluticasone (FLONASE) 50 mcg/actuation nasal spray Apply 2 sprays into each nostril every evening.      ? glucosamine sulfate (GLUCOSAMINE) 750 mg Tab Take 1,500 mg by mouth daily.      ? ipratropium (ATROVENT) 0.06 % nasal spray Apply 2 sprays into each nostril 2 (two) times a day.     ? melatonin 3 mg Tab Take 6 mg by mouth bedtime.      ? methylcellulose (CITRUCEL) Take 2 g by mouth daily. (1 tablespoons)     ? metoprolol tartrate (LOPRESSOR) 25 MG tablet TAKE ONE TABLET BY MOUTH TWICE DAILY 180 tablet 0   ? multivitamin (MULTIVITAMIN) per tablet Take 1 tablet by mouth daily.     ? nitroglycerin (NITROSTAT) 0.4 MG SL tablet Place 0.4 mg under the tongue every 5 (five) minutes as needed for chest pain.     ? omeprazole (PRILOSEC) 20 MG capsule Take 20 mg by mouth daily.     ? sodium chloride (OCEAN) 0.65 " % nasal spray Apply 2 sprays into each nostril every morning.     ? vitamin A-vitamin C-vit E-min (OCUVITE) Tab tablet Take 1 tablet by mouth daily.     ? betamethasone dipropionate (DIPROLENE) 0.05 % cream Apply 1 application topically 2 (two) times a day as needed.     ? desonide (DESOWEN) 0.05 % cream Apply topically 2 (two) times a day as needed.     ? metoprolol tartrate (LOPRESSOR) 25 MG tablet Take 25 mg by mouth 2 (two) times a day with meals.       No current facility-administered medications for this visit.       Allergies   Allergen Reactions   ? Amiodarone Other (See Comments)     Fatigue, shaking   ? Amiodarone Analogues Myalgia          Lab Results   Lab Results   Component Value Date     10/16/2017    K 5.1 (H) 10/16/2017     10/16/2017    CO2 27 10/16/2017    BUN 16 10/16/2017    CREATININE 0.84 10/16/2017    CALCIUM 9.1 10/16/2017     Lab Results   Component Value Date    WBC 10.3 05/20/2017    HGB 13.9 (L) 05/20/2017    HCT 42.2 05/20/2017    MCV 92 05/20/2017     05/20/2017     Lab Results   Component Value Date    CHOL 125 10/16/2017    TRIG 43 10/16/2017    HDL 43 10/16/2017    LDLCALC 73 10/16/2017     Lab Results   Component Value Date    BNP 63 05/20/2017     Lab Results   Component Value Date    CKTOTAL 42 03/24/2017    TROPONINI 0.01 05/20/2017    TROPONINI 0.01 05/20/2017    TROPONINI 0.01 03/25/2017     Lab Results   Component Value Date    TSH 3.0 01/31/2011

## 2021-06-27 NOTE — PROGRESS NOTES
Progress Notes by Steve Leyva MD at 7/8/2019 10:10 AM     Author: Steve Leyva MD Service: -- Author Type: Physician    Filed: 7/8/2019 10:31 AM Encounter Date: 7/8/2019 Status: Signed    : Steve Leyva MD (Physician)           Click to link to [facility] Heart [facility] HEART [facility] ELECTROPHYSIOLOGY NOTE    Today I had the opportunity to see  Damian Butler for follow-up evaluation of supraventricular tachycardia.      Assessment/Recommendations   Clinic Problem List:  1. SVT (supraventricular tachycardia) (H)     2. Coronary artery disease involving native coronary artery of native heart without angina pectoris     3. 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  No clinical symptoms to suggest recurrence and chance of late recurrence at this point is minimal  Hypertension controlled,  home blood pressures running approximately 118/70  Coronary artery disease asymptomatic, 1 year after stent placement so Plavix can be discontinued    Plan:  Stop taking Plavix  Continue aspirin 1tablet/day as well as your current medications       History of Present Illness     Damian Butler is a 78 y.o. male with history of supraventricular tachycardia and coronary artery disease.  He had ablation for AV moy reentry tachycardia in 2010.  He also has a history of PVCs.  He has a history of coronary artery disease with two-vessel coronary artery bypass graft surgery in 1997 and 3 stents to the right coronary artery in July 2018 via ESPERANZA.  He was having tachypalpitations 3-4 times per month terminated with Valsalva maneuver.  SVT rates were approximately 110 bpm is noted on a CHERELLE April 2018.  He was found to have typical AV moy reentry at repeat EP studies 5/7/2019.  He had slow pathway ablation first with cryoablation and then with radiofrequency ablation  In the slow pathway zone.   Damian has done  well since the ablation.  He notes rare skips but no tachypalpitations.  Tachypalpitations had been present several times per month immediately prior to the ablation.  He denies exertional chest pain or pressure.  He states home blood pressures have been running approximately 118/70.         Physical Examination Review of Systems   Vitals:    07/08/19 1010   BP: 132/60   Pulse: (!) 56   Resp: 8     Body mass index is 24.67 kg/m .  Wt Readings from Last 3 Encounters:   07/08/19 167 lb 1 oz (75.8 kg)   05/07/19 165 lb 11.2 oz (75.2 kg)   04/10/19 167 lb (75.8 kg)        Appearance:   no distress, older gentleman   HEENT:   no scleral icterus, normal conjunctivae    Neck: no carotid bruits or thyromegaly   Chest/Lungs:   lungs are clear to auscultation, no rales or wheezing,    Cardiovascular:   Jugular venous pressure 4 cm, Apical pulse is quite regular at 60 bpm. Normal S1,S2 with grade 2/6 systolic ejection murmur,   Abdomen:  no  Hepatosplenomegaly., nontender,  bowel sounds are present   Extremities: no cyanosis or clubbing, No edema   Skin: no xanthelasma, warm.    Neurologic: No gross focal neurologic deficits   Mood/Affect: Alert, cooperative    General: WNL  Eyes: WNL  Ears/Nose/Throat: WNL  Lungs: WNL  Heart: Irregular Heartbeat  Stomach: WNL  Bladder: WNL  Muscle/Joints: WNL  Skin: WNL  Nervous System: WNL  Mental Health: WNL     Blood: Easy Bruising     Medical History  Surgical History Family History Social History   Past Medical History:   Diagnosis Date   ? Anxiety    ? Arrhythmia     pvc and vt   ? Basal cell carcinoma     chin   ? COPD (chronic obstructive pulmonary disease) (H)    ? Coronary artery disease    ? GERD (gastroesophageal reflux disease)    ? Herniated disc    ? Hyperlipidemia    ? Hypertension    ? Macular degeneration    ? Myocardial infarction (H) 1997   ? PVC (premature ventricular contraction)    ? Rhinitis    ? SVT (supraventricular tachycardia) (H)    ? Syncope     Past Surgical  History:   Procedure Laterality Date   ? BACK SURGERY     ? CARDIAC ELECTROPHYSIOLOGY MAPPING AND ABLATION     ? CORONARY ARTERY BYPASS GRAFT      Comments: X 2 LIMA to LAD ESPERANZA to RCA   ? CV CORONARY ANGIOGRAM N/A 2018    Procedure: Coronary Angiogram;  Surgeon: Carito Flannery MD;  Location: [facility] Cath Lab;  Service:    ? CV CORONARY ANGIOGRAM N/A 2018    Procedure: Coronary Angiogram;  Surgeon: Carito Flannery MD;  Location: [facility];  Service:    ? CV LEFT HEART CATHETERIZATION WO LEFT VETRICULOGRAM Left 2018    Procedure: Left Heart Catheterization Without Left Ventriculogram;  Surgeon: Carito Flannery MD;  Location: [facility] Cath Lab;  Service:    ? EP ABLATION SVT N/A 2019    Procedure: EP Ablation Supra Ventricular;  Surgeon: Steve Leyva MD;  Location: [facility] Cath Lab;  Service: Cardiology   ? HAND SURGERY     ? HERNIA REPAIR     ? MOHS SURGERY     ? OK ABLATE HEART DYSRHYTHM FOCUS      Description: Catheter Ablation Atrial Supraventricular Tachycardia;  Proc Date: 2010;  Comments: AVNRT  cryoablation   ? OK REPAIR EPIGASTRIC HERNIA,REDUC N/A 2014    Procedure: EPIGASTRIC INCISIONAL HERNIA REPAIR ;  Surgeon: Daniel Gutierrez MD;  Location: [facility];  Service: General   ? TONSILLECTOMY      Family History   Problem Relation Age of Onset   ? Hypertension Father     Social History     Socioeconomic History   ? Marital status:      Spouse name: Not on file   ? Number of children: Not on file   ? Years of education: Not on file   ? Highest education level: Not on file   Occupational History   ? Not on file   Social Needs   ? Financial resource strain: Not on file   ? Food insecurity:     Worry: Not on file     Inability: Not on file   ? Transportation needs:     Medical: Not on file     Non-medical: Not on file   Tobacco Use   ? Smoking status: Former Smoker     Last attempt to quit: 1973     Years since quittin.5   ?  Smokeless tobacco: Never Used   Substance and Sexual Activity   ? Alcohol use: Yes     Alcohol/week: 0.6 oz     Types: 1 Glasses of wine per week     Comment: daily with dinner   ? Drug use: No   ? Sexual activity: Not on file   Lifestyle   ? Physical activity:     Days per week: Not on file     Minutes per session: Not on file   ? Stress: Not on file   Relationships   ? Social connections:     Talks on phone: Not on file     Gets together: Not on file     Attends Mandaen service: Not on file     Active member of club or organization: Not on file     Attends meetings of clubs or organizations: Not on file     Relationship status: Not on file   ? Intimate partner violence:     Fear of current or ex partner: Not on file     Emotionally abused: Not on file     Physically abused: Not on file     Forced sexual activity: Not on file   Other Topics Concern   ? Not on file   Social History Narrative   ? Not on file          Medications  Allergies   Current Outpatient Medications   Medication Sig Dispense Refill   ? aspirin 81 MG EC tablet Take 1 tablet (81 mg total) by mouth daily.     ? atorvastatin (LIPITOR) 80 MG tablet Take 80 mg by mouth daily.     ? betamethasone dipropionate (DIPROLENE) 0.05 % cream Apply 1 application topically 2 (two) times a day as needed.     ? desonide (DESOWEN) 0.05 % cream Apply topically 2 (two) times a day as needed.     ? fluticasone (FLONASE) 50 mcg/actuation nasal spray Apply 2 sprays into each nostril every evening.      ? glucosamine sulfate (GLUCOSAMINE) 750 mg Tab Take 1,500 mg by mouth daily.      ? ipratropium (ATROVENT) 0.06 % nasal spray Apply 2 sprays into each nostril 2 (two) times a day.     ? melatonin 3 mg Tab Take 6 mg by mouth bedtime.      ? methylcellulose (CITRUCEL) Take 2 g by mouth daily. (1 tablespoons)     ? metoprolol tartrate (LOPRESSOR) 25 MG tablet TAKE ONE TABLET BY MOUTH TWICE DAILY 180 tablet 2   ? multivitamin (MULTIVITAMIN) per tablet Take 1 tablet by  mouth daily.     ? omeprazole (PRILOSEC) 20 MG capsule Take 20 mg by mouth daily.     ? sodium chloride (OCEAN) 0.65 % nasal spray Apply 2 sprays into each nostril every morning.     ? vitamin A-vitamin C-vit E-min (OCUVITE) Tab tablet Take 1 tablet by mouth daily.     ? nitroglycerin (NITROSTAT) 0.4 MG SL tablet Place 0.4 mg under the tongue every 5 (five) minutes as needed for chest pain.       No current facility-administered medications for this visit.       Allergies   Allergen Reactions   ? Amiodarone Other (See Comments)     Fatigue, shaking   ? Amiodarone Analogues Myalgia

## 2021-06-27 NOTE — PROGRESS NOTES
Progress Notes by Era Mcmahon RN at 4/11/2019  4:51 PM     Author: Era Mcmahon RN Service: -- Author Type: Registered Nurse    Filed: 4/11/2019  4:55 PM Encounter Date: 4/11/2019 Status: Signed    : Era Mcmahon RN (Registered Nurse)       Called patient who agreed to move forward with the procedure.  Will prep chart and send to scheduling.    Steve Kimbrough MD Holen, Megan L RN             Chart reviewed, CHERELLE last year was consistent with SVT probable AVNRT with symptoms of palpitations.  History of AVNRT cryoablation in 2010 noted.  Okay to proceed with EP studies and ablation.  FADI, no anesthesia.  Elective scheduling.    Previous Messages      ----- Message -----   From: Era Mcmahon RN   Sent: 4/11/2019   8:22 AM   To: Steve Leyva MD       ----- Message -----   From: Efe Doan MD   Sent: 4/10/2019   5:42 PM   To: [facility] Ep Rn Support Pool     I feel patient is a candidate for comprehensive electrophysiologic study with ablation   See of Dr. Leyva would be agreeable to perform procedure   Would need to stop his diltiazem and beta-blocker prior to the procedure-check with Dr. Leyva

## 2021-06-29 NOTE — PROGRESS NOTES
"Progress Notes by Renetta Crabtree MD at 7/8/2020 12:50 PM     Author: Renetta Crabtree MD Service: -- Author Type: Physician    Filed: 7/8/2020 12:57 PM Encounter Date: 7/8/2020 Status: Signed    : Renetta Crabtree MD (Physician)          The patient has been notified of following:     \"This telephone visit will be conducted via a call between you and your physician/provider. We have found that certain health care needs can be provided without the need for a physical exam.  This service lets us provide the care you need with a phone conversation.  If a prescription is necessary we can send it directly to your pharmacy.  If lab work is needed we can place an order for that and you can then stop by our lab to have the test done at a later time. If during the course of the call the physician/provider feels a telephone visit is not appropriate, you will not be charged for this service.\" Verbal consent has been obtained for this service by care team member:         HEART CARE PHONE ENCOUNTER     The patient has chosen to have the visit conducted as a telephone visit, to reduce risk of exposure given the current status of Coronavirus in our community. This telephone visit is being conducted via a call between the patient and physician/provider. Health care needs are being provided without a physical exam.      Impression and Plan     1. Coronary artery disease. Roderick has known coronary disease, having suffered a myocardial infarction in 1997. At that time, he underwent 2-vessel bypass surgery with tiny graft to the LAD and ESPERANZA graft to the RCA.     More recently, patient underwent repeat angiography and on 5 July 2018 had successful PCI of distal right coronary artery via ESPERANZA graft with placement of 3 drug-eluting stents (3.5×28 mm, 3.5×24 mm, and 3.0×12 mm Synergy drug-eluting stents).      This is stable. Patient reports no chest pain or other concerning symptoms in this regard.     2. " History of supraventricular tachycardia.  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  Patient underwent repeat ablation 7 May 2019.  He states he has had no subjective recurrence since his last procedure.  He is very pleased with how he is performing in this regard.     3. Hypertension.  Blood pressure on his home blood pressure monitoring device has been very reasonable per patient.     4. Dyslipidemia.  Lipid profile 12 December 2019 was favorable with LDL 57 mg/dL and HDL 38 mg/dL.     I will plan on seeing Damian again in 1 year.      Total time of call between patient and provider was 11 minutes     Start Time: 1247    Stop Time: 1258         History of Present Illness    Damian Butler is a 79 y.o. male who is being evaluated via a billable telephone visit.     Once again I would like to thank you again for asking me to participate in the care of your patient, Damian Butler.  As you know, but to reiterate for my own records, Damian Butler is a 79 y.o. male with known coronary disease, having suffered a myocardial infarction in 1997. At that time, he underwent 2-vessel bypass surgery with tiny graft to the LAD and ESPERANZA graft to the RCA. Historically, he has had well-preserved LV systolic performance.      More recently, patient underwent repeat angiography and on 5 July 2018 had successful PCI of distal right coronary artery via ESPERANZA graft with placement of 3 drug-eluting stents (3.5×28 mm, 3.5×24 mm, and 3.0×12 mm Synergy drug-eluting stents).     He also has a history of having AV moy reentry tachycardia for which he underwent ablative therapy in April 2010.  Patient underwent repeat ablation for SVT 7 May 2019.     In follow-up today, Damian states that he has been doing very well from a cardiac standpoint.  He denies chest pain or shortness of breath.  Exercise tolerance is stable.  He denies any subjective recurrence of SVT  after his last ablation.  He denies any fevers, chills, or other constitutional symptoms.    Further review of systems is otherwise negative/noncontributory (medical record and 13 point review of systems reviewed as well and pertinent positives noted).         Cardiac Diagnostics   Echocardiogram 26 March 2017:  1. Normal left ventricular size and systolic performance with ejection fraction of 55%.  2. Mild aortic insufficiency.  3. Normal right ventricular size and systolic performance.  4. Mild left atrial enlargement.    Echocardiogram 20 November 2009 (stress echocardiogram):  1. Normal LV size and function.  2. No significant valvular heart disease. ?  3. There was no evidence of ischemia.    Nuclear perfusion imaging study 22 June 2018 (pre-coronary angiogram):  1. Medium-sized area of mixed ischemia and nontransmural infarction in the inferior and inferolateral segments.  2. Normal left ventricular systolic performance with ejection fraction of 66%.    Nuclear stress perfusion studies 12 November 2010 :  1. Splanchnic artifact, though there was a small-medium sized inferior and inferolateral defect consistent with small infarction and small area of ischemia.  2. Normal left ventricular systolic performance with ejection fraction of 56%. ?  3. It is felt unchanged from March of 2009.    Coronary angiography 5 July 2018:  1. Successful PCI of distal right coronary artery via ESPERANZA graft with placement of 3 drug-eluting stents (3.5×28 mm, 3.5×24 mm, and 3.0×12 mm Synergy drug-eluting stents).    Coronary angiography 29 June 2018:  1. Left Main coronary artery: 10% stenosis.  2. Left anterior descending coronary artery: Proximal-mid 100% stenosis.  First a 50% stenosis.  3. Circumflex coronary artery: Moderate size vessel and angiographically normal.  4. Right coronary artery: 100% mid stenosis with distal 80% stenosis.  5. ESPERANZA graft to mid right coronary artery is large and angiographically normal.  6. LIMA  graft to mid LAD is large and angiographically normal.    One-patch monitor 16 April 2018 through 16 May 2018:  1. The palpitations and rapid heart beating associated with supraventricular tachycardia, probably AV moy reentry rate 115 beats per minute on 04/29/2018 and 05/01/2018.    2. On other occasions palpitations were associated with ventricular premature beats or ventricular couplet or with sinus rhythm or sinus tachycardia without ectopy.    24-hour Holter monitor 19 February 2011:  Frequent PVCs suggestive of inferior left ventricular origin.         Physical Examination           Wt Readings from Last 3 Encounters:   07/08/19 167 lb 1 oz (75.8 kg)   05/07/19 165 lb 11.2 oz (75.2 kg)   04/10/19 167 lb (75.8 kg)     The patient has chosen to have the visit conducted as a telephone visit, to reduce risk of exposure given the current status of Coronavirus in our community. This telephone visit is being conducted via a call between the patient and physician/provider. Health care needs are being provided without a physical exam.        Family History/Social History/Risk Factors   Patient does not smoke.  Family history reviewed, and family history includes Hypertension in his father.        Medications  Allergies   Current Outpatient Medications   Medication Sig Dispense Refill   ? aspirin 81 MG EC tablet Take 1 tablet (81 mg total) by mouth daily.     ? atorvastatin (LIPITOR) 80 MG tablet Take 80 mg by mouth daily.     ? betamethasone dipropionate (DIPROLENE) 0.05 % cream Apply 1 application topically 2 (two) times a day as needed.     ? desonide (DESOWEN) 0.05 % cream Apply topically 2 (two) times a day as needed.     ? fluticasone (FLONASE) 50 mcg/actuation nasal spray Apply 2 sprays into each nostril every evening.      ? glucosamine sulfate (GLUCOSAMINE) 750 mg Tab Take 1,500 mg by mouth daily.      ? ipratropium (ATROVENT) 0.06 % nasal spray Apply 2 sprays into each nostril 2 (two) times a day.     ?  melatonin 3 mg Tab Take 6 mg by mouth bedtime.      ? methylcellulose (CITRUCEL) Take 2 g by mouth daily. (1 tablespoons)     ? metoprolol tartrate (LOPRESSOR) 25 MG tablet TAKE ONE TABLET BY MOUTH TWICE DAILY 180 tablet 1   ? multivitamin (MULTIVITAMIN) per tablet Take 1 tablet by mouth daily.     ? nitroglycerin (NITROSTAT) 0.4 MG SL tablet Place 0.4 mg under the tongue every 5 (five) minutes as needed for chest pain.     ? omeprazole (PRILOSEC) 20 MG capsule Take 20 mg by mouth daily.     ? sodium chloride (OCEAN) 0.65 % nasal spray Apply 2 sprays into each nostril every morning.     ? vitamin A-vitamin C-vit E-min (OCUVITE) Tab tablet Take 1 tablet by mouth daily.       No current facility-administered medications for this visit.       Allergies   Allergen Reactions   ? Amiodarone Other (See Comments)     Fatigue, shaking   ? Amiodarone Analogues Myalgia          Lab Results   Lab Results   Component Value Date     (L) 06/16/2020    K 4.9 06/16/2020     06/16/2020    CO2 28 06/16/2020    BUN 17 06/16/2020    CREATININE 0.89 06/16/2020    CALCIUM 9.0 06/16/2020     Lab Results   Component Value Date    WBC 8.3 05/07/2019    HGB 14.2 05/07/2019    HCT 42.9 05/07/2019    MCV 94 05/07/2019     05/07/2019     Lab Results   Component Value Date    CHOL 105 12/12/2019    TRIG 50 12/12/2019    HDL 38 (L) 12/12/2019    LDLCALC 57 12/12/2019     Lab Results   Component Value Date    BNP 63 05/20/2017     Lab Results   Component Value Date    CKTOTAL 42 03/24/2017    TROPONINI 0.01 05/20/2017    TROPONINI 0.01 05/20/2017    TROPONINI 0.01 03/25/2017     Lab Results   Component Value Date    TSH 3.0 01/31/2011           Medical History  Surgical History   Past Medical History:   Diagnosis Date   ? Anxiety    ? Arrhythmia     pvc and vt   ? Basal cell carcinoma     chin   ? COPD (chronic obstructive pulmonary disease) (H)    ? Coronary artery disease    ? GERD (gastroesophageal reflux disease)    ?  Herniated disc    ? Hyperlipidemia    ? Hypertension    ? Macular degeneration    ? Myocardial infarction (H) 1997   ? PVC (premature ventricular contraction)    ? Rhinitis    ? SVT (supraventricular tachycardia) (H)    ? Syncope       Past Surgical History:   Procedure Laterality Date   ? ABLATION OF DYSRHYTHMIC FOCUS  05/07/2019    Typical AV moy reentry   ? BACK SURGERY     ? CARDIAC ELECTROPHYSIOLOGY MAPPING AND ABLATION  2010   ? CORONARY ARTERY BYPASS GRAFT  1997    Comments: X 2 LIMA to LAD ESPERANZA to RCA   ? CV CORONARY ANGIOGRAM N/A 6/29/2018    Procedure: Coronary Angiogram;  Surgeon: Carito Flannery MD;  Location: [facility] Lab;  Service:    ? CV CORONARY ANGIOGRAM N/A 7/5/2018    Procedure: Coronary Angiogram;  Surgeon: Carito Flannery MD;  Location: [facility];  Service:    ? CV LEFT HEART CATHETERIZATION WO LEFT VETRICULOGRAM Left 6/29/2018    Procedure: Left Heart Catheterization Without Left Ventriculogram;  Surgeon: Carito Flannery MD;  Location: [facility];  Service:    ? EP ABLATION SVT N/A 5/7/2019    Procedure: EP Ablation Supra Ventricular;  Surgeon: Steve Leyva MD;  Location: [facility];  Service: Cardiology   ? HAND SURGERY     ? HERNIA REPAIR     ? MOHS SURGERY     ? LA ABLATE HEART DYSRHYTHM FOCUS      Description: Catheter Ablation Atrial Supraventricular Tachycardia;  Proc Date: 04/20/2010;  Comments: AVNRT  cryoablation   ? LA REPAIR EPIGASTRIC HERNIA,REDUC N/A 7/2/2014    Procedure: EPIGASTRIC INCISIONAL HERNIA REPAIR ;  Surgeon: Daniel Gutierrez MD;  Location: [facility];  Service: General   ? TONSILLECTOMY

## 2021-07-29 ENCOUNTER — OFFICE VISIT (OUTPATIENT)
Dept: CARDIOLOGY | Facility: CLINIC | Age: 81
End: 2021-07-29
Payer: MEDICARE

## 2021-07-29 VITALS
SYSTOLIC BLOOD PRESSURE: 118 MMHG | HEART RATE: 62 BPM | BODY MASS INDEX: 24.81 KG/M2 | WEIGHT: 168 LBS | DIASTOLIC BLOOD PRESSURE: 58 MMHG | RESPIRATION RATE: 16 BRPM | OXYGEN SATURATION: 96 %

## 2021-07-29 DIAGNOSIS — E78.5 DYSLIPIDEMIA: ICD-10-CM

## 2021-07-29 DIAGNOSIS — Q23.1 CONGENITAL INSUFFICIENCY OF AORTIC VALVE: Primary | ICD-10-CM

## 2021-07-29 DIAGNOSIS — I35.1 NONRHEUMATIC AORTIC VALVE INSUFFICIENCY: ICD-10-CM

## 2021-07-29 DIAGNOSIS — I25.10 CORONARY ARTERY DISEASE INVOLVING NATIVE CORONARY ARTERY OF NATIVE HEART WITHOUT ANGINA PECTORIS: ICD-10-CM

## 2021-07-29 DIAGNOSIS — I10 HYPERTENSION, UNSPECIFIED TYPE: ICD-10-CM

## 2021-07-29 PROCEDURE — 99214 OFFICE O/P EST MOD 30 MIN: CPT | Performed by: INTERNAL MEDICINE

## 2021-07-29 NOTE — PROGRESS NOTES
[facility] [address], [place] [address]   [email or web address]   OFFICE: [phone]          Thank you Dada Ricks for asking the [facility] team to participate in the care of your patient, Damian Butler.     Impression and Plan     1. Coronary artery disease. Roderick has known coronary disease, having suffered a myocardial infarction in 1997. At that time, he underwent 2-vessel bypass surgery with tiny graft to the LAD and ESPERANZA graft to the RCA.     More recently, patient underwent repeat angiography and on 5 July 2018 had successful PCI of distal right coronary artery via ESPERANZA graft with placement of 3 drug-eluting stents (3.5×28 mm, 3.5×24 mm, and 3.0×12 mm Synergy drug-eluting stents).      This is stable. Patient reports no chest pain or other concerning symptoms in this regard.     2. History of supraventricular tachycardia.  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  Patient underwent repeat ablation 7 May 2019.  He states he has had no subjective recurrence since his last procedure.  He is very pleased with how he is performing in this regard.     3.  Aortic insufficiency.  This was felt mild in degree on echocardiogram 4 years ago.  Significant progression is not suggested.  Somewhat of an increase his murmur, however.  Plan:    Echocardiogram.    4.  Hypertension.  Blood pressure is very reasonable in the office today at 118/58 mmHg.     5. Dyslipidemia.  Lipid profile 14 June 2021 was at/near target with LDL 65 mg/dL and HDL 39 mg/dL.     Tentatively will plan on seeing Damian again in 1 year.        30 minutes spent reviewing prior records (including documentation, laboratory studies, cardiac testing/imaging), interview with patient along with physical exam, planning, and subsequent documentation/crafting of note.           History of Present  Illness    Once again I would like to thank you again for asking me to participate in the care of your patient, Damian Butler.  As you know, but to reiterate for my own records, Damian Butler is a 80 year old male with known coronary disease, having suffered a myocardial infarction in 1997. At that time, he underwent 2-vessel bypass surgery with tiny graft to the LAD and ESPERANZA graft to the RCA. Historically, he has had well-preserved LV systolic performance.      More recently, patient underwent repeat angiography and on 5 July 2018 had successful PCI of distal right coronary artery via ESPERANZA graft with placement of 3 drug-eluting stents (3.5×28 mm, 3.5×24 mm, and 3.0×12 mm Synergy drug-eluting stents).     He also has a history of having AV moy reentry tachycardia for which he underwent ablative therapy in April 2010.  Patient underwent repeat ablation for SVT 7 May 2019.     In follow-up today, Damian states that he has been doing very well from a cardiac standpoint.  He denies chest pain or shortness of breath.  Exercise tolerance is stable.  He denies any subjective recurrence of SVT after his last ablation.  He denies any fevers, chills, or other constitutional symptoms.    Further review of systems is otherwise negative/noncontributory (medical record and 13 point review of systems reviewed as well and pertinent positives noted).         Cardiac Diagnostics      Echocardiogram 26 March 2017:  1. Normal left ventricular size and systolic performance with ejection fraction of 55%.  2. Mild aortic insufficiency.  3. Normal right ventricular size and systolic performance.  4. Mild left atrial enlargement.    Echocardiogram 20 November 2009 (stress echocardiogram):  1. Normal LV size and function.  2. No significant valvular heart disease. ?  3. There was no evidence of ischemia.    Nuclear perfusion imaging study 22 June 2018 (pre-coronary angiogram):  1. Medium-sized area of mixed ischemia and nontransmural  infarction in the inferior and inferolateral segments.  2. Normal left ventricular systolic performance with ejection fraction of 66%.    Nuclear stress perfusion studies 12 November 2010 :  1. Splanchnic artifact, though there was a small-medium sized inferior and inferolateral defect consistent with small infarction and small area of ischemia.  2. Normal left ventricular systolic performance with ejection fraction of 56%. ?  3. It is felt unchanged from March of 2009.    Coronary angiography 5 July 2018:  1. Successful PCI of distal right coronary artery via ESPERANZA graft with placement of 3 drug-eluting stents (3.5×28 mm, 3.5×24 mm, and 3.0×12 mm Synergy drug-eluting stents).    Coronary angiography 29 June 2018:  1. Left Main coronary artery: 10% stenosis.  2. Left anterior descending coronary artery: Proximal-mid 100% stenosis.  First a 50% stenosis.  3. Circumflex coronary artery: Moderate size vessel and angiographically normal.  4. Right coronary artery: 100% mid stenosis with distal 80% stenosis.  5. ESPERANZA graft to mid right coronary artery is large and angiographically normal.  6. LIMA graft to mid LAD is large and angiographically normal.    One-patch monitor 16 April 2018 through 16 May 2018:  1. The palpitations and rapid heart beating associated with supraventricular tachycardia, probably AV moy reentry rate 115 beats per minute on 04/29/2018 and 05/01/2018.    2. On other occasions palpitations were associated with ventricular premature beats or ventricular couplet or with sinus rhythm or sinus tachycardia without ectopy.    24-hour Holter monitor 19 February 2011:  1. Frequent PVCs suggestive of inferior left ventricular origin.           Physical Examination       /58 (BP Location: Left arm, Patient Position: Sitting, Cuff Size: Adult Regular)   Pulse 62   Resp 16   Wt 76.2 kg (168 lb)   SpO2 96%   BMI 24.81 kg/m          Wt Readings from Last 3 Encounters:   07/29/21 76.2 kg (168 lb)    07/08/19 75.8 kg (167 lb 1 oz)   05/07/19 75.2 kg (165 lb 11.2 oz)     The patient is alert and oriented times three. Sclerae are anicteric. Mucosal membranes are moist. Jugular venous pressure is normal. No significant adenopathy/thyromegally appreciated. Lungs are clear with good expansion. On cardiovascular exam, the patient has a regular S1 and S2.  2/6 systolic murmur heard at right sternal border.  Abdomen is soft and non-tender. Extremities reveal no clubbing, cyanosis, or edema.         Medications  Allergies   Current Outpatient Medications   Medication Sig Dispense Refill     aspirin 81 MG EC tablet [ASPIRIN 81 MG EC TABLET] Take 1 tablet (81 mg total) by mouth daily.       atorvastatin (LIPITOR) 80 MG tablet [ATORVASTATIN (LIPITOR) 80 MG TABLET] Take 80 mg by mouth daily.       betamethasone dipropionate (DIPROLENE) 0.05 % cream [BETAMETHASONE DIPROPIONATE (DIPROLENE) 0.05 % CREAM] Apply 1 application topically 2 (two) times a day as needed.       desonide (DESOWEN) 0.05 % cream [DESONIDE (DESOWEN) 0.05 % CREAM] Apply topically 2 (two) times a day as needed.       fluticasone (FLONASE) 50 mcg/actuation nasal spray [FLUTICASONE (FLONASE) 50 MCG/ACTUATION NASAL SPRAY] Apply 2 sprays into each nostril every evening.        glucosamine sulfate (GLUCOSAMINE) 750 mg Tab [GLUCOSAMINE SULFATE (GLUCOSAMINE) 750 MG TAB] Take 1,500 mg by mouth daily.        ipratropium (ATROVENT) 0.06 % nasal spray [IPRATROPIUM (ATROVENT) 0.06 % NASAL SPRAY] Apply 2 sprays into each nostril 2 (two) times a day.       melatonin 3 mg Tab [MELATONIN 3 MG TAB] Take 6 mg by mouth bedtime.        methylcellulose (CITRUCEL) Take 2 g by mouth every other day        metoprolol tartrate (LOPRESSOR) 25 MG tablet [METOPROLOL TARTRATE (LOPRESSOR) 25 MG TABLET] TAKE 1 TABLET BY MOUTH TWICE A  tablet 1     multivitamin (MULTIVITAMIN) per tablet [MULTIVITAMIN (MULTIVITAMIN) PER TABLET] Take 1 tablet by mouth daily.       nitroglycerin  (NITROSTAT) 0.4 MG SL tablet [NITROGLYCERIN (NITROSTAT) 0.4 MG SL TABLET] Place 0.4 mg under the tongue every 5 (five) minutes as needed for chest pain.       omeprazole (PRILOSEC) 20 MG capsule [OMEPRAZOLE (PRILOSEC) 20 MG CAPSULE] Take 20 mg by mouth daily.       sodium chloride (OCEAN) 0.65 % nasal spray Spray 2 sprays into both nostrils 2 times daily        vitamin A-vitamin C-vit E-min (OCUVITE) Tab tablet [VITAMIN A-VITAMIN C-VIT E-MIN (OCUVITE) TAB TABLET] Take 1 tablet by mouth daily.         Allergies   Allergen Reactions     Amiodarone Other (See Comments)     Fatigue, shaking     Amiodarone Analogues [Amiodarone] Muscle Pain (Myalgia)          Lab Results    Chemistry/lipid CBC Cardiac Enzymes/BNP/TSH/INR   Recent Labs   Lab Test 06/14/21  0912   CHOL 114   HDL 39*   LDL 65   TRIG 52     Recent Labs   Lab Test 06/14/21  0912 12/14/20  0850 12/12/19  0830   LDL 65 64 57     Recent Labs   Lab Test 06/14/21  0912      POTASSIUM 5.0   CHLORIDE 102   CO2 25   GLC 83   BUN 18   CR 0.86   GFRESTIMATED >60   CHICHO 8.3*     Recent Labs   Lab Test 06/14/21  0912 12/14/20  0850 06/16/20  1158   CR 0.86 0.83 0.89     No results for input(s): A1C in the last 97562 hours.       Recent Labs   Lab Test 05/07/19  0614   WBC 8.3   HGB 14.2   HCT 42.9   MCV 94        Recent Labs   Lab Test 05/07/19  0614 07/06/18  0554 07/05/18  0714   HGB 14.2 12.2* 12.7*    No results for input(s): TROPONINI in the last 97125 hours.  No results for input(s): BNP, NTBNPI, NTBNP in the last 30750 hours.  No results for input(s): TSH in the last 58058 hours.  No results for input(s): INR in the last 10802 hours.     Medical History  Surgical History Family History Social History   No past medical history on file.  Past Surgical History:   Procedure Laterality Date     ABLATION OF DYSRHYTHMIC FOCUS  05/07/2019    Typical AV moy reentry     BACK SURGERY       BYPASS GRAFT ARTERY CORONARY  1997    Comments: X 2 LIMA to LAD ESPERANZA to  RCA     CARDIAC ELECTROPHYSIOLOGY MAPPING AND ABLATION       CV CORONARY ANGIOGRAM N/A 2018    Procedure: Coronary Angiogram;  Surgeon: Carito Flannery MD;  Location: [facility] Lab;  Service:      CV CORONARY ANGIOGRAM N/A 2018    Procedure: Coronary Angiogram;  Surgeon: Carito Flannery MD;  Location: [facility] Cath Lab;  Service:      CV LEFT HEART CATHETERIZATION WITHOUT LEFT VENTRICULOGRAM Left 2018    Procedure: Left Heart Catheterization Without Left Ventriculogram;  Surgeon: Carito Flannery MD;  Location: [facility] Lab;  Service:      EP ABLATION SVT N/A 2019    Procedure: EP Ablation Supra Ventricular;  Surgeon: Steve Leyva MD;  Location: [facility] Lab;  Service: Cardiology     HAND SURGERY       HC REPAIR EPIGASTRIC HERNIA,REDUC N/A 2014    Procedure: EPIGASTRIC INCISIONAL HERNIA REPAIR ;  Surgeon: Daniel Gutierrez MD;  Location: [facility];  Service: General     HERNIA REPAIR       MOHS MICROGRAPHIC PROCEDURE       HI ABLATE HEART DYSRHYTHM FOCUS      Description: Catheter Ablation Atrial Supraventricular Tachycardia;  Proc Date: 2010;  Comments: AVNRT  cryoablation     TONSILLECTOMY       Family History   Problem Relation Age of Onset     Hypertension Father         Social History     Socioeconomic History     Marital status:      Spouse name: Not on file     Number of children: Not on file     Years of education: Not on file     Highest education level: Not on file   Occupational History     Not on file   Tobacco Use     Smoking status: Former Smoker     Quit date: 1973     Years since quittin.6     Smokeless tobacco: Never Used   Substance and Sexual Activity     Alcohol use: Yes     Alcohol/week: 1.0 standard drinks     Comment: Alcoholic Drinks/day: daily with dinner     Drug use: No     Sexual activity: Not on file   Other Topics Concern     Not on file   Social History Narrative     Not on file     Social  Determinants of Health     Financial Resource Strain:      Difficulty of Paying Living Expenses:    Food Insecurity:      Worried About Running Out of Food in the Last Year:      Ran Out of Food in the Last Year:    Transportation Needs:      Lack of Transportation (Medical):      Lack of Transportation (Non-Medical):    Physical Activity:      Days of Exercise per Week:      Minutes of Exercise per Session:    Stress:      Feeling of Stress :    Social Connections:      Frequency of Communication with Friends and Family:      Frequency of Social Gatherings with Friends and Family:      Attends Baptism Services:      Active Member of Clubs or Organizations:      Attends Club or Organization Meetings:      Marital Status:    Intimate Partner Violence:      Fear of Current or Ex-Partner:      Emotionally Abused:      Physically Abused:      Sexually Abused:

## 2021-08-03 ENCOUNTER — HOSPITAL ENCOUNTER (OUTPATIENT)
Dept: CARDIOLOGY | Facility: CLINIC | Age: 81
Discharge: HOME | End: 2021-08-03
Attending: INTERNAL MEDICINE | Admitting: INTERNAL MEDICINE
Payer: MEDICARE

## 2021-08-03 DIAGNOSIS — I35.1 NONRHEUMATIC AORTIC VALVE INSUFFICIENCY: ICD-10-CM

## 2021-08-03 LAB — LVEF ECHO: NORMAL

## 2021-08-03 PROCEDURE — 93306 TTE W/DOPPLER COMPLETE: CPT | Mod: 26 | Performed by: INTERNAL MEDICINE

## 2021-08-03 PROCEDURE — 93306 TTE W/DOPPLER COMPLETE: CPT

## 2021-08-13 ENCOUNTER — TELEPHONE (OUTPATIENT)
Dept: CARDIOLOGY | Facility: CLINIC | Age: 81
End: 2021-08-13

## 2021-08-13 NOTE — TELEPHONE ENCOUNTER
----- Message from Jelly Zee sent at 8/13/2021  9:58 AM CDT -----  Regarding: SUSANNE pt  General phone call:    Caller: Damian   Primary cardiologist: SUSANNE   Detailed reason for call: Pt had an echo  8/3 and would like results    Best phone number: [phone]  Best time to contact: any  Ok to leave a detailed message? yes  Device? no    Additional Info:

## 2021-08-13 NOTE — TELEPHONE ENCOUNTER
Gave pt a brief update on echo. Let him know that Dr. Crabtree is out of the office and will review in detail next week. Pt will wait Dr. Crabtree update.

## 2021-08-24 DIAGNOSIS — I10 HYPERTENSION: ICD-10-CM

## 2021-08-24 RX ORDER — METOPROLOL TARTRATE 25 MG/1
25 TABLET, FILM COATED ORAL 2 TIMES DAILY
Qty: 180 TABLET | Refills: 1 | Status: SHIPPED | OUTPATIENT
Start: 2021-08-24 | End: 2022-02-25

## 2022-02-08 ENCOUNTER — APPOINTMENT (OUTPATIENT)
Dept: CT IMAGING | Facility: CLINIC | Age: 82
End: 2022-02-08
Attending: EMERGENCY MEDICINE
Payer: MEDICARE

## 2022-02-08 ENCOUNTER — APPOINTMENT (OUTPATIENT)
Dept: CARDIOLOGY | Facility: CLINIC | Age: 82
End: 2022-02-08
Attending: INTERNAL MEDICINE
Payer: MEDICARE

## 2022-02-08 ENCOUNTER — HOSPITAL ENCOUNTER (OUTPATIENT)
Facility: CLINIC | Age: 82
Setting detail: OBSERVATION
Discharge: HOME | End: 2022-02-09
Attending: EMERGENCY MEDICINE | Admitting: FAMILY MEDICINE
Payer: MEDICARE

## 2022-02-08 DIAGNOSIS — I47.10 SVT (SUPRAVENTRICULAR TACHYCARDIA) (H): Primary | ICD-10-CM

## 2022-02-08 DIAGNOSIS — S00.81XA ABRASION OF FOREHEAD, INITIAL ENCOUNTER: ICD-10-CM

## 2022-02-08 DIAGNOSIS — R55 SYNCOPE, UNSPECIFIED SYNCOPE TYPE: ICD-10-CM

## 2022-02-08 LAB
ANION GAP SERPL CALCULATED.3IONS-SCNC: 8 MMOL/L (ref 5–18)
BASOPHILS # BLD AUTO: 0.1 10E3/UL (ref 0–0.2)
BASOPHILS NFR BLD AUTO: 1 %
BUN SERPL-MCNC: 18 MG/DL (ref 8–28)
CALCIUM SERPL-MCNC: 8.8 MG/DL (ref 8.5–10.5)
CHLORIDE BLD-SCNC: 105 MMOL/L (ref 98–107)
CO2 SERPL-SCNC: 26 MMOL/L (ref 22–31)
CREAT SERPL-MCNC: 0.97 MG/DL (ref 0.7–1.3)
EOSINOPHIL # BLD AUTO: 0.4 10E3/UL (ref 0–0.7)
EOSINOPHIL NFR BLD AUTO: 5 %
ERYTHROCYTE [DISTWIDTH] IN BLOOD BY AUTOMATED COUNT: 13.3 % (ref 10–15)
GFR SERPL CREATININE-BSD FRML MDRD: 77 ML/MIN/1.73M2
GLUCOSE BLD-MCNC: 94 MG/DL (ref 70–125)
HCT VFR BLD AUTO: 42.7 % (ref 40–53)
HGB BLD-MCNC: 13.6 G/DL (ref 13.3–17.7)
IMM GRANULOCYTES # BLD: 0 10E3/UL
IMM GRANULOCYTES NFR BLD: 0 %
LVEF ECHO: NORMAL
LYMPHOCYTES # BLD AUTO: 2.4 10E3/UL (ref 0.8–5.3)
LYMPHOCYTES NFR BLD AUTO: 27 %
MAGNESIUM SERPL-MCNC: 1.8 MG/DL (ref 1.8–2.6)
MCH RBC QN AUTO: 30.4 PG (ref 26.5–33)
MCHC RBC AUTO-ENTMCNC: 31.9 G/DL (ref 31.5–36.5)
MCV RBC AUTO: 95 FL (ref 78–100)
MONOCYTES # BLD AUTO: 0.9 10E3/UL (ref 0–1.3)
MONOCYTES NFR BLD AUTO: 11 %
NEUTROPHILS # BLD AUTO: 4.9 10E3/UL (ref 1.6–8.3)
NEUTROPHILS NFR BLD AUTO: 56 %
NRBC # BLD AUTO: 0 10E3/UL
NRBC BLD AUTO-RTO: 0 /100
PLATELET # BLD AUTO: 134 10E3/UL (ref 150–450)
POTASSIUM BLD-SCNC: 3.8 MMOL/L (ref 3.5–5)
RBC # BLD AUTO: 4.48 10E6/UL (ref 4.4–5.9)
SARS-COV-2 RNA RESP QL NAA+PROBE: NEGATIVE
SODIUM SERPL-SCNC: 139 MMOL/L (ref 136–145)
TROPONIN I SERPL-MCNC: 0.01 NG/ML (ref 0–0.29)
TROPONIN I SERPL-MCNC: 0.03 NG/ML (ref 0–0.29)
TROPONIN I SERPL-MCNC: 0.03 NG/ML (ref 0–0.29)
WBC # BLD AUTO: 8.7 10E3/UL (ref 4–11)

## 2022-02-08 PROCEDURE — 250N000013 HC RX MED GY IP 250 OP 250 PS 637: Performed by: FAMILY MEDICINE

## 2022-02-08 PROCEDURE — 93306 TTE W/DOPPLER COMPLETE: CPT | Mod: 26 | Performed by: INTERNAL MEDICINE

## 2022-02-08 PROCEDURE — G0378 HOSPITAL OBSERVATION PER HR: HCPCS

## 2022-02-08 PROCEDURE — 83735 ASSAY OF MAGNESIUM: CPT | Performed by: EMERGENCY MEDICINE

## 2022-02-08 PROCEDURE — 85025 COMPLETE CBC W/AUTO DIFF WBC: CPT | Performed by: EMERGENCY MEDICINE

## 2022-02-08 PROCEDURE — C9803 HOPD COVID-19 SPEC COLLECT: HCPCS

## 2022-02-08 PROCEDURE — 84484 ASSAY OF TROPONIN QUANT: CPT | Performed by: INTERNAL MEDICINE

## 2022-02-08 PROCEDURE — 258N000003 HC RX IP 258 OP 636: Performed by: INTERNAL MEDICINE

## 2022-02-08 PROCEDURE — 99214 OFFICE O/P EST MOD 30 MIN: CPT | Performed by: INTERNAL MEDICINE

## 2022-02-08 PROCEDURE — 70450 CT HEAD/BRAIN W/O DYE: CPT

## 2022-02-08 PROCEDURE — 36415 COLL VENOUS BLD VENIPUNCTURE: CPT | Performed by: EMERGENCY MEDICINE

## 2022-02-08 PROCEDURE — 87635 SARS-COV-2 COVID-19 AMP PRB: CPT | Performed by: INTERNAL MEDICINE

## 2022-02-08 PROCEDURE — 93005 ELECTROCARDIOGRAM TRACING: CPT | Performed by: EMERGENCY MEDICINE

## 2022-02-08 PROCEDURE — 93306 TTE W/DOPPLER COMPLETE: CPT

## 2022-02-08 PROCEDURE — 99220 PR INITIAL OBSERVATION CARE,LEVEL III: CPT | Performed by: INTERNAL MEDICINE

## 2022-02-08 PROCEDURE — 84484 ASSAY OF TROPONIN QUANT: CPT | Performed by: EMERGENCY MEDICINE

## 2022-02-08 PROCEDURE — 96361 HYDRATE IV INFUSION ADD-ON: CPT

## 2022-02-08 PROCEDURE — 96360 HYDRATION IV INFUSION INIT: CPT | Mod: XU

## 2022-02-08 PROCEDURE — 36415 COLL VENOUS BLD VENIPUNCTURE: CPT | Performed by: INTERNAL MEDICINE

## 2022-02-08 PROCEDURE — 80048 BASIC METABOLIC PNL TOTAL CA: CPT | Performed by: EMERGENCY MEDICINE

## 2022-02-08 PROCEDURE — 99285 EMERGENCY DEPT VISIT HI MDM: CPT | Mod: 25

## 2022-02-08 RX ORDER — NITROGLYCERIN 0.4 MG/1
0.4 TABLET SUBLINGUAL EVERY 5 MIN PRN
Status: DISCONTINUED | OUTPATIENT
Start: 2022-02-08 | End: 2022-02-09 | Stop reason: HOSPADM

## 2022-02-08 RX ORDER — METOPROLOL TARTRATE 25 MG/1
25 TABLET, FILM COATED ORAL 2 TIMES DAILY
Status: DISCONTINUED | OUTPATIENT
Start: 2022-02-08 | End: 2022-02-09 | Stop reason: HOSPADM

## 2022-02-08 RX ORDER — ACETAMINOPHEN 325 MG/1
650 TABLET ORAL EVERY 6 HOURS PRN
Status: DISCONTINUED | OUTPATIENT
Start: 2022-02-08 | End: 2022-02-09 | Stop reason: HOSPADM

## 2022-02-08 RX ORDER — ONDANSETRON 4 MG/1
4 TABLET, ORALLY DISINTEGRATING ORAL EVERY 6 HOURS PRN
Status: DISCONTINUED | OUTPATIENT
Start: 2022-02-08 | End: 2022-02-09 | Stop reason: HOSPADM

## 2022-02-08 RX ORDER — ONDANSETRON 2 MG/ML
4 INJECTION INTRAMUSCULAR; INTRAVENOUS EVERY 6 HOURS PRN
Status: DISCONTINUED | OUTPATIENT
Start: 2022-02-08 | End: 2022-02-09 | Stop reason: HOSPADM

## 2022-02-08 RX ORDER — PANTOPRAZOLE SODIUM 20 MG/1
40 TABLET, DELAYED RELEASE ORAL DAILY
Status: DISCONTINUED | OUTPATIENT
Start: 2022-02-08 | End: 2022-02-09 | Stop reason: HOSPADM

## 2022-02-08 RX ORDER — ATORVASTATIN CALCIUM 40 MG/1
80 TABLET, FILM COATED ORAL DAILY
Status: DISCONTINUED | OUTPATIENT
Start: 2022-02-08 | End: 2022-02-09 | Stop reason: HOSPADM

## 2022-02-08 RX ORDER — ASPIRIN 81 MG/1
81 TABLET ORAL DAILY
Status: DISCONTINUED | OUTPATIENT
Start: 2022-02-08 | End: 2022-02-09 | Stop reason: HOSPADM

## 2022-02-08 RX ORDER — SODIUM CHLORIDE 9 MG/ML
INJECTION, SOLUTION INTRAVENOUS CONTINUOUS
Status: DISCONTINUED | OUTPATIENT
Start: 2022-02-08 | End: 2022-02-09

## 2022-02-08 RX ORDER — ACETAMINOPHEN 650 MG/1
650 SUPPOSITORY RECTAL EVERY 6 HOURS PRN
Status: DISCONTINUED | OUTPATIENT
Start: 2022-02-08 | End: 2022-02-09 | Stop reason: HOSPADM

## 2022-02-08 RX ADMIN — METOPROLOL TARTRATE 25 MG: 25 TABLET, FILM COATED ORAL at 10:08

## 2022-02-08 RX ADMIN — PANTOPRAZOLE SODIUM 40 MG: 20 TABLET, DELAYED RELEASE ORAL at 10:07

## 2022-02-08 RX ADMIN — ATORVASTATIN CALCIUM 80 MG: 40 TABLET, FILM COATED ORAL at 10:08

## 2022-02-08 RX ADMIN — ASPIRIN 81 MG: 81 TABLET, COATED ORAL at 10:07

## 2022-02-08 RX ADMIN — METOPROLOL TARTRATE 25 MG: 25 TABLET, FILM COATED ORAL at 20:59

## 2022-02-08 RX ADMIN — MAGNESIUM OXIDE TAB 400 MG (241.3 MG ELEMENTAL MG) 200 MG: 400 (241.3 MG) TAB at 20:59

## 2022-02-08 RX ADMIN — MAGNESIUM OXIDE TAB 400 MG (241.3 MG ELEMENTAL MG) 200 MG: 400 (241.3 MG) TAB at 16:18

## 2022-02-08 RX ADMIN — SODIUM CHLORIDE: 9 INJECTION, SOLUTION INTRAVENOUS at 05:11

## 2022-02-08 ASSESSMENT — ENCOUNTER SYMPTOMS
PALPITATIONS: 0
WOUND: 1
SHORTNESS OF BREATH: 0
HEADACHES: 0
FEVER: 1

## 2022-02-08 ASSESSMENT — MIFFLIN-ST. JEOR
SCORE: 1424.74
SCORE: 1414.31

## 2022-02-08 ASSESSMENT — ACTIVITIES OF DAILY LIVING (ADL): DEPENDENT_IADLS:: INDEPENDENT

## 2022-02-08 NOTE — ED PROVIDER NOTES
EMERGENCY DEPARTMENT ENCOUNTER      NAME: Damian Butler  AGE: 81 year old male  YOB: 1940  MRN: [id number]  EVALUATION DATE & TIME: 2/8/2022  2:54 AM    PCP: Dada Sethi    ED PROVIDER: Omar Garcia M.D.      Chief Complaint   Patient presents with     Syncope         FINAL IMPRESSION:  1. Syncope, unspecified syncope type    2. Abrasion of forehead, initial encounter          ED COURSE & MEDICAL DECISION MAKING:    Pertinent Labs & Imaging studies reviewed. (See chart for details)  81 year old male presents to the Emergency Department for evaluation of syncope.  Patient has single episode after going to the bathroom.  Was after he had urinated so it seems less likely micturition syncope though that is in the differential.  EKG here is unchanged from previous.    Does have a significant cardiac history.  Labs otherwise unremarkable.  Head CT is negative no signs of intracranial hemorrhage.  Normal neurologic exam.  Do not suspect stroke.  Given his age cardiac history and syncope patient be admitted for further care.  Discussed with the hospitalist.    3:05 AM I met with the patient to gather history and to perform my initial exam. I discussed the plan for care while in the Emergency Department. PPE: surgical mask, eye protection, gloves.  4:34 AM I spoke with Dr. Jean Baptiste, hospitalist, who accepts patient for admission.    At the conclusion of the encounter I discussed the results of all of the tests and the disposition. The questions were answered. The patient or family acknowledged understanding and was agreeable with the care plan.       MEDICATIONS GIVEN IN THE EMERGENCY:  Medications   melatonin tablet 1 mg (has no administration in time range)   ondansetron (ZOFRAN-ODT) ODT tab 4 mg (has no administration in time range)     Or   ondansetron (ZOFRAN) injection 4 mg (has no administration in time range)   sodium chloride 0.9% infusion ( Intravenous New Bag 2/8/22 0511)   acetaminophen  (TYLENOL) tablet 650 mg (has no administration in time range)     Or   acetaminophen (TYLENOL) Suppository 650 mg (has no administration in time range)       NEW PRESCRIPTIONS STARTED AT TODAY'S ER VISIT  New Prescriptions    No medications on file          =================================================================    HPI    Patient information was obtained from: patient     Use of : N/A         Damian Butler is a 81 year old male with a pertinent history of HLD, HTN, CAD s/p CABG, paroxysmal SVT, COPD, skin cancer, who presents to this ED by EMS for evaluation of syncope.    Just prior to arrival, the patient was urinating when he suddenly became very warm. While washing his hands he lost consciousness and woke up on the ground. Denies heart palpitations, shortness of breath, chest pain prior to fall. Denies any pain. Sustained a minor wound to bridge of nose. Recalls two prior episodes of syncope in his life, once in hospital following heart surgery in 1997, and the other while he had a viral infection 4 years ago. Not anticoagulated. Denies incontinence, headache, vison changes.       REVIEW OF SYSTEMS   Review of Systems   Constitutional: Positive for fever.   Eyes: Negative for visual disturbance.   Respiratory: Negative for shortness of breath.    Cardiovascular: Negative for chest pain and palpitations.   Skin: Positive for wound (nose).   Neurological: Positive for syncope. Negative for headaches.        Negative for incontinence   All other systems reviewed and are negative.       PAST MEDICAL HISTORY:  No past medical history on file.    PAST SURGICAL HISTORY:  Past Surgical History:   Procedure Laterality Date     ABLATION OF DYSRHYTHMIC FOCUS  05/07/2019    Typical AV moy reentry     BACK SURGERY       BYPASS GRAFT ARTERY CORONARY  1997    Comments: X 2 LIMA to LAD ESPERANZA to RCA     CARDIAC ELECTROPHYSIOLOGY MAPPING AND ABLATION  2010     CV CORONARY ANGIOGRAM N/A 6/29/2018     Procedure: Coronary Angiogram;  Surgeon: Carito Flannery MD;  Location: [facility];  Service:      CV CORONARY ANGIOGRAM N/A 7/5/2018    Procedure: Coronary Angiogram;  Surgeon: aCrito Flannery MD;  Location: [facility];  Service:      CV LEFT HEART CATHETERIZATION WITHOUT LEFT VENTRICULOGRAM Left 6/29/2018    Procedure: Left Heart Catheterization Without Left Ventriculogram;  Surgeon: Carito Flannery MD;  Location: [facility] Lab;  Service:      EP ABLATION SVT N/A 5/7/2019    Procedure: EP Ablation Supra Ventricular;  Surgeon: Steve Leyva MD;  Location: [facility] Cath Lab;  Service: Cardiology     HAND SURGERY       HC REPAIR EPIGASTRIC HERNIA,REDUC N/A 7/2/2014    Procedure: EPIGASTRIC INCISIONAL HERNIA REPAIR ;  Surgeon: Daniel Gutierrez MD;  Location: [facility];  Service: General     HERNIA REPAIR       MOHS MICROGRAPHIC PROCEDURE       OR ABLATE HEART DYSRHYTHM FOCUS      Description: Catheter Ablation Atrial Supraventricular Tachycardia;  Proc Date: 04/20/2010;  Comments: AVNRT  cryoablation     TONSILLECTOMY             CURRENT MEDICATIONS:    Current Facility-Administered Medications   Medication     acetaminophen (TYLENOL) tablet 650 mg    Or     acetaminophen (TYLENOL) Suppository 650 mg     melatonin tablet 1 mg     ondansetron (ZOFRAN-ODT) ODT tab 4 mg    Or     ondansetron (ZOFRAN) injection 4 mg     sodium chloride 0.9% infusion     Current Outpatient Medications   Medication     aspirin 81 MG EC tablet     atorvastatin (LIPITOR) 80 MG tablet     betamethasone dipropionate (DIPROLENE) 0.05 % cream     desonide (DESOWEN) 0.05 % cream     fluticasone (FLONASE) 50 mcg/actuation nasal spray     glucosamine sulfate (GLUCOSAMINE) 750 mg Tab     ipratropium (ATROVENT) 0.06 % nasal spray     melatonin 3 mg Tab     methylcellulose (CITRUCEL)     metoprolol tartrate (LOPRESSOR) 25 MG tablet     multivitamin (MULTIVITAMIN) per tablet     nitroglycerin (NITROSTAT) 0.4 MG  "SL tablet     omeprazole (PRILOSEC) 20 MG capsule     sodium chloride (OCEAN) 0.65 % nasal spray     vitamin A-vitamin C-vit E-min (OCUVITE) Tab tablet         ALLERGIES:  Allergies   Allergen Reactions     Amiodarone Other (See Comments)     Fatigue, shaking     Amiodarone Analogues [Amiodarone] Muscle Pain (Myalgia)       FAMILY HISTORY:  Family History   Problem Relation Age of Onset     Hypertension Father        SOCIAL HISTORY:   Social History     Socioeconomic History     Marital status:      Spouse name: Not on file     Number of children: Not on file     Years of education: Not on file     Highest education level: Not on file   Occupational History     Not on file   Tobacco Use     Smoking status: Former Smoker     Quit date: 1973     Years since quittin.1     Smokeless tobacco: Never Used   Substance and Sexual Activity     Alcohol use: Yes     Alcohol/week: 1.0 standard drink     Comment: Alcoholic Drinks/day: daily with dinner     Drug use: No     Sexual activity: Not on file   Other Topics Concern     Not on file   Social History Narrative     Not on file     Social Determinants of Health     Financial Resource Strain: Not on file   Food Insecurity: Not on file   Transportation Needs: Not on file   Physical Activity: Not on file   Stress: Not on file   Social Connections: Not on file   Intimate Partner Violence: Not on file   Housing Stability: Not on file       VITALS:  BP (!) 155/70   Pulse 76   Temp 97.7  F (36.5  C) (Oral)   Resp 16   Ht 1.753 m (5' 9\")   Wt 73.9 kg (163 lb)   SpO2 96%   BMI 24.07 kg/m      PHYSICAL EXAM    Physical Exam  Constitutional:       General: He is not in acute distress.     Appearance: He is not diaphoretic.   HENT:      Head:      Comments: Abrasion to the bridge of the nose  Eyes:      General: No scleral icterus.     Pupils: Pupils are equal, round, and reactive to light.   Cardiovascular:      Heart sounds: Normal heart sounds.   Pulmonary:      " Effort: No respiratory distress.      Breath sounds: Normal breath sounds.   Abdominal:      General: Bowel sounds are normal.      Palpations: Abdomen is soft.      Tenderness: There is no abdominal tenderness.   Musculoskeletal:         General: No tenderness.   Skin:     General: Skin is warm.      Findings: No rash.   Neurological:      Comments: 5 out of 5 strength in bilateral upper and lower extremities.  Sensation intact in all 4 extremes.  Cranial nerves intact.  No pronator drift            LAB:  All pertinent labs reviewed and interpreted.  Labs Ordered and Resulted from Time of ED Arrival to Time of ED Departure   CBC WITH PLATELETS AND DIFFERENTIAL - Abnormal       Result Value    WBC Count 8.7      RBC Count 4.48      Hemoglobin 13.6      Hematocrit 42.7      MCV 95      MCH 30.4      MCHC 31.9      RDW 13.3      Platelet Count 134 (*)     % Neutrophils 56      % Lymphocytes 27      % Monocytes 11      % Eosinophils 5      % Basophils 1      % Immature Granulocytes 0      NRBCs per 100 WBC 0      Absolute Neutrophils 4.9      Absolute Lymphocytes 2.4      Absolute Monocytes 0.9      Absolute Eosinophils 0.4      Absolute Basophils 0.1      Absolute Immature Granulocytes 0.0      Absolute NRBCs 0.0     BASIC METABOLIC PANEL - Normal    Sodium 139      Potassium 3.8      Chloride 105      Carbon Dioxide (CO2) 26      Anion Gap 8      Urea Nitrogen 18      Creatinine 0.97      Calcium 8.8      Glucose 94      GFR Estimate 77     MAGNESIUM - Normal    Magnesium 1.8     TROPONIN I - Normal    Troponin I 0.01     COVID-19 VIRUS (CORONAVIRUS) BY PCR - Normal    SARS CoV2 PCR Negative         RADIOLOGY:  Reviewed all pertinent imaging. Please see official radiology report.  CT Head w/o Contrast   Final Result   IMPRESSION:   1.  No acute intracranial process.   2.  Acute on chronic maxillary sinusitis.      Echocardiogram Complete    (Results Pending)       EKG:    Performed at: 300  Impression: Sinus rhythm  with first-degree AV block.  Left atrial margin.  Compared to previous dated July 5, 2018 no significant change  Sinus rhythm retrograde 78.  LA interval 266.  .  QTc 458    I have independently reviewed and interpreted the EKG(s) documented above.    PROCEDURES:   None      I, Estrella Frost, am serving as a scribe to document services personally performed by Dr. Omar Garcia, based on my observation and the provider's statements to me. I, Omar Garcia MD attest that Estrella Frost is acting in a scribe capacity, has observed my performance of the services and has documented them in accordance with my direction.    Omar Garcia M.D.  Emergency Medicine  [facility] EMERGENCY ROOM  [address] [id number] [phone]  Dept: [phone]         Omar Garcia MD  02/08/22 0532

## 2022-02-08 NOTE — CONSULTS
Care Management Initial Consult    General Information  Assessment completed with: Patient,    Type of CM/SW Visit: Initial Assessment    Primary Care Provider verified and updated as needed: Yes   Readmission within the last 30 days: no previous admission in last 30 days      Reason for Consult: discharge planning  Advance Care Planning: Advance Care Planning Reviewed: other (comment) (Declined Honoring Choices information)          Communication Assessment  Patient's communication style: spoken language (English or Bilingual)    Hearing Difficulty or Deaf: no   Wear Glasses or Blind: yes    Cognitive  Cognitive/Neuro/Behavioral: WDL                      Living Environment:   People in home: spouse  Herminia  Current living Arrangements: condominium      Able to return to prior arrangements: yes  Living Arrangement Comments:  (Lives with wife)    Family/Social Support:  Care provided by: self  Provides care for: no one  Marital Status:   Wife  Herminia       Description of Support System: Supportive,Involved    Support Assessment: Adequate family and caregiver support,Patient communicates needs well met    Current Resources:   Patient receiving home care services: No     Community Resources: None  Equipment currently used at home: none  Supplies currently used at home: None    Employment/Financial:  Employment Status: retired        Financial Concerns: No concerns identified   Referral to Financial Counselor: No       Lifestyle & Psychosocial Needs:  Social Determinants of Health     Tobacco Use: Medium Risk     Smoking Tobacco Use: Former Smoker     Smokeless Tobacco Use: Never Used   Alcohol Use: Not on file   Financial Resource Strain: Not on file   Food Insecurity: Not on file   Transportation Needs: Not on file   Physical Activity: Not on file   Stress: Not on file   Social Connections: Not on file   Intimate Partner Violence: Not on file   Depression: Not on file   Housing Stability: Not on file        Functional Status:  Prior to admission patient needed assistance:   Dependent ADLs:: Independent  Dependent IADLs:: Independent  Assesssment of Functional Status: At functional baseline    Mental Health Status:          Chemical Dependency Status:                Values/Beliefs:  Spiritual, Cultural Beliefs, Latter day Practices, Values that affect care:                 Additional Information:  Alert, oriented patient lives with wife Herminia in a one-level town home. States he s independent with I/ADLs; Doesn t use assistive device; no home care services does drive. Explained and discussed MOON/Observation status and patient verbalized understanding. Declined Honoring Choices information. Wife will transport patient on discharge. Other needs pending clinical progress.    ARLINE ROSE, RN/CM

## 2022-02-08 NOTE — H&P
[facility] MEDICINE ADMISSION HISTORY AND PHYSICAL       Assessment & Plan      1. Syncope - Could be cardiac, specially with history of AV moy ablation due to  Inducible typical AV moy reentry tachycardia. History of SVT.    Did not appear to be neuro, as he is GCS 15 and no focal deficits. His head CT is negative  Did not appear to be orthostatic as he was not dizzy and able to walk 20-25 ft from bed to bathroom  His Hgb is normal    - do orthostatics, check ECHO, consider cardiology eval  - he may need holter or event monitor  - repeat labs in AM     2. History of CAD, Stents  3. History of HTN and HL         VTE prophylaxis: SCDs and ambulation  IV fluids: Per order set   Diet:  cardiac  GI prophylaxis: Not indicated at this point, re-assess if needed.   Pain, sleep, and vomiting medications: Per order set  Code Status: Full   COVID test result:  negative   COVID vaccination: completed   Barriers to discharge: admitting clinical condition  Discharge Disposition and goals:  Unable to determine at this point, pending clinical progress and response to treatment. Patient may need transfer to SNF or [facility] if unsafe to go home and needed treatment inappropriate at home setting OR may need home health care evaluation if care can be delivered at home settings. Consider referral to care manager/    PPE - I was wearing PPE when I met the patient - N95 mask, Surgical mask, Isolation gown, Gloves, Safety glasses.       Care plan was created based on available information provided, including patient's condition at the time of encounter.   This plan was discussed with patient and/or family members using layman's terms and have agreed to proceed.   At the end of night shift (9PM - 730A), this case will be presented to the AM Hospitalist.    It is recommended to revise care plan if there is change in condition and/or new clinical information that are not available during my encounter.      All or some of home medication/s were not resumed on admission due to safety reasons or contraindications. Dosing and frequency may also have been modified. Please resume/review them during your visit.     70 minutes of total visit duration and greater than 50% was spent in direct evaluation of patient and coordination of care including discussion of diagnostic test results and recommended treatment. .      Regino Jean Baptiste MD, MPH, FACP, [facility]  Internal Medicine - Hospitalist        Chief Complaint Pass out      HISTORY     - He was awake and interactive when I met him in the ED. He has a bruise on the bridge of his nose.  - He came in because he passed out. He got up to pee, walked 20-25 ft to the bathroom, did OK. After peeing, he washed his hands, felt warm and next thing he remember he was on the floor. His wife, tried to open the door, but seems he was stuck behind it.   - No CP or SOB. No palpitations. No diarrhea. No GI bleed.     - Back in May 2019 -- he required Cryoablation/radiofrequency ablation of slow AV moy pathway due to SVT --- Postoperative diagnosis:    Inducible typical AV moy reentry tachycardia --  Successful ablation of slow AV moy pathway with cryoablation --  No inducible SVT following ablation     - In the ED, EKG showed SR and no prolonged QTc. Head CT was negative.     - ROS --- No headache. No dizziness. No weakness. No CP or SOB. No palpitations. No abdominal pain. No nausea or vomiting. No urinary symptoms. No bleeding symptoms. No weight loss. Rest of 12 point ROS was reviewed and negative.       Past Medical History     Arrythmmia  CAD    Surgical History     Past Surgical History:   Procedure Laterality Date     ABLATION OF DYSRHYTHMIC FOCUS  05/07/2019    Typical AV moy reentry     BACK SURGERY       BYPASS GRAFT ARTERY CORONARY  1997    Comments: X 2 LIMA to LAD ESPERANZA to RCA     CARDIAC ELECTROPHYSIOLOGY MAPPING AND ABLATION  2010     CV CORONARY ANGIOGRAM N/A 6/29/2018     Procedure: Coronary Angiogram;  Surgeon: Carito Flannery MD;  Location: [facility] Cath Lab;  Service:      CV CORONARY ANGIOGRAM N/A 2018    Procedure: Coronary Angiogram;  Surgeon: Carito Flannery MD;  Location: [facility] Cath Lab;  Service:      CV LEFT HEART CATHETERIZATION WITHOUT LEFT VENTRICULOGRAM Left 2018    Procedure: Left Heart Catheterization Without Left Ventriculogram;  Surgeon: Carito Flannery MD;  Location: [facility] Cath Lab;  Service:      EP ABLATION SVT N/A 2019    Procedure: EP Ablation Supra Ventricular;  Surgeon: Steve Leyva MD;  Location: [facility] Cath Lab;  Service: Cardiology     HAND SURGERY       HC REPAIR EPIGASTRIC HERNIA,REDUC N/A 2014    Procedure: EPIGASTRIC INCISIONAL HERNIA REPAIR ;  Surgeon: Daniel Gutierrez MD;  Location: [facility];  Service: General     HERNIA REPAIR       MOHS MICROGRAPHIC PROCEDURE       TX ABLATE HEART DYSRHYTHM FOCUS      Description: Catheter Ablation Atrial Supraventricular Tachycardia;  Proc Date: 2010;  Comments: AVNRT  cryoablation     TONSILLECTOMY          Family History      Family History   Problem Relation Age of Onset     Hypertension Father          Social History      .  Social History     Socioeconomic History     Marital status:      Spouse name: Not on file     Number of children: Not on file     Years of education: Not on file     Highest education level: Not on file   Occupational History     Not on file   Tobacco Use     Smoking status: Former Smoker     Quit date: 1973     Years since quittin.1     Smokeless tobacco: Never Used   Substance and Sexual Activity     Alcohol use: Yes     Alcohol/week: 1.0 standard drink     Comment: Alcoholic Drinks/day: daily with dinner     Drug use: No     Sexual activity: Not on file   Other Topics Concern     Not on file   Social History Narrative     Not on file     Social Determinants of Health     Financial Resource Strain: Not on file    Food Insecurity: Not on file   Transportation Needs: Not on file   Physical Activity: Not on file   Stress: Not on file   Social Connections: Not on file   Intimate Partner Violence: Not on file   Housing Stability: Not on file          Allergies        Allergies   Allergen Reactions     Amiodarone Other (See Comments)     Fatigue, shaking     Amiodarone Analogues [Amiodarone] Muscle Pain (Myalgia)         Prior to Admission Medications      No current facility-administered medications on file prior to encounter.  aspirin 81 MG EC tablet, [ASPIRIN 81 MG EC TABLET] Take 1 tablet (81 mg total) by mouth daily.  atorvastatin (LIPITOR) 80 MG tablet, [ATORVASTATIN (LIPITOR) 80 MG TABLET] Take 80 mg by mouth daily.  betamethasone dipropionate (DIPROLENE) 0.05 % cream, [BETAMETHASONE DIPROPIONATE (DIPROLENE) 0.05 % CREAM] Apply 1 application topically 2 (two) times a day as needed.  desonide (DESOWEN) 0.05 % cream, [DESONIDE (DESOWEN) 0.05 % CREAM] Apply topically 2 (two) times a day as needed.  fluticasone (FLONASE) 50 mcg/actuation nasal spray, [FLUTICASONE (FLONASE) 50 MCG/ACTUATION NASAL SPRAY] Apply 2 sprays into each nostril every evening.   glucosamine sulfate (GLUCOSAMINE) 750 mg Tab, [GLUCOSAMINE SULFATE (GLUCOSAMINE) 750 MG TAB] Take 1,500 mg by mouth daily.   ipratropium (ATROVENT) 0.06 % nasal spray, [IPRATROPIUM (ATROVENT) 0.06 % NASAL SPRAY] Apply 2 sprays into each nostril 2 (two) times a day.  melatonin 3 mg Tab, [MELATONIN 3 MG TAB] Take 6 mg by mouth bedtime.   methylcellulose (CITRUCEL), Take 2 g by mouth every other day   metoprolol tartrate (LOPRESSOR) 25 MG tablet, Take 1 tablet (25 mg) by mouth 2 times daily  multivitamin (MULTIVITAMIN) per tablet, [MULTIVITAMIN (MULTIVITAMIN) PER TABLET] Take 1 tablet by mouth daily.  nitroglycerin (NITROSTAT) 0.4 MG SL tablet, [NITROGLYCERIN (NITROSTAT) 0.4 MG SL TABLET] Place 0.4 mg under the tongue every 5 (five) minutes as needed for chest pain.  omeprazole  "(PRILOSEC) 20 MG capsule, [OMEPRAZOLE (PRILOSEC) 20 MG CAPSULE] Take 20 mg by mouth daily.  sodium chloride (OCEAN) 0.65 % nasal spray, Spray 2 sprays into both nostrils 2 times daily   vitamin A-vitamin C-vit E-min (OCUVITE) Tab tablet, [VITAMIN A-VITAMIN C-VIT E-MIN (OCUVITE) TAB TABLET] Take 1 tablet by mouth daily.            Review of Systems     A 12 point comprehensive review of systems was negative except as noted above in HPI.    PHYSICAL EXAMINATION       Vitals      Vitals: BP (!) 155/70   Pulse 76   Temp 97.7  F (36.5  C) (Oral)   Resp 16   Ht 1.753 m (5' 9\")   Wt 73.9 kg (163 lb)   SpO2 96%   BMI 24.07 kg/m    BMI= Body mass index is 24.07 kg/m .      Examination     General Appearance:  Alert, cooperative, no distress  Head:    Normocephalic, without obvious abnormality, atraumatic  EENT:  PERRL, conjunctiva/corneas clear, EOM's intact.   Neck:   Supple, symmetrical, trachea midline, no adenopathy; no NVE  Back:  Symmetric, no curvature, no CVA tenderness  Chest/Lungs: Clear to auscultation bilaterally, respirations unlabored, No tenderness or deformity. No abdominal breathing or use of accessory muscles.   Heart:    Regular rate and rhythm, S1 and S2 normal, no murmur, rub   or gallop  Abdomen: Soft, non-tender, bowel sounds active all four quadrants, not peritoneal on palpation. Not distended  Extremities:  Extremities normal, atraumatic, no swelling   Skin:  Mild bruise bridge of nose  Neurologic:  Awake and alert, No lateralizing or localizing signs.         Pertinent Lab     Results for orders placed or performed during the hospital encounter of 02/08/22   CT Head w/o Contrast    Impression    IMPRESSION:  1.  No acute intracranial process.  2.  Acute on chronic maxillary sinusitis.   Basic metabolic panel   Result Value Ref Range    Sodium 139 136 - 145 mmol/L    Potassium 3.8 3.5 - 5.0 mmol/L    Chloride 105 98 - 107 mmol/L    Carbon Dioxide (CO2) 26 22 - 31 mmol/L    Anion Gap 8 5 - 18 " mmol/L    Urea Nitrogen 18 8 - 28 mg/dL    Creatinine 0.97 0.70 - 1.30 mg/dL    Calcium 8.8 8.5 - 10.5 mg/dL    Glucose 94 70 - 125 mg/dL    GFR Estimate 77 >60 mL/min/1.73m2   Result Value Ref Range    Magnesium 1.8 1.8 - 2.6 mg/dL   Result Value Ref Range    Troponin I 0.01 0.00 - 0.29 ng/mL   CBC with platelets and differential   Result Value Ref Range    WBC Count 8.7 4.0 - 11.0 10e3/uL    RBC Count 4.48 4.40 - 5.90 10e6/uL    Hemoglobin 13.6 13.3 - 17.7 g/dL    Hematocrit 42.7 40.0 - 53.0 %    MCV 95 78 - 100 fL    MCH 30.4 26.5 - 33.0 pg    MCHC 31.9 31.5 - 36.5 g/dL    RDW 13.3 10.0 - 15.0 %    Platelet Count 134 (L) 150 - 450 10e3/uL    % Neutrophils 56 %    % Lymphocytes 27 %    % Monocytes 11 %    % Eosinophils 5 %    % Basophils 1 %    % Immature Granulocytes 0 %    NRBCs per 100 WBC 0 <1 /100    Absolute Neutrophils 4.9 1.6 - 8.3 10e3/uL    Absolute Lymphocytes 2.4 0.8 - 5.3 10e3/uL    Absolute Monocytes 0.9 0.0 - 1.3 10e3/uL    Absolute Eosinophils 0.4 0.0 - 0.7 10e3/uL    Absolute Basophils 0.1 0.0 - 0.2 10e3/uL    Absolute Immature Granulocytes 0.0 <=0.4 10e3/uL    Absolute NRBCs 0.0 10e3/uL   ECG 12-LEAD WITH MUSE (LHE)   Result Value Ref Range    Systolic Blood Pressure 175 mmHg    Diastolic Blood Pressure 82 mmHg    Ventricular Rate 78 BPM    Atrial Rate 78 BPM    IL Interval 266 ms    QRS Duration 100 ms     ms    QTc 458 ms    P Axis 69 degrees    R AXIS 74 degrees    T Axis 28 degrees    Interpretation ECG       Sinus rhythm with 1st degree A-V block  Possible Left atrial enlargement  Cannot rule out Inferior infarct (cited on or before 05-JUL-2018)  Abnormal ECG  When compared with ECG of 05-JUL-2018 11:00,  No significant change was found             Pertinent Radiology

## 2022-02-08 NOTE — CONSULTS
HEART CARE CONSULTATON NOTE            Reason for consult: 81-year-old male with a history of bypass, coronary stenting and prior ablation who is admitted after syncope at home.  Patient with a syncopal episode after getting up to go to the bathroom last evening.  Primary cardiologist is Dr. Crabtree.     Assessment:   1.  Syncope after getting up to use the bathroom.  Findings sound most consistent with orthostatic/vagal based on the description of feeling warm which came on suddenly.  He does have significant cardiovascular history that includes coronary artery disease, SVT.  Echocardiogram per report is suggesting an inferior wall motion abnormality which is noted on the current echocardiogram compared to previous.  Troponins have been unremarkable thus far.  No acute ST-T segment changes noted on the EKG. we discussed changing positions slowly and even considering sitting on the toilet while urinating at night.    2.  Coronary artery disease.  Patient denies chest pain or significant change in exercise tolerance.  Echocardiogram as described with more prominent inferior wall motion abnormality per the report.  Troponins negative this admission.     Plan:   1.  Monitor on telemetry  2.  Lexiscan nuclear stress test tomorrow  3.  Carotid ultrasound.  Left carotid bruit auscultated seems out of proportion to the murmur of aortic stenosis.   History:      HPI: 81-year-old male who follows with Dr. Crabtree from our group.  Patient with a history of coronary artery disease with myocardial infarction in 1997 and at that time underwent two-vessel bypass with reportedly a small graft to the LAD and ESPERANZA graft to the RCA.  He underwent repeat coronary angiography July 2018 with successful PCI of the distal right coronary artery via ESPERANZA graft with placement of 3 drug-eluting stents.  He saw Dr. Crabtree July 2021 reported no anginal symptoms at that time.  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  Dr. Crabtree's note July 2021 indicated no recurrence.  An echocardiogram July 2021 was reported normal systolic function with normal wall motion, mild aortic stenosis and mild aortic insufficiency.  Echocardiogram interpreted by  today for the EF of 50 to 55% with inferior wall hypokinesis and mild to moderate valvular aortic stenosis.  It was felt that the inferior wall was hypokinetic on today's study compared to previous study per the report.  Patient reported that he forgot to take his metoprolol last evening.  He reported no complaints of chest discomfort.  He does endorse perhaps mild increased dyspnea while climbing stairs but states this is mild.    Prior to coming to the ER he was urinating and suddenly became warm and while washing his hands he lost consciousness and woke up on the ground.  He reported to the ER physician no complaints of heart palpitation, shortness of breath or chest discomfort.  He suffered a minor wound to the bridge of his nose.  He did endorse a reported fever    Past medical history  Coronary artery disease with details listed above  History of AV node ablation on 2 separate occasions.  Described as AV moy tachycardia  Anxiety        Arrhythmia       pvc and vt     Basal cell carcinoma       chin     COPD (chronic obstructive pulmonary disease) (H)       Coronary artery disease       GERD (gastroesophageal reflux disease)       Herniated disc       Hyperlipidemia       Hypertension       Macular degeneration       Myocardial infarction (H) 1997     PVC (premature ventricular contraction)       Rhinitis       SVT (supraventricular tachycardia) (H)       Syncope        Past Surgical History:   Procedure Laterality Date     ABLATION OF DYSRHYTHMIC FOCUS  05/07/2019    Typical AV moy reentry     BACK SURGERY       BYPASS GRAFT ARTERY CORONARY  1997     Comments: X 2 LIMA to LAD ESPERANZA to RCA     CARDIAC ELECTROPHYSIOLOGY MAPPING AND ABLATION       CV CORONARY ANGIOGRAM N/A 2018    Procedure: Coronary Angiogram;  Surgeon: Carito Flannery MD;  Location: [facility];  Service:      CV CORONARY ANGIOGRAM N/A 2018    Procedure: Coronary Angiogram;  Surgeon: Carito Flannery MD;  Location: [facility];  Service:      CV LEFT HEART CATHETERIZATION WITHOUT LEFT VENTRICULOGRAM Left 2018    Procedure: Left Heart Catheterization Without Left Ventriculogram;  Surgeon: Carito Flannery MD;  Location: [facility];  Service:      EP ABLATION SVT N/A 2019    Procedure: EP Ablation Supra Ventricular;  Surgeon: Steve Leyva MD;  Location: [facility];  Service: Cardiology     HAND SURGERY       HC REPAIR EPIGASTRIC HERNIA,REDUC N/A 2014    Procedure: EPIGASTRIC INCISIONAL HERNIA REPAIR ;  Surgeon: Daniel Gutierrez MD;  Location: [facility];  Service: General     HERNIA REPAIR       MOHS MICROGRAPHIC PROCEDURE       WA ABLATE HEART DYSRHYTHM FOCUS      Description: Catheter Ablation Atrial Supraventricular Tachycardia;  Proc Date: 2010;  Comments: AVNRT  cryoablation     TONSILLECTOMY        Social History     Socioeconomic History     Marital status:      Spouse name: Not on file     Number of children: Not on file     Years of education: Not on file     Highest education level: Not on file   Occupational History     Not on file   Tobacco Use     Smoking status: Former Smoker     Quit date: 1973     Years since quittin.1     Smokeless tobacco: Never Used   Substance and Sexual Activity     Alcohol use: Yes     Alcohol/week: 1.0 standard drink     Comment: Alcoholic Drinks/day: daily with dinner     Drug use: No     Sexual activity: Not on file   Other Topics Concern     Not on file   Social History Narrative     Not on file     Social Determinants of Health     Financial Resource Strain:  Not on file   Food Insecurity: Not on file   Transportation Needs: Not on file   Physical Activity: Not on file   Stress: Not on file   Social Connections: Not on file   Intimate Partner Violence: Not on file   Housing Stability: Not on file     Family History   Problem Relation Age of Onset     Hypertension Father      Allergies   Allergen Reactions     Amiodarone Other (See Comments)     Fatigue, shaking     Amiodarone Analogues [Amiodarone] Muscle Pain (Myalgia)     Current Outpatient Medications   Medication Sig Dispense Refill     aspirin 81 MG EC tablet [ASPIRIN 81 MG EC TABLET] Take 1 tablet (81 mg total) by mouth daily.       atorvastatin (LIPITOR) 80 MG tablet [ATORVASTATIN (LIPITOR) 80 MG TABLET] Take 80 mg by mouth daily.       betamethasone dipropionate (DIPROLENE) 0.05 % cream [BETAMETHASONE DIPROPIONATE (DIPROLENE) 0.05 % CREAM] Apply 1 application topically 2 (two) times a day as needed.       desonide (DESOWEN) 0.05 % cream [DESONIDE (DESOWEN) 0.05 % CREAM] Apply topically 2 (two) times a day as needed.       fluticasone (FLONASE) 50 mcg/actuation nasal spray [FLUTICASONE (FLONASE) 50 MCG/ACTUATION NASAL SPRAY] Apply 2 sprays into each nostril every evening.        glucosamine sulfate (GLUCOSAMINE) 750 mg Tab [GLUCOSAMINE SULFATE (GLUCOSAMINE) 750 MG TAB] Take 1,500 mg by mouth daily.        ipratropium (ATROVENT) 0.06 % nasal spray [IPRATROPIUM (ATROVENT) 0.06 % NASAL SPRAY] Apply 2 sprays into each nostril 2 (two) times a day.       melatonin 3 mg Tab [MELATONIN 3 MG TAB] Take 6 mg by mouth bedtime.        methylcellulose (CITRUCEL) Take 2 g by mouth daily as needed        metoprolol tartrate (LOPRESSOR) 25 MG tablet Take 1 tablet (25 mg) by mouth 2 times daily 180 tablet 1     multivitamin (MULTIVITAMIN) per tablet [MULTIVITAMIN (MULTIVITAMIN) PER TABLET] Take 1 tablet by mouth daily.       nitroglycerin (NITROSTAT) 0.4 MG SL tablet [NITROGLYCERIN (NITROSTAT) 0.4 MG SL TABLET] Place 0.4 mg under  "the tongue every 5 (five) minutes as needed for chest pain.       omeprazole (PRILOSEC) 20 MG capsule [OMEPRAZOLE (PRILOSEC) 20 MG CAPSULE] Take 20 mg by mouth daily.       sodium chloride (OCEAN) 0.65 % nasal spray Spray 2 sprays into both nostrils 2 times daily Use 30 min before Atrovent spray.       vitamin A-vitamin C-vit E-min (OCUVITE) Tab tablet [VITAMIN A-VITAMIN C-VIT E-MIN (OCUVITE) TAB TABLET] Take 1 tablet by mouth daily.           Review of Systems  All pertinent positives and negatives reviewed in History.  All other 10 point review of systems reviewed and negative.      Objective:    Physical Exam  VITALS: BP (!) 154/72   Pulse 59   Temp 97.7  F (36.5  C) (Oral)   Resp 15   Ht 1.753 m (5' 9\")   Wt 73.9 kg (163 lb)   SpO2 96%   BMI 24.07 kg/m    BMI: Body mass index is 24.07 kg/m .  Wt Readings from Last 3 Encounters:   02/08/22 73.9 kg (163 lb)   07/29/21 76.2 kg (168 lb)   07/08/19 75.8 kg (167 lb 1 oz)       Intake/Output Summary (Last 24 hours) at 2/8/2022 1347  Last data filed at 2/8/2022 1019  Gross per 24 hour   Intake --   Output 800 ml   Net -800 ml     General Appearance:  Alert, cooperative, no distress, appears stated age   HEENT:  Normocephalic, without obvious abnormality   Neck: Supple, symmetrical, trachea midline, no adenopathy, thyroid: not enlarged, symmetric, bilateral carotid bruit left greater than right versus radiation of systolic murmur, no appreciated jugular venous distention   Back:   Symmetric, no curvature, ROM normal, no CVA tenderness   Lungs:    Mildly diminished at the bases, respirations unlabored   Chest Wall:  No tenderness or deformity, status post bypass surgery   Heart:  Regular rate and rhythm, S1, S2, 2/6 systolic murmur mid peaking   Abdomen:   Soft, non-tender, bowel sounds active all four quadrants,  no masses, no organomegaly   Extremities: Extremities normal, atraumatic, no cyanosis or edema   Skin: Skin color, texture, turgor normal, no rashes or " lesions   Neurologic: Alert and oriented X 3, Moves all 4 extremities     Cardiographics  ECG:  hrs. sinus rhythm with first-degree AV block, nonspecific ST-T changes, cannot exclude inferior infarct no significant change from 2018Echocardiogram:    Name: BEATRIZ COLLIER  MRN: [id number]  : 1940  Study Date: 2022 08:33 AM  Age: 81 yrs  Gender: Male  Patient Location: [facility]  Reason For Study: Syncope  Ordering Physician: LYNDA JENKINS  Performed By: ED     BSA: 1.9 m2  Height: 69 in  Weight: 163 lb  HR: 94  BP: 144/77 mmHg  ______________________________________________________________________________  ______________________________________________________________________________  Interpretation Summary     There is mild concentric left ventricular hypertrophy.  The visual ejection fraction is 50-55%.  There is moderate inferior wall hypokinesis.  There is mild (1+) mitral regurgitation.  Mild to moderate valvular aortic stenosis.  There is moderate (2+) aortic regurgitation.  There is trace to mild tricuspid regurgitation.  Compared to the prior study dated 8/3/2021, there are changes as noted. The  inferior wall appears hypokinetic on the current study. Minimal progression in  the aortic valve stenosis.  ______________________________________________________________________________  Left Ventricle  The left ventricle is normal in size. There is mild concentric left  ventricular hypertrophy. The visual ejection fraction is 50-55%. Diastolic  Doppler findings (E/E' ratio and/or other parameters) suggest left ventricular  filling pressures are indeterminate. There is moderate inferior wall  hypokinesis.     Right Ventricle  The right ventricle is normal in size and function.     Atria  Normal left atrial size. Right atrial size is normal. Intact atrial septum.     Mitral Valve  Mitral valve leaflets appear normal. There is mild (1+) mitral regurgitation.  The mitral  regurgitant jet is posteriorly directed, which is consistent with  anterior leaflet pathology.     Tricuspid Valve  Tricuspid valve leaflets appear normal. There is trace to mild tricuspid  regurgitation.     Aortic Valve  Mild aortic valve calcification is present. There is moderate (2+) aortic  regurgitation. Mild to moderate valvular aortic stenosis.     Pulmonic Valve  The pulmonic valve is not well visualized. There is no pulmonic valvular  regurgitation.     Vessels  The aorta root is normal. The ascending aorta is Mildly dilated. IVC diameter  <2.1 cm collapsing >50% with sniff suggests a normal RA pressure of 3 mmHg.     Pericardium  There is no pericardial effusion.    Reading physician: Carito Flannery MD Ordering physician: Efe Doan MD Study date: 06/29/2018       Patient Information    Name MRN Description   Damian Butler [id number] 77 year old male     Indications    CAD (coronary artery disease) [I25.10 (ICD-10-CM)]         Conclusion      Exertional dyspnea and fatigue. Abnormal stress test. CABG 1997 with LIMA to LAD and ESPERANZA to RCA in setting of anterior STEMI.    Occluded mid LAD after the large first diagonal. Mid and distal LAD fed by a widely patent LIMA. Moderate proximal diagonal disease. Left main and circumflex without significant atherosclerosis.    RCA occluded in mid RCA. Mid and distal RCA fed by ESPERANZA. Attempts to pass a wire thru the very long ESPERANZA via the tortuous access of the 90cm JUDY catheter was difficult despite using a 2.0 mm balloon catheter for support. The catheter and wire kinked   and could not be pulled back into the guide catheter. Wire, balloon and guide removed together.    LV EDP normal.    Estimated blood loss was <20 ml.           Imaging  Chest x-ray:     EXAM: CT HEAD W/O CONTRAST  LOCATION: [facility]  DATE/TIME: 2/8/2022 3:32 AM     INDICATION: Head trauma, minor (Age >= 65y)  COMPARISON: None.  TECHNIQUE: Routine CT Head  without IV contrast. Multiplanar reformats. Dose reduction techniques were used.     FINDINGS:  INTRACRANIAL CONTENTS: No intracranial hemorrhage, extraaxial collection, or mass effect.  No CT evidence of acute infarct. Moderate presumed chronic small vessel ischemic changes. Mild to moderate generalized volume loss. No hydrocephalus.      VISUALIZED ORBITS/SINUSES/MASTOIDS: No intraorbital abnormality. Acute on chronic maxillary sinusitis. No middle ear or mastoid effusion.     BONES/SOFT TISSUES: No acute abnormality.                                                                      IMPRESSION:  1.  No acute intracranial process.  2.  Acute on chronic maxillary sinusitis.   Lab Results    Chemistry/lipid CBC Cardiac Enzymes/BNP/TSH/INR   Recent Labs   Lab Test 06/14/21  0912   CHOL 114   HDL 39*   LDL 65   TRIG 52     Recent Labs   Lab Test 06/14/21  0912 12/14/20  0850 12/12/19  0830   LDL 65 64 57     Recent Labs   Lab Test 02/08/22  0325      POTASSIUM 3.8   CHLORIDE 105   CO2 26   GLC 94   BUN 18   CR 0.97   GFRESTIMATED 77   CHICHO 8.8     Recent Labs   Lab Test 02/08/22  0325 06/14/21  0912 12/14/20  0850   CR 0.97 0.86 0.83     No results for input(s): A1C in the last 04545 hours.       Recent Labs   Lab Test 02/08/22  0325   WBC 8.7   HGB 13.6   HCT 42.7   MCV 95   *     Recent Labs   Lab Test 02/08/22  0325 05/07/19  0614 07/06/18  0554   HGB 13.6 14.2 12.2*    Recent Labs   Lab Test 02/08/22  1213 02/08/22  0820 02/08/22  0325   TROPONINI 0.03 0.03 0.01     No results for input(s): BNP, NTBNPI, NTBNP in the last 41688 hours.  No results for input(s): TSH in the last 22281 hours.  No results for input(s): INR in the last 83424 hours.

## 2022-02-08 NOTE — ED NOTES
Bed: [facility]-15  Expected date: 2/8/22  Expected time: 2:48 AM  Means of arrival: Ambulance  Comments:

## 2022-02-08 NOTE — PROGRESS NOTES
[facility] MEDICINE PROGRESS NOTE      Identification/Summary: Damian Butler is a 81 year old male with an extensive cardiac history including CABG, stenting x3, and ablations x2 presents with syncope at home.  New wall motion abnormality noted on echo.  No arrhythmias on telemetry since admission.  In light of complicated history with new wall motion issue will ask for cardiology input on management.    Assessment and Plan:  Syncope/history of SVT/hypomagnesemia.    Orthostatics okay.  No significant arrhythmia on monitoring.   May have not taken his metoprolol last evening.    Rate improved after dosing today.    Echo reviewed.  Possibly new wall motion issue.    Will ask cards to evaluate.  Troponins negative x3  Borderline mag.  Replete.    Inferior wall motion abnormality/hypokinesis.  New since previous study per report.  May need coronary evaluation.  Due to complicated history will ask for cards to give their input.  Troponins negative x3  COPD.  Essential hypertension.  Hyperlipidemia.  Aortic stenosis.  Described as mild to moderate on imaging.  Doubt cause of syncope.      Diet: Combination Diet No Caffeine Diet, Low Saturated Fat Na <2400mg Diet  DVT Prophylaxis: ambulation  Code Status: Full Code    Anticipated possible discharge in 1 day if cardiac eval unremarkable.    Bradley Stevenson MD  [facility]  [facility]  Phone: #[phone]    Interval History/Subjective:  81-year-old male with extensive cardiac history presented with syncope at home.  History of SVT status post ablation in the past on chronic metoprolol therapy.  He had forgotten to take metoprolol last night.  No chest pain.  Wall motion abnormality noted on echo with inferior wall hypokinesis and progression of aortic stenosis which is mild to moderate.  Patient is feeling okay now.  Will ask for cardiology to give input on whether or not he needs further work-up  for ischemia.  Troponins negative and initial ECG unremarkable.    Physical Exam/Objective:  Temp:  [97.7  F (36.5  C)] 97.7  F (36.5  C)  Pulse:  [58-91] 59  Resp:  [12-26] 15  BP: (137-190)/(58-96) 154/72  SpO2:  [93 %-99 %] 96 %  Body mass index is 24.07 kg/m .    GENERAL:  Alert, appears comfortable, in no acute distress, appears stated age   HEAD:  Normocephalic, without obvious abnormality, atraumatic   EYES:  PERRL, conjunctiva/corneas clear, no scleral icterus, EOM's intact   NECK: Supple, symmetrical, trachea midline   LUNGS:   Clear to auscultation bilaterally, no rales, rhonchi, or wheezing, symmetric chest rise on inhalation, respirations unlabored   CHEST WALL:  No tenderness or deformity   HEART:  Regular rate and rhythm, S1 and S2 normal, no murmur, rub, or gallop    ABDOMEN:   Soft, non-tender, bowel sounds active all four quadrants, no masses, no organomegaly, no rebound or guarding   EXTREMITIES: Extremities normal, atraumatic, no cyanosis or edema    SKIN: Dry to touch, no exanthems in the visualized areas   NEURO: Alert, oriented x3, moves all four extremities freely   PSYCH: Cooperative, behavior is appropriate      Data reviewed today: I personally reviewed all new medications, labs, imaging/diagnostics reports over the past 24 hours. Pertinent findings include:    Imaging:   Recent Results (from the past 24 hour(s))   CT Head w/o Contrast    Narrative    EXAM: CT HEAD W/O CONTRAST  LOCATION: [facility]  DATE/TIME: 2/8/2022 3:32 AM    INDICATION: Head trauma, minor (Age >= 65y)  COMPARISON: None.  TECHNIQUE: Routine CT Head without IV contrast. Multiplanar reformats. Dose reduction techniques were used.    FINDINGS:  INTRACRANIAL CONTENTS: No intracranial hemorrhage, extraaxial collection, or mass effect.  No CT evidence of acute infarct. Moderate presumed chronic small vessel ischemic changes. Mild to moderate generalized volume loss. No hydrocephalus.     VISUALIZED  ORBITS/SINUSES/MASTOIDS: No intraorbital abnormality. Acute on chronic maxillary sinusitis. No middle ear or mastoid effusion.    BONES/SOFT TISSUES: No acute abnormality.      Impression    IMPRESSION:  1.  No acute intracranial process.  2.  Acute on chronic maxillary sinusitis.   Echocardiogram Complete   Result Value    LVEF  50-55%    [facility]    [id number]  [id number]  [id number]  [id number]^[place] [address]     Name: BEATRIZ COLLIER  MRN: [id number]  : 1940  Study Date: 2022 08:33 AM  Age: 81 yrs  Gender: Male  Patient Location: [facility]  Reason For Study: Syncope  Ordering Physician: LYNDA JENKINS  Performed By: ED     BSA: 1.9 m2  Height: 69 in  Weight: 163 lb  HR: 94  BP: 144/77 mmHg  ______________________________________________________________________________  ______________________________________________________________________________  Interpretation Summary     There is mild concentric left ventricular hypertrophy.  The visual ejection fraction is 50-55%.  There is moderate inferior wall hypokinesis.  There is mild (1+) mitral regurgitation.  Mild to moderate valvular aortic stenosis.  There is moderate (2+) aortic regurgitation.  There is trace to mild tricuspid regurgitation.  Compared to the prior study dated 8/3/2021, there are changes as noted. The  inferior wall appears hypokinetic on the current study. Minimal progression in  the aortic valve stenosis.  ______________________________________________________________________________  Left Ventricle  The left ventricle is normal in size. There is mild concentric left  ventricular hypertrophy. The visual ejection fraction is 50-55%. Diastolic  Doppler findings (E/E' ratio and/or other parameters) suggest left ventricular  filling pressures are indeterminate. There is moderate inferior wall  hypokinesis.     Right Ventricle  The right ventricle is normal in size and  function.     Atria  Normal left atrial size. Right atrial size is normal. Intact atrial septum.     Mitral Valve  Mitral valve leaflets appear normal. There is mild (1+) mitral regurgitation.  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     Tricuspid Valve  Tricuspid valve leaflets appear normal. There is trace to mild tricuspid  regurgitation.     Aortic Valve  Mild aortic valve calcification is present. There is moderate (2+) aortic  regurgitation. Mild to moderate valvular aortic stenosis.     Pulmonic Valve  The pulmonic valve is not well visualized. There is no pulmonic valvular  regurgitation.     Vessels  The aorta root is normal. The ascending aorta is Mildly dilated. IVC diameter  <2.1 cm collapsing >50% with sniff suggests a normal RA pressure of 3 mmHg.     Pericardium  There is no pericardial effusion.     ______________________________________________________________________________  MMode/2D Measurements & Calculations  IVSd: 1.2 cm  LVIDd: 3.9 cm  LVIDs: 2.8 cm  LVPWd: 1.2 cm  FS: 28.7 %     LV mass(C)d: 160.9 grams  LV mass(C)dI: 84.9 grams/m2  Ao root diam: 3.4 cm  LA dimension: 3.2 cm  asc Aorta Diam: 4.1 cm  LA/Ao: 0.96  LVOT diam: 2.8 cm  LVOT area: 6.1 cm2  RWT: 0.61     Time Measurements  MM HR: 93.0 BPM     Doppler Measurements & Calculations  MV E max edouard: 87.9 cm/sec  MV dec time: 0.14 sec  Ao V2 max: 292.4 cm/sec  Ao max P.2 mmHg  Ao V2 mean: 203.3 cm/sec  Ao mean P.0 mmHg  Ao V2 VTI: 59.4 cm  MARTHA(I,D): 2.2 cm2  MARTHA(V,D): 2.3 cm2  AI P1/2t: 259.5 msec  LV V1 max P.1 mmHg  LV V1 max: 113.0 cm/sec  LV V1 VTI: 21.6 cm  SV(LVOT): 130.9 ml  SI(LVOT): 69.1 ml/m2     AV Edouard Ratio (DI): 0.39  MARTHA Index (cm2/m2): 1.2  E/E' av.1  Lateral E/e': 5.2  Medial E/e': 8.9     ______________________________________________________________________________  Report approved by: Mariel Beaulieu 2022 09:53 AM              Labs:  Most Recent 3 CBC's:Recent Labs   Lab Test 02/08/22  0325 05/07/19  0614 07/06/18  0554 07/05/18  0714   WBC 8.7 8.3  --  9.3   HGB 13.6 14.2 12.2* 12.7*   MCV 95 94  --  89   * 167  --  192     Most Recent 3 BMP's:Recent Labs   Lab Test 02/08/22  0325 06/14/21  0912 12/14/20  0850    137 136   POTASSIUM 3.8 5.0 5.2*   CHLORIDE 105 102 100   CO2 26 25 26   BUN 18 18 18   CR 0.97 0.86 0.83   ANIONGAP 8 10 10   CHICHO 8.8 8.3* 8.8   GLC 94 83 89     Most Recent 2 LFT's:Recent Labs   Lab Test 06/14/21  0912 12/14/20  0850   AST 22 22   ALT 19 20   ALKPHOS 103 110   BILITOTAL 0.9 0.9     Most Recent 3 INR's:No lab results found.    Medications:   Personally Reviewed.  Medications     sodium chloride 75 mL/hr at 02/08/22 0511       aspirin  81 mg Oral Daily     atorvastatin  80 mg Oral Daily     metoprolol tartrate  25 mg Oral BID     pantoprazole  40 mg Oral Daily

## 2022-02-08 NOTE — ED TRIAGE NOTES
Pt reports syncope episode at home w/ LoC. Pt recalls striking head, has minor abrasion to bridge of nose.  Pt was walking to bathroom at time of fall.  Denies pain upon arrival.  Reports feeling well and healthy prior to fall.

## 2022-02-08 NOTE — PHARMACY-ADMISSION MEDICATION HISTORY
Pharmacy Note - Admission Medication History    Pertinent Provider Information: N/A     ______________________________________________________________________    Prior To Admission (PTA) med list completed and updated in EMR.       PTA Med List   Medication Sig Last Dose     aspirin 81 MG EC tablet [ASPIRIN 81 MG EC TABLET] Take 1 tablet (81 mg total) by mouth daily. 2/7/2022 at AM     atorvastatin (LIPITOR) 80 MG tablet [ATORVASTATIN (LIPITOR) 80 MG TABLET] Take 80 mg by mouth daily. 2/7/2022 at AM     betamethasone dipropionate (DIPROLENE) 0.05 % cream [BETAMETHASONE DIPROPIONATE (DIPROLENE) 0.05 % CREAM] Apply 1 application topically 2 (two) times a day as needed. Unknown at Unknown time     desonide (DESOWEN) 0.05 % cream [DESONIDE (DESOWEN) 0.05 % CREAM] Apply topically 2 (two) times a day as needed. Unknown at Unknown time     fluticasone (FLONASE) 50 mcg/actuation nasal spray [FLUTICASONE (FLONASE) 50 MCG/ACTUATION NASAL SPRAY] Apply 2 sprays into each nostril every evening.  2/7/2022 at PM, not bringing     glucosamine sulfate (GLUCOSAMINE) 750 mg Tab [GLUCOSAMINE SULFATE (GLUCOSAMINE) 750 MG TAB] Take 1,500 mg by mouth daily.  2/7/2022 at AM     ipratropium (ATROVENT) 0.06 % nasal spray [IPRATROPIUM (ATROVENT) 0.06 % NASAL SPRAY] Apply 2 sprays into each nostril 2 (two) times a day. 2/7/2022 at PM, not bringing     melatonin 3 mg Tab [MELATONIN 3 MG TAB] Take 6 mg by mouth bedtime.  2/7/2022 at PM     methylcellulose (CITRUCEL) Take 2 g by mouth daily as needed  Unknown at Unknown time     metoprolol tartrate (LOPRESSOR) 25 MG tablet Take 1 tablet (25 mg) by mouth 2 times daily 2/7/2022 at AM     multivitamin (MULTIVITAMIN) per tablet [MULTIVITAMIN (MULTIVITAMIN) PER TABLET] Take 1 tablet by mouth daily. 2/7/2022 at AM     nitroglycerin (NITROSTAT) 0.4 MG SL tablet [NITROGLYCERIN (NITROSTAT) 0.4 MG SL TABLET] Place 0.4 mg under the tongue every 5 (five) minutes as needed for chest pain. Unknown at Unknown  time     omeprazole (PRILOSEC) 20 MG capsule [OMEPRAZOLE (PRILOSEC) 20 MG CAPSULE] Take 20 mg by mouth daily. 2/7/2022 at AM     sodium chloride (OCEAN) 0.65 % nasal spray Spray 2 sprays into both nostrils 2 times daily Use 30 min before Atrovent spray. 2/7/2022 at PM     vitamin A-vitamin C-vit E-min (OCUVITE) Tab tablet [VITAMIN A-VITAMIN C-VIT E-MIN (OCUVITE) TAB TABLET] Take 1 tablet by mouth daily. 2/7/2022 at AM       Information source(s): Patient and CareEverywhere/SureScripts  Method of interview communication: in-person    Summary of Changes to PTA Med List  New: N/A  Discontinued: N/A  Changed: Citrucel prn    Patient was asked about OTC/herbal products specifically.  PTA med list reflects this.    In the past week, patient estimated taking medication this percent of the time:  greater than 90%.    Allergies were reviewed, assessed, and updated with the patient.      Patient did not bring any medications to the hospital and can't retrieve from home. No multi-dose medications are available for use during hospital stay.     The information provided in this note is only as accurate as the sources available at the time of the update(s).    Thank you for the opportunity to participate in the care of this patient.    Gian Spring [facility]  2/8/2022 7:53 AM

## 2022-02-08 NOTE — PLAN OF CARE
PRIMARY DIAGNOSIS: SYNCOPE  OUTPATIENT/OBSERVATION GOALS TO BE MET BEFORE DISCHARGE:  1. Orthostatic performed: Yes:          Lying Orthostatic BP: (S) 151/76         Sitting Orthostatic BP: (S) 148/82         Standing Orthostatic BP: (S) 149/82     2. Diagnostic testing complete & at baseline neurologic testing: No. Stress test tomorrow. Neuros intact.    3. Cleared by consultants (if involved): No, see above (cardiology)    4. Interpretation of cardiac rhythm per telemetry tech: SR with first degree AV block    5. Tolerating adequate PO diet and medications: Yes    6. Return to near baseline physical activity or neurologic status: Yes    Discharge Planner Nurse   Safe discharge environment identified: Yes  Barriers to discharge: Yes       Entered by: Gillian Stern 02/08/2022 5:06 PM

## 2022-02-08 NOTE — ED NOTES
Writer gave patient water, after giving an update to his wife.   He is giving her a call now.   Updated patient on care plan, awaiting bed. Call light in reach

## 2022-02-09 ENCOUNTER — APPOINTMENT (OUTPATIENT)
Dept: NUCLEAR MEDICINE | Facility: CLINIC | Age: 82
End: 2022-02-09
Attending: INTERNAL MEDICINE
Payer: MEDICARE

## 2022-02-09 ENCOUNTER — APPOINTMENT (OUTPATIENT)
Dept: CARDIOLOGY | Facility: CLINIC | Age: 82
End: 2022-02-09
Attending: INTERNAL MEDICINE
Payer: MEDICARE

## 2022-02-09 VITALS
OXYGEN SATURATION: 97 % | WEIGHT: 160.5 LBS | HEIGHT: 69 IN | HEART RATE: 61 BPM | SYSTOLIC BLOOD PRESSURE: 164 MMHG | BODY MASS INDEX: 23.77 KG/M2 | RESPIRATION RATE: 18 BRPM | DIASTOLIC BLOOD PRESSURE: 75 MMHG | TEMPERATURE: 97.4 F

## 2022-02-09 LAB
ALBUMIN SERPL-MCNC: 3.3 G/DL (ref 3.5–5)
ALP SERPL-CCNC: 95 U/L (ref 45–120)
ALT SERPL W P-5'-P-CCNC: 16 U/L (ref 0–45)
ANION GAP SERPL CALCULATED.3IONS-SCNC: 8 MMOL/L (ref 5–18)
AST SERPL W P-5'-P-CCNC: 21 U/L (ref 0–40)
BASOPHILS # BLD AUTO: 0 10E3/UL (ref 0–0.2)
BASOPHILS NFR BLD AUTO: 1 %
BILIRUB SERPL-MCNC: 1 MG/DL (ref 0–1)
BUN SERPL-MCNC: 17 MG/DL (ref 8–28)
CALCIUM SERPL-MCNC: 8.7 MG/DL (ref 8.5–10.5)
CHLORIDE BLD-SCNC: 105 MMOL/L (ref 98–107)
CO2 SERPL-SCNC: 25 MMOL/L (ref 22–31)
CREAT SERPL-MCNC: 0.93 MG/DL (ref 0.7–1.3)
CV STRESS CURRENT BP HE: NORMAL
CV STRESS CURRENT HR HE: 60
CV STRESS CURRENT HR HE: 60
CV STRESS CURRENT HR HE: 66
CV STRESS CURRENT HR HE: 66
CV STRESS CURRENT HR HE: 69
CV STRESS CURRENT HR HE: 71
CV STRESS CURRENT HR HE: 73
CV STRESS CURRENT HR HE: 74
CV STRESS CURRENT HR HE: 74
CV STRESS CURRENT HR HE: 79
CV STRESS CURRENT HR HE: 80
CV STRESS CURRENT HR HE: 80
CV STRESS CURRENT HR HE: 81
CV STRESS CURRENT HR HE: 82
CV STRESS CURRENT HR HE: 84
CV STRESS CURRENT HR HE: 84
CV STRESS DEVIATION TIME HE: NORMAL
CV STRESS ECHO PERCENT HR HE: NORMAL
CV STRESS EXERCISE STAGE HE: NORMAL
CV STRESS FINAL RESTING BP HE: NORMAL
CV STRESS FINAL RESTING HR HE: 66
CV STRESS MAX HR HE: 84
CV STRESS MAX TREADMILL GRADE HE: 0
CV STRESS MAX TREADMILL SPEED HE: 0
CV STRESS PEAK DIA BP HE: NORMAL
CV STRESS PEAK SYS BP HE: NORMAL
CV STRESS PHASE HE: NORMAL
CV STRESS PROTOCOL HE: NORMAL
CV STRESS RESTING PT POSITION HE: NORMAL
CV STRESS ST DEVIATION AMOUNT HE: NORMAL
CV STRESS ST DEVIATION ELEVATION HE: NORMAL
CV STRESS ST EVELATION AMOUNT HE: NORMAL
CV STRESS TEST TYPE HE: NORMAL
CV STRESS TOTAL STAGE TIME MIN 1 HE: NORMAL
EOSINOPHIL # BLD AUTO: 0.3 10E3/UL (ref 0–0.7)
EOSINOPHIL NFR BLD AUTO: 4 %
ERYTHROCYTE [DISTWIDTH] IN BLOOD BY AUTOMATED COUNT: 13.4 % (ref 10–15)
GFR SERPL CREATININE-BSD FRML MDRD: 81 ML/MIN/1.73M2
GLUCOSE BLD-MCNC: 84 MG/DL (ref 70–125)
HCT VFR BLD AUTO: 41.1 % (ref 40–53)
HGB BLD-MCNC: 13.2 G/DL (ref 13.3–17.7)
IMM GRANULOCYTES # BLD: 0 10E3/UL
IMM GRANULOCYTES NFR BLD: 0 %
LYMPHOCYTES # BLD AUTO: 1.8 10E3/UL (ref 0.8–5.3)
LYMPHOCYTES NFR BLD AUTO: 23 %
MAGNESIUM SERPL-MCNC: 1.9 MG/DL (ref 1.8–2.6)
MCH RBC QN AUTO: 30.2 PG (ref 26.5–33)
MCHC RBC AUTO-ENTMCNC: 32.1 G/DL (ref 31.5–36.5)
MCV RBC AUTO: 94 FL (ref 78–100)
MONOCYTES # BLD AUTO: 0.9 10E3/UL (ref 0–1.3)
MONOCYTES NFR BLD AUTO: 12 %
NEUTROPHILS # BLD AUTO: 4.5 10E3/UL (ref 1.6–8.3)
NEUTROPHILS NFR BLD AUTO: 60 %
NRBC # BLD AUTO: 0 10E3/UL
NRBC BLD AUTO-RTO: 0 /100
NUC STRESS EJECTION FRACTION: 60 %
PLATELET # BLD AUTO: 134 10E3/UL (ref 150–450)
POTASSIUM BLD-SCNC: 4.5 MMOL/L (ref 3.5–5)
PROT SERPL-MCNC: 6.2 G/DL (ref 6–8)
RATE PRESSURE PRODUCT: NORMAL
RBC # BLD AUTO: 4.37 10E6/UL (ref 4.4–5.9)
SODIUM SERPL-SCNC: 138 MMOL/L (ref 136–145)
STRESS ECHO BASELINE DIASTOLIC HE: 75
STRESS ECHO BASELINE HR: 62
STRESS ECHO BASELINE SYSTOLIC BP: 140
STRESS ECHO CALCULATED PERCENT HR: 61 %
STRESS ECHO LAST STRESS DIASTOLIC BP: 66
STRESS ECHO LAST STRESS HR: 80
STRESS ECHO LAST STRESS SYSTOLIC BP: 120
STRESS ECHO TARGET HR: 137
WBC # BLD AUTO: 7.6 10E3/UL (ref 4–11)

## 2022-02-09 PROCEDURE — 85004 AUTOMATED DIFF WBC COUNT: CPT | Performed by: INTERNAL MEDICINE

## 2022-02-09 PROCEDURE — 93270 REMOTE 30 DAY ECG REV/REPORT: CPT

## 2022-02-09 PROCEDURE — 36415 COLL VENOUS BLD VENIPUNCTURE: CPT | Performed by: INTERNAL MEDICINE

## 2022-02-09 PROCEDURE — 343N000001 HC RX 343: Performed by: INTERNAL MEDICINE

## 2022-02-09 PROCEDURE — 250N000013 HC RX MED GY IP 250 OP 250 PS 637: Performed by: FAMILY MEDICINE

## 2022-02-09 PROCEDURE — G0378 HOSPITAL OBSERVATION PER HR: HCPCS

## 2022-02-09 PROCEDURE — 80053 COMPREHEN METABOLIC PANEL: CPT | Performed by: INTERNAL MEDICINE

## 2022-02-09 PROCEDURE — 93018 CV STRESS TEST I&R ONLY: CPT | Performed by: INTERNAL MEDICINE

## 2022-02-09 PROCEDURE — 250N000011 HC RX IP 250 OP 636: Performed by: INTERNAL MEDICINE

## 2022-02-09 PROCEDURE — 93016 CV STRESS TEST SUPVJ ONLY: CPT | Performed by: INTERNAL MEDICINE

## 2022-02-09 PROCEDURE — 93017 CV STRESS TEST TRACING ONLY: CPT | Performed by: INTERNAL MEDICINE

## 2022-02-09 PROCEDURE — 99214 OFFICE O/P EST MOD 30 MIN: CPT | Mod: 25 | Performed by: INTERNAL MEDICINE

## 2022-02-09 PROCEDURE — 78452 HT MUSCLE IMAGE SPECT MULT: CPT | Mod: 26 | Performed by: INTERNAL MEDICINE

## 2022-02-09 PROCEDURE — A9500 TC99M SESTAMIBI: HCPCS | Performed by: INTERNAL MEDICINE

## 2022-02-09 PROCEDURE — 99217 PR OBSERVATION CARE DISCHARGE: CPT | Performed by: INTERNAL MEDICINE

## 2022-02-09 PROCEDURE — 78452 HT MUSCLE IMAGE SPECT MULT: CPT

## 2022-02-09 PROCEDURE — 83735 ASSAY OF MAGNESIUM: CPT | Performed by: FAMILY MEDICINE

## 2022-02-09 RX ORDER — REGADENOSON 0.08 MG/ML
0.4 INJECTION, SOLUTION INTRAVENOUS ONCE
Status: COMPLETED | OUTPATIENT
Start: 2022-02-09 | End: 2022-02-09

## 2022-02-09 RX ORDER — ALBUTEROL SULFATE 0.83 MG/ML
2.5 SOLUTION RESPIRATORY (INHALATION)
Status: DISCONTINUED | OUTPATIENT
Start: 2022-02-09 | End: 2022-02-09 | Stop reason: CLARIF

## 2022-02-09 RX ORDER — CAFFEINE 200 MG
200 TABLET ORAL
Status: DISCONTINUED | OUTPATIENT
Start: 2022-02-09 | End: 2022-02-09 | Stop reason: CLARIF

## 2022-02-09 RX ORDER — CAFFEINE CITRATE 20 MG/ML
60 SOLUTION INTRAVENOUS
Status: DISCONTINUED | OUTPATIENT
Start: 2022-02-09 | End: 2022-02-09 | Stop reason: CLARIF

## 2022-02-09 RX ORDER — AMINOPHYLLINE 25 MG/ML
50 INJECTION, SOLUTION INTRAVENOUS
Status: DISCONTINUED | OUTPATIENT
Start: 2022-02-09 | End: 2022-02-09 | Stop reason: CLARIF

## 2022-02-09 RX ADMIN — MAGNESIUM OXIDE TAB 400 MG (241.3 MG ELEMENTAL MG) 200 MG: 400 (241.3 MG) TAB at 09:03

## 2022-02-09 RX ADMIN — REGADENOSON 0.4 MG: 0.08 INJECTION, SOLUTION INTRAVENOUS at 11:26

## 2022-02-09 RX ADMIN — ASPIRIN 81 MG: 81 TABLET, COATED ORAL at 09:03

## 2022-02-09 RX ADMIN — METOPROLOL TARTRATE 25 MG: 25 TABLET, FILM COATED ORAL at 09:03

## 2022-02-09 RX ADMIN — AMINOPHYLLINE 50 MG: 25 INJECTION, SOLUTION INTRAVENOUS at 11:39

## 2022-02-09 RX ADMIN — Medication 8.53 MILLICURIE: at 10:45

## 2022-02-09 RX ADMIN — Medication 31.7 MILLICURIE: at 11:30

## 2022-02-09 RX ADMIN — PANTOPRAZOLE SODIUM 40 MG: 20 TABLET, DELAYED RELEASE ORAL at 09:03

## 2022-02-09 RX ADMIN — ATORVASTATIN CALCIUM 80 MG: 40 TABLET, FILM COATED ORAL at 09:03

## 2022-02-09 ASSESSMENT — MIFFLIN-ST. JEOR: SCORE: 1413.4

## 2022-02-09 NOTE — PROGRESS NOTES
Cardiology Progress Note  Primary cardiologist Dr Crabtree  Assessment:    Patient with a history of coronary artery disease with myocardial infarction in 1997 and at that time underwent two-vessel bypass with reportedly a small graft to the LAD and ESPERANZA graft to the RCA.  He underwent repeat coronary angiography July 2018 with successful PCI of the distal right coronary artery via ESPERANZA graft with placement of 3 drug-eluting stents.  He saw Dr. Crabtree July 2021 reported no anginal symptoms at that time.  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  Dr. Crabtree's note July 2021 indicated no recurrence.  An echocardiogram July 2021 was reported normal systolic function with normal wall motion, mild aortic stenosis and mild aortic insufficiency.  Echocardiogram interpreted by  today for the EF of 50 to 55% with inferior wall hypokinesis and mild to moderate valvular aortic stenosis.  It was felt that the inferior wall was hypokinetic on today's study compared to previous study per the report.  1.  Syncope after getting up to use the bathroom.  Findings sound most consistent with orthostatic/vagal based on the description of feeling warm which came on suddenly.  He does have significant cardiovascular history that includes coronary artery disease, SVT with prior ablation x2.  Telemetry overnight sinus rhythm with mild sinus bradycardia.      2.  Coronary artery disease.  Patient denies chest pain or significant change in exercise tolerance.  Echocardiogram as described this admission with more prominent inferior wall motion abnormality per the report.  Troponins negative this admission.  Patient did undergo PTCA 2018 of the distal right coronary artery via the ESPERANZA with placement of three drug-eluting stents at that time.  He is not experiencing angina.  He reports some  "mild decrement in exercise tolerance.    3.  Hypertension.  Blood pressure has trended elevated here in the hospital.  He is on metoprolol.  Blood pressure when he saw Dr. Crabtree was 118/58.  Would avoid additional antihypertensive medications for now and monitoring given recent syncopal event.  Active Problems:    Syncope, unspecified syncope type      Plan:  1.  Lexiscan nuclear stress test today  2.  Would favor event monitor at discharge if stress test is negative.    Subjective:   Damian Butler is seen in follow-up.  He reports that he has been feeling well overnight.  No chest pain or shortness of breath no additional dizziness.  He is only been up to use the urinal however.  We talked about the plans for today and recommendations for follow-up.  I did encourage him to consider curtailing driving until we have additional information regarding this event.    Objective:   Vital signs in last 24 hours:  VITALS: BP (!) 160/74 (BP Location: Left arm)   Pulse 62   Temp 98.3  F (36.8  C) (Oral)   Resp 16   Ht 1.753 m (5' 9\")   Wt 72.8 kg (160 lb 8 oz)   SpO2 91%   BMI 23.70 kg/m    BMI: Body mass index is 23.7 kg/m .  Wt Readings from Last 3 Encounters:   02/09/22 72.8 kg (160 lb 8 oz)   07/29/21 76.2 kg (168 lb)   07/08/19 75.8 kg (167 lb 1 oz)       Intake/Output Summary (Last 24 hours) at 2/9/2022 0603  Last data filed at 2/9/2022 0343  Gross per 24 hour   Intake 1240 ml   Output 1350 ml   Net -110 ml       Physical Exam: Resting comfortably in bed in no acute distress, alert and oriented asks appropriate questions   Neck: No JVD, supple   Lungs: clear to auscultation, mildly diminished   COR: RRR, 2/6 systolic murmur in the sash-like distribution   Abd: nondistended, BS present, nontender   Extrem: No edema, palpable pulses, warm  Neuro: Alert and oriented no focal neurologic findings.    Cardiographics:      ECG: EKG February 8 0300 hrs. sinus rhythm with first-degree AV block, nonspecific ST-T " changes, cannot exclude inferior infarct no significant change from 2018  Name: BEATRIZ COLLIER  MRN: [id number]  : 1940  Study Date: 2022 08:33 AM  Age: 81 yrs  Gender: Male  Patient [facility]: [facility]  Reason For Study: Syncope  Ordering Physician: LYNDA JENKINS  Performed By: ED     BSA: 1.9 m2  Height: 69 in  Weight: 163 lb  HR: 94  BP: 144/77 mmHg  ______________________________________________________________________________  ______________________________________________________________________________  Interpretation Summary     There is mild concentric left ventricular hypertrophy.  The visual ejection fraction is 50-55%.  There is moderate inferior wall hypokinesis.  There is mild (1+) mitral regurgitation.  Mild to moderate valvular aortic stenosis.  There is moderate (2+) aortic regurgitation.  There is trace to mild tricuspid regurgitation.  Compared to the prior study dated 8/3/2021, there are changes as noted. The  inferior wall appears hypokinetic on the current study. Minimal progression in  the aortic valve stenosis.  ______________________________________________________________________________  Left Ventricle  The left ventricle is normal in size. There is mild concentric left  ventricular hypertrophy. The visual ejection fraction is 50-55%. Diastolic  Doppler findings (E/E' ratio and/or other parameters) suggest left ventricular  filling pressures are indeterminate. There is moderate inferior wall  hypokinesis.     Right Ventricle  The right ventricle is normal in size and function.     Atria  Normal left atrial size. Right atrial size is normal. Intact atrial septum.     Mitral Valve  Mitral valve leaflets appear normal. There is mild (1+) mitral regurgitation.  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     Tricuspid Valve  Tricuspid valve leaflets appear normal. There is trace to mild  tricuspid  regurgitation.     Aortic Valve  Mild aortic valve calcification is present. There is moderate (2+) aortic  regurgitation. Mild to moderate valvular aortic stenosis.     Pulmonic Valve  The pulmonic valve is not well visualized. There is no pulmonic valvular  regurgitation.     Vessels  The aorta root is normal. The ascending aorta is Mildly dilated. IVC diameter  <2.1 cm collapsing >50% with sniff suggests a normal RA pressure of 3 mmHg.     Pericardium  There is no pericardial effusion  Telemetry: Sinus rhythm with some trend toward sinus bradycardia no significant ectopy.    Imaging:     EXAM: CT HEAD W/O CONTRAST  LOCATION: [facility]  DATE/TIME: 2/8/2022 3:32 AM     INDICATION: Head trauma, minor (Age >= 65y)  COMPARISON: None.  TECHNIQUE: Routine CT Head without IV contrast. Multiplanar reformats. Dose reduction techniques were used.     FINDINGS:  INTRACRANIAL CONTENTS: No intracranial hemorrhage, extraaxial collection, or mass effect.  No CT evidence of acute infarct. Moderate presumed chronic small vessel ischemic changes. Mild to moderate generalized volume loss. No hydrocephalus.      VISUALIZED ORBITS/SINUSES/MASTOIDS: No intraorbital abnormality. Acute on chronic maxillary sinusitis. No middle ear or mastoid effusion.     BONES/SOFT TISSUES: No acute abnormality.                                                                      IMPRESSION:  1.  No acute intracranial process.  2.  Acute on chronic maxillary sinusitis.     Lab Results    Chemistry/lipid CBC Cardiac Enzymes/BNP/TSH/INR   Recent Labs   Lab Test 06/14/21  0912   CHOL 114   HDL 39*   LDL 65   TRIG 52     Recent Labs   Lab Test 06/14/21  0912 12/14/20  0850 12/12/19  0830   LDL 65 64 57     Recent Labs   Lab Test 02/09/22  0419      POTASSIUM 4.5   CHLORIDE 105   CO2 25   GLC 84   BUN 17   CR 0.93   GFRESTIMATED 81   CHICHO 8.7     Recent Labs   Lab Test 02/09/22  0419 02/08/22  0325 06/14/21  0912    CR 0.93 0.97 0.86     No results for input(s): A1C in the last 47555 hours.       Recent Labs   Lab Test 02/09/22  0419   WBC 7.6   HGB 13.2*   HCT 41.1   MCV 94   *     Recent Labs   Lab Test 02/09/22  0419 02/08/22  0325 05/07/19  0614   HGB 13.2* 13.6 14.2    Recent Labs   Lab Test 02/08/22  1213 02/08/22  0820 02/08/22  0325   TROPONINI 0.03 0.03 0.01     No results for input(s): BNP, NTBNPI, NTBNP in the last 43534 hours.  No results for input(s): TSH in the last 22466 hours.  No results for input(s): INR in the last 87708 hours.

## 2022-02-09 NOTE — DISCHARGE SUMMARY
[facility]  Hospitalist Discharge Summary      Date of Admission:  2/8/2022  Date of Discharge:  2/9/2022  Discharging Provider: Zoraida Abarca MD, MHA  Discharge Service: Hospitalist Service    Discharge Diagnoses   Syncope of unclear etiology  Hypertension adequate control  Nonischemic wall motion abnormality on cardiac echogram    Follow-ups Needed After Discharge   Event monitor to be followed up by cardiology        Discharge Disposition   Discharged to home  Condition at discharge: Stable      Hospital Course    81 year old male with an extensive cardiac history including CABG, stenting x3, and ablations x2 presents with syncope at home.  New wall motion abnormality noted on echo.  No arrhythmias on telemetry since admission.  Underwent nuclear medicine stress test which was not indicative of any kind of ischemia.  Cardiology recommended placement of an event monitor which is being done prior to discharge.  Patient will follow up as an outpatient with Dr. Crabtree and his primary care provider    Consultations This Hospital Stay   SOCIAL WORK IP CONSULT  CARDIOLOGY IP CONSULT    Code Status   Full Code    Time Spent on this Encounter   I, Zoraida Abarca MD, BRY, personally saw the patient today and spent greater than 30 minutes discharging this patient.       Zoraida Abarca MD, KATE  [facility]  Critical access hospital[address] [facility] [address]  Phone: [phone]  Fax: [phone]  ______________________________________________________________________  General Appearance: Alert awake oriented x3 no acute distress  Respiratory: CTA  Cardiovascular: S1-S2 regular rate and rhythm  GI: Nontender nondistended bowel sounds are present  Skin: No rash or lesions  Other: No neurological deficits  Physical Exam   Vital Signs: Temp: 97.8  F (36.6  C) Temp src: Oral BP: (!) 155/73 Pulse: 65   Resp: 17 SpO2: 93 % O2 Device: None (Room air)    Weight: 160 lbs 8  oz         Primary Care Physician   Jose Hare    Discharge Orders      Reason for your hospital stay    Syncope     Follow-up and recommended labs and tests     Follow up with primary care provider, Jose Hare, within 7 days for hospital follow- up.  No follow up labs or test are needed.     Activity    Your activity upon discharge: activity as tolerated and no lifting, driving, or strenuous exercise for 3 days     Cardiac Mobile Telemetry Monitor     Diet    Follow this diet upon discharge: Orders Placed This Encounter      Combination Diet No Caffeine Diet, Low Saturated Fat Na <2400mg Diet       Significant Results and Procedures   Most Recent 3 CBC's:Recent Labs   Lab Test 02/09/22  0419 02/08/22  0325 05/07/19  0614   WBC 7.6 8.7 8.3   HGB 13.2* 13.6 14.2   MCV 94 95 94   * 134* 167     Most Recent 3 BMP's:Recent Labs   Lab Test 02/09/22  0419 02/08/22  0325 06/14/21  0912    139 137   POTASSIUM 4.5 3.8 5.0   CHLORIDE 105 105 102   CO2 25 26 25   BUN 17 18 18   CR 0.93 0.97 0.86   ANIONGAP 8 8 10   CHICHO 8.7 8.8 8.3*   GLC 84 94 83     Most Recent 2 LFT's:Recent Labs   Lab Test 02/09/22  0419 06/14/21  0912   AST 21 22   ALT 16 19   ALKPHOS 95 103   BILITOTAL 1.0 0.9   ,   Results for orders placed or performed during the hospital encounter of 02/08/22   CT Head w/o Contrast    Narrative    EXAM: CT HEAD W/O CONTRAST  LOCATION: [facility]  DATE/TIME: 2/8/2022 3:32 AM    INDICATION: Head trauma, minor (Age >= 65y)  COMPARISON: None.  TECHNIQUE: Routine CT Head without IV contrast. Multiplanar reformats. Dose reduction techniques were used.    FINDINGS:  INTRACRANIAL CONTENTS: No intracranial hemorrhage, extraaxial collection, or mass effect.  No CT evidence of acute infarct. Moderate presumed chronic small vessel ischemic changes. Mild to moderate generalized volume loss. No hydrocephalus.     VISUALIZED ORBITS/SINUSES/MASTOIDS: No intraorbital abnormality. Acute  on chronic maxillary sinusitis. No middle ear or mastoid effusion.    BONES/SOFT TISSUES: No acute abnormality.      Impression    IMPRESSION:  1.  No acute intracranial process.  2.  Acute on chronic maxillary sinusitis.   Echocardiogram Complete     Value    LVEF  50-55%    [facility]    [id number]  [id number]  [id number]  [id number]^GREGORY^[place] [address]     Name: BEATRIZ COLLIER  MRN: [id number]  : 1940  Study Date: 2022 08:33 AM  Age: 81 yrs  Gender: Male  Patient Location: [facility]  Reason For Study: Syncope  Ordering Physician: LYNDA JENKINS  Performed By: ED     BSA: 1.9 m2  Height: 69 in  Weight: 163 lb  HR: 94  BP: 144/77 mmHg  ______________________________________________________________________________  ______________________________________________________________________________  Interpretation Summary     There is mild concentric left ventricular hypertrophy.  The visual ejection fraction is 50-55%.  There is moderate inferior wall hypokinesis.  There is mild (1+) mitral regurgitation.  Mild to moderate valvular aortic stenosis.  There is moderate (2+) aortic regurgitation.  There is trace to mild tricuspid regurgitation.  Compared to the prior study dated 8/3/2021, there are changes as noted. The  inferior wall appears hypokinetic on the current study. Minimal progression in  the aortic valve stenosis.  ______________________________________________________________________________  Left Ventricle  The left ventricle is normal in size. There is mild concentric left  ventricular hypertrophy. The visual ejection fraction is 50-55%. Diastolic  Doppler findings (E/E' ratio and/or other parameters) suggest left ventricular  filling pressures are indeterminate. There is moderate inferior wall  hypokinesis.     Right Ventricle  The right ventricle is normal in size and function.     Atria  Normal left atrial size. Right atrial size is normal.  Intact atrial septum.     Mitral Valve  Mitral valve leaflets appear normal. There is mild (1+) mitral regurgitation.  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     Tricuspid Valve  Tricuspid valve leaflets appear normal. There is trace to mild tricuspid  regurgitation.     Aortic Valve  Mild aortic valve calcification is present. There is moderate (2+) aortic  regurgitation. Mild to moderate valvular aortic stenosis.     Pulmonic Valve  The pulmonic valve is not well visualized. There is no pulmonic valvular  regurgitation.     Vessels  The aorta root is normal. The ascending aorta is Mildly dilated. IVC diameter  <2.1 cm collapsing >50% with sniff suggests a normal RA pressure of 3 mmHg.     Pericardium  There is no pericardial effusion.     ______________________________________________________________________________  MMode/2D Measurements & Calculations  IVSd: 1.2 cm  LVIDd: 3.9 cm  LVIDs: 2.8 cm  LVPWd: 1.2 cm  FS: 28.7 %     LV mass(C)d: 160.9 grams  LV mass(C)dI: 84.9 grams/m2  Ao root diam: 3.4 cm  LA dimension: 3.2 cm  asc Aorta Diam: 4.1 cm  LA/Ao: 0.96  LVOT diam: 2.8 cm  LVOT area: 6.1 cm2  RWT: 0.61     Time Measurements  MM HR: 93.0 BPM     Doppler Measurements & Calculations  MV E max edouard: 87.9 cm/sec  MV dec time: 0.14 sec  Ao V2 max: 292.4 cm/sec  Ao max P.2 mmHg  Ao V2 mean: 203.3 cm/sec  Ao mean P.0 mmHg  Ao V2 VTI: 59.4 cm  MARTHA(I,D): 2.2 cm2  MARTHA(V,D): 2.3 cm2  AI P1/2t: 259.5 msec  LV V1 max P.1 mmHg  LV V1 max: 113.0 cm/sec  LV V1 VTI: 21.6 cm  SV(LVOT): 130.9 ml  SI(LVOT): 69.1 ml/m2     AV Edouard Ratio (DI): 0.39  MARTHA Index (cm2/m2): 1.2  E/E' av.1  Lateral E/e': 5.2  Medial E/e': 8.9     ______________________________________________________________________________  Report approved by: Mariel Beaulieu 2022 09:53 AM         NM Lexiscan stress test     Value    Pharmacologic Protocol Lexiscan    Test Type  Pharmacological    Baseline HR 62    Baseline Systolic     Baseline Diastolic BP 75    Last Stress HR 80    Last Stress Systolic     Last Stress Diastolic BP 66    Target     PERCENT HR 85%    ST Deviation Elevation aVL 0.1mm    Deviation Time V2 -0.4mm    ST Elevation Amount V3 1.1mm    ST Deviation Amount he III -0.5mm    Final Resting /70    Final Resting HR 66    Max Treadmill Speed 0.0    Max Treadmill Grade 0.0    Peak Systolic /70    Peak Diastolic /70    Max HR  84    Stress Phase Resting    Stress Resting Pt Position MANUAL EVENT    Current HR 71    Current /68    Stress Phase Stress    Stage Minute EXE 00:00    Exercise Stage STAGE 2    Current HR 60    Current /75    Stress Phase Stress    Stage Minute EXE 01:00    Exercise Stage STAGE 3    Current HR 60    Current /75    Stress Phase Stress    Stage Minute EXE 01:58    Exercise Stage STAGE 3    Current HR 84    Current /70    Stress Phase Stress    Stage Minute EXE 02:00    Exercise Stage STAGE 4    Current HR 84    Current /70    Stress Phase Stress    Stage Minute EXE 03:00    Exercise Stage STAGE 5    Current HR 82    Current /70    Stress Phase Stress    Stage Minute EXE 03:13    Exercise Stage STAGE 5    Current HR 81    Current /66    Stress Phase Stress    Stage Minute EXE 04:00    Exercise Stage STAGE 6    Current HR 80    Current /66    Stress Phase Stress    Stage Minute EXE 04:00    Exercise Stage STAGE 6    Current HR 80    Current /66    Stress Phase Recovery    Stage Minute REC 00:53    Exercise Stage Recovery    Current HR 79    Current /66    Stress Phase Recovery    Stage Minute REC 00:59    Exercise Stage Recovery    Current HR 73    Current /66    Stress Phase Recovery    Stage Minute REC 01:56    Exercise Stage Recovery    Current HR 74    Current /68    Stress Phase Recovery    Stage Minute REC 01:59    Exercise Stage Recovery     Current HR 74    Current /68    Stress Phase Recovery    Stage Minute REC 02:59    Exercise Stage Recovery    Current HR 71    Current /68    Stress Phase Recovery    Stage Minute REC 03:04    Exercise Stage Recovery    Current HR 71    Current /68    Stress Phase Recovery    Stage Minute REC 03:27    Exercise Stage Recovery    Current HR 69    Current /70    Stress Phase Recovery    Stage Minute REC 03:59    Exercise Stage Recovery    Current HR 66    Current /70    Stress Phase Recovery    Stage Minute REC 04:01    Exercise Stage Recovery    Current HR 66    Current /70    Max Predicted HR  61    Rate Pressure Product 10,080.0    Left Ventricular EF 60    Narrative       Lexiscan stress ECG negative for ischemia     The nuclear stress test is abnormal.     Nuclear images demonstrate a large area of nontransmural infarction   involving the inferior to inferolateral wall.  No ischemia noted.     The left ventricular ejection fraction at stress is 60% with   inferolateral hypokinesis.     A prior study was conducted on 6/22/2018.  Inferolateral ischemia is   not identified on the current study.     The findings of this examination were communicated to Dr. Mei.           Discharge Medications   Current Discharge Medication List      CONTINUE these medications which have NOT CHANGED    Details   aspirin 81 MG EC tablet [ASPIRIN 81 MG EC TABLET] Take 1 tablet (81 mg total) by mouth daily.    Associated Diagnoses: CAD (coronary artery disease)      atorvastatin (LIPITOR) 80 MG tablet [ATORVASTATIN (LIPITOR) 80 MG TABLET] Take 80 mg by mouth daily.      betamethasone dipropionate (DIPROLENE) 0.05 % cream [BETAMETHASONE DIPROPIONATE (DIPROLENE) 0.05 % CREAM] Apply 1 application topically 2 (two) times a day as needed.      desonide (DESOWEN) 0.05 % cream [DESONIDE (DESOWEN) 0.05 % CREAM] Apply topically 2 (two) times a day as needed.      fluticasone (FLONASE) 50 mcg/actuation nasal  spray [FLUTICASONE (FLONASE) 50 MCG/ACTUATION NASAL SPRAY] Apply 2 sprays into each nostril every evening.       glucosamine sulfate (GLUCOSAMINE) 750 mg Tab [GLUCOSAMINE SULFATE (GLUCOSAMINE) 750 MG TAB] Take 1,500 mg by mouth daily.       ipratropium (ATROVENT) 0.06 % nasal spray [IPRATROPIUM (ATROVENT) 0.06 % NASAL SPRAY] Apply 2 sprays into each nostril 2 (two) times a day.      melatonin 3 mg Tab [MELATONIN 3 MG TAB] Take 6 mg by mouth bedtime.       methylcellulose (CITRUCEL) Take 2 g by mouth daily as needed       metoprolol tartrate (LOPRESSOR) 25 MG tablet Take 1 tablet (25 mg) by mouth 2 times daily  Qty: 180 tablet, Refills: 1    Associated Diagnoses: Hypertension      multivitamin (MULTIVITAMIN) per tablet [MULTIVITAMIN (MULTIVITAMIN) PER TABLET] Take 1 tablet by mouth daily.      nitroglycerin (NITROSTAT) 0.4 MG SL tablet [NITROGLYCERIN (NITROSTAT) 0.4 MG SL TABLET] Place 0.4 mg under the tongue every 5 (five) minutes as needed for chest pain.      omeprazole (PRILOSEC) 20 MG capsule [OMEPRAZOLE (PRILOSEC) 20 MG CAPSULE] Take 20 mg by mouth daily.      sodium chloride (OCEAN) 0.65 % nasal spray Spray 2 sprays into both nostrils 2 times daily Use 30 min before Atrovent spray.      vitamin A-vitamin C-vit E-min (OCUVITE) Tab tablet [VITAMIN A-VITAMIN C-VIT E-MIN (OCUVITE) TAB TABLET] Take 1 tablet by mouth daily.           Allergies   Allergies   Allergen Reactions     Amiodarone Other (See Comments)     Fatigue, shaking     Amiodarone Analogues [Amiodarone] Muscle Pain (Myalgia)

## 2022-02-09 NOTE — PROGRESS NOTES
Nuclear Pharmacological Stress Test:  Sitting Protocol. Lexiscan 0.4mg IV administered over 15sec. Peak VS: HR= 84, BP= 120/66. No symptoms of CP produced. Patient felt typical vasodilator side effects from Lexiscan including nausea so Aminophylline 50mg IV administered to patient to reverse side effects. No further dietary restrictions associated with stress test. Results pending cardiology interpretation.  Lea Emerson RN  [facility]

## 2022-02-09 NOTE — PROGRESS NOTES
Care Management Discharge Note    Discharge Date: 02/09/2022       Discharge Disposition: Home    Discharge Services: None    Discharge DME:  Per therapy    Discharge Transportation: family or friend will provide    Private pay costs discussed: Not applicable    Education Provided on the Discharge Plan:  Not applicable  Persons Notified of Discharge Plans: Not applicable  Patient/Family in Agreement with the Plan: yes    Handoff Referral Completed: Not applicable    Additional Information:  Chart reviewed. Pt lives in [facility] with spouse Herminia. Pt to discharge home today. Family to transport. No further CM needs desired or anticipated.      EVA Andrade Intern

## 2022-02-09 NOTE — PLAN OF CARE
Damian is doing well today. Up SBA in his room. Tele: NSR. Vitally stable. Stress test completed.    Plan to discharge home this evening. All discharge instructions were reviewed with the patient and his wife. They both indicated understanding and all questions were addressed and answered.

## 2022-02-09 NOTE — PLAN OF CARE
"  Problem: Adult Inpatient Plan of Care  Goal: Plan of Care Review  Outcome: Improving  Pt denies pain, took scheduled medication and sleeping between care. Pt will be NPO at 4 am for stress test tomorrow.    BP (!) 152/77   Pulse 77   Temp 97.8  F (36.6  C) (Oral)   Resp 18   Ht 1.753 m (5' 9\")   Wt 72.9 kg (160 lb 11.2 oz)   SpO2 93%   BMI 23.73 kg/m      "

## 2022-02-09 NOTE — PROGRESS NOTES
Cardiology follow-up.  Results of nuclear stress test reviewed with both the hospitalist physician  and his primary cardiologist Dr. Crabtree.  Given lack of ischemia and lack of angina we will plan to continue with medical management.  In addition he is going to be discharged with an event monitor and we can arrange follow-up in the office pending the results of the event monitor.  Cardiac medications t will continue include aspirin, atorvastatin, metoprolol currently taking 25 mg p.o. twice daily and as needed nitroglycerin.  I did inform him that he needed to be cautious with activities that could result in injury.  We talked about trying to avoid driving for the next few weeks while he is wearing the monitor if at all possible.  I reviewed with him the findings of the stress test and the findings of evidence for a prior infarction or heart attack but no clear areas of reversibility or ischemia.  We talked about that we could certainly proceed to coronary angiography but given lack of symptoms and lack of obvious reduced flow is not clear that he would benefit with some increased risk from angiography.  After discussion he is in agreement and will monitor closely for any symptoms.  He tells me that in 2018 when he did have stenting in the distal right coronary artery he did not have any symptoms at that time as well.

## 2022-02-23 ENCOUNTER — OFFICE VISIT (OUTPATIENT)
Dept: CARDIOLOGY | Facility: CLINIC | Age: 82
End: 2022-02-23
Payer: MEDICARE

## 2022-02-23 VITALS
DIASTOLIC BLOOD PRESSURE: 70 MMHG | HEIGHT: 68 IN | SYSTOLIC BLOOD PRESSURE: 130 MMHG | BODY MASS INDEX: 26.21 KG/M2 | WEIGHT: 172.9 LBS | HEART RATE: 64 BPM | RESPIRATION RATE: 12 BRPM

## 2022-02-23 DIAGNOSIS — R55 SYNCOPE, UNSPECIFIED SYNCOPE TYPE: Primary | ICD-10-CM

## 2022-02-23 DIAGNOSIS — I25.10 CORONARY ARTERY DISEASE INVOLVING NATIVE CORONARY ARTERY OF NATIVE HEART WITHOUT ANGINA PECTORIS: ICD-10-CM

## 2022-02-23 DIAGNOSIS — I47.10 SVT (SUPRAVENTRICULAR TACHYCARDIA) (H): ICD-10-CM

## 2022-02-23 DIAGNOSIS — I10 ESSENTIAL HYPERTENSION: ICD-10-CM

## 2022-02-23 DIAGNOSIS — R06.09 DOE (DYSPNEA ON EXERTION): ICD-10-CM

## 2022-02-23 DIAGNOSIS — E78.5 HYPERLIPIDEMIA LDL GOAL <70: ICD-10-CM

## 2022-02-23 PROCEDURE — 99215 OFFICE O/P EST HI 40 MIN: CPT | Performed by: NURSE PRACTITIONER

## 2022-02-23 NOTE — PATIENT INSTRUCTIONS
Damian Butler,    It was a pleasure to see you today at the [facility].     My recommendations after this visit include:    - No medications changes made today    -Avoid driving until further direction from Dr. Mei     - Make sure to drink at least 64 ounces of fluid per day    - Take caution with any activities that could result in injury    - Follow up with Dr. Crabtree as scheduled on March 24    - Please call [phone], if you have any questions or concerns    Vaibhav Verma CNP

## 2022-02-23 NOTE — LETTER
2/23/2022    Talisha Peña NP, NP  [address] [facility] [address]    RE: Damian Butler       Dear Colleague,     I had the pleasure of seeing Damian Butler in the [facility].          Assessment/Recommendations   Assessment:    1.  Syncope: Recent hospitalization with syncope on February 8-9.  Telemetry monitoring did not show any arrhythmia during the hospitalization.      Patient had couple episodes of feeling warm while sitting on toilet to void but did not have recurrent presyncope or syncope episodes.    He states that his equilibrium has not been very stable for some time.    He reports inadequate fluid intake.    Most recent labs done on 2/9/2022 including CBC and CMP reviewed-stable.    1 month event monitor in progress    2.  History of coronary artery disease with status post MI and CABG in 1997 and PCI to distal RCA via ESPERANZA graft with drug-eluting stents x3 in July 2018:Nuclear stress test was negative for ischemia.  Denies chest pain or angina although he did have some shortness of breath walking to the mailbox.  He walked on treadmill for approximately 20 minutes at [employer] and tolerated with no cardiac symptoms.  We discussed about his safety and taking precaution to prevent injury.    On aspirin, atorvastatin, and nitroglycerin.    3.  History of aortic insufficiency: Echocardiogram showed preserved LVEF 50 to 55% with moderate inferior wall hypokinesis, mild mitral regurgitation, and mild to moderate aortic valve stenosis with moderate aortic regurgitation.      4.  Hypertension: Blood pressure today is stable.  He is asymptomatic.  On Metroprolol tartrate 25 mg twice a day    5.  History of SVT with status post ablation x2 (2010 and 2019): On Metroprolol tartrate    Plan/Recommendation:  -Avoid driving until further direction  -Continue aspirin, atorvastatin and nitroglycerin  -Continue Metroprolol tartrate  -If event monitor result is negative, may have to consider  neurology consult for further evaluation to look for other etiology and also he might benefit from physical therapy primarily for gait and balance.  -Encouraged to contact Dr. Crabtree's office if recurrent symptoms or visit ED for immediate help.  -Encouraged hydration with at least 64 ounces of fluid per day  Follow up with Dr. Crabtree on March 24      History of Present Illness/Subjective    Mr. Damian Butlre is a 81 year old male with a past medical history o of coronary artery disease with status post MI and CABG x2 in 1997 and most recently had PCI to distal RCA wire ESPERANZA graft with drug-eluting stents x3 in July 2018 supraventricular tachycardia with status post ablation in April 2010 and May 2019, aortic insufficiency, hypertension, COPD, and dyslipidemia who is seen at [facility] for post hospitalization follow up.      Patient was hospitalized from February 8 through February 9 with syncope at home.  Echocardiogram did not show any new wall motion abnormalities.  Telemetry monitoring in the hospital did not show any arrhythmia.  He underwent nuclear stress test which did not show any indication of new ischemia.  Patient was seen by Dr. Mei.  He was placed on event monitor.  He was also recommended to avoid driving for the next few weeks while wearing the event monitor.  Patient had a follow-up with PCP-note reviewed.    Today, Damian reports that he has not had further syncope although he had couple episodes of feeling warm while sitting on toilet to void.  He denies straining or have difficulty with micturition or constipation.  He noted some shortness of breath when he walked to the mailbox for about 150-200 feet and snow yesterday.  He has walked at [employer] on treadmill for approximately 20 minutes and tolerated with no cardiac symptoms.  He is trying to remain active to get his strength back.   He further denies fatigue, lightheadedness, shortness of breath,  "orthopnea, PND, palpitations, chest pain and abdominal fullness/bloating.  He reports chronic lower extremity edema.  He wears compression stocking.  He reports urinary frequency during the night which is more chronic for him.  He states that he has been driving since his wife cannot drive due to her health condition.    ECHO-Reviewed:   Interpretation Summary   There is mild concentric left ventricular hypertrophy.  The visual ejection fraction is 50-55%.  There is moderate inferior wall hypokinesis.  There is mild (1+) mitral regurgitation.  Mild to moderate valvular aortic stenosis.  There is moderate (2+) aortic regurgitation.  There is trace to mild tricuspid regurgitation.  Compared to the prior study dated 8/3/2021, there are changes as noted. The  inferior wall appears hypokinetic on the current study. Minimal progression in  the aortic valve stenosis.    Lexiscan stress test: Reviewed  Result Text        Lexiscan stress ECG negative for ischemia     The nuclear stress test is abnormal.     Nuclear images demonstrate a large area of nontransmural infarction involving the inferior to inferolateral wall.  No ischemia noted.     The left ventricular ejection fraction at stress is 60% with inferolateral hypokinesis.     A prior study was conducted on 6/22/2018.  Inferolateral ischemia is not identified on the current study.     The findings of this examination were communicated to Dr. Mei.       Physical Examination Review of Systems   /70 (BP Location: Left arm, Patient Position: Sitting, Cuff Size: Adult Regular)   Pulse 64   Resp 12   Ht 1.727 m (5' 8\")   Wt 78.4 kg (172 lb 14.4 oz)   BMI 26.29 kg/m    Body mass index is 26.29 kg/m .  Wt Readings from Last 3 Encounters:   02/23/22 78.4 kg (172 lb 14.4 oz)   02/09/22 72.8 kg (160 lb 8 oz)   07/29/21 76.2 kg (168 lb)     General Appearance:   no distress, normal body habitus   ENT/Mouth: membranes moist, no oral lesions or bleeding gums.      EYES:  " no scleral icterus, normal conjunctivae   Neck: no carotid bruits or thyromegaly   Chest/Lungs:   lungs are clear to auscultation, no rales or wheezing, equal chest wall expansion    Cardiovascular:    Heart rate regular. Normal first and second heart sounds with 2/6 murmurs, no rubs, or gallops; the carotid, radial and posterior tibial pulses are intact, Jugular venous pressure flat, mild edema bilaterally    Abdomen:  no organomegaly, masses, bruits, or tenderness; bowel sounds are present   Extremities   no cyanosis or clubbing   Radial pulses and Pedal pulses intact and symmetrical.  CMS intact.   Skin: no xanthelasma, warm.    Neurologic: normal  bilateral, no tremors     Psychiatric: alert and oriented x3, calm                        Negative unless noted in HPI     Medical History  Surgical History Family History Social History   No past medical history on file. Past Surgical History:   Procedure Laterality Date     ABLATION OF DYSRHYTHMIC FOCUS  05/07/2019    Typical AV moy reentry     BACK SURGERY       BYPASS GRAFT ARTERY CORONARY  1997    Comments: X 2 LIMA to LAD ESPERANZA to RCA     CARDIAC ELECTROPHYSIOLOGY MAPPING AND ABLATION  2010     CV CORONARY ANGIOGRAM N/A 6/29/2018    Procedure: Coronary Angiogram;  Surgeon: Carito Flannery MD;  Location: [facility];  Service:      CV CORONARY ANGIOGRAM N/A 7/5/2018    Procedure: Coronary Angiogram;  Surgeon: Carito Flannery MD;  Location: [facility];  Service:      CV LEFT HEART CATHETERIZATION WITHOUT LEFT VENTRICULOGRAM Left 6/29/2018    Procedure: Left Heart Catheterization Without Left Ventriculogram;  Surgeon: Carito Flannery MD;  Location: [facility];  Service:      EP ABLATION SVT N/A 5/7/2019    Procedure: EP Ablation Supra Ventricular;  Surgeon: Steve Leyva MD;  Location: [facility];  Service: Cardiology     HAND SURGERY       HC REPAIR EPIGASTRIC HERNIA,REDUC N/A 7/2/2014    Procedure: EPIGASTRIC  INCISIONAL HERNIA REPAIR ;  Surgeon: Daniel Gutierrez MD;  Location: [facility] Main OR;  Service: General     HERNIA REPAIR       MOHS MICROGRAPHIC PROCEDURE       RI ABLATE HEART DYSRHYTHM FOCUS      Description: Catheter Ablation Atrial Supraventricular Tachycardia;  Proc Date: 2010;  Comments: AVNRT  cryoablation     TONSILLECTOMY      Family History   Problem Relation Age of Onset     Hypertension Father     Social History     Socioeconomic History     Marital status:      Spouse name: Not on file     Number of children: Not on file     Years of education: Not on file     Highest education level: Not on file   Occupational History     Not on file   Tobacco Use     Smoking status: Former Smoker     Quit date: 1973     Years since quittin.1     Smokeless tobacco: Never Used   Substance and Sexual Activity     Alcohol use: Yes     Alcohol/week: 1.0 standard drink     Comment: Alcoholic Drinks/day: daily with dinner     Drug use: No     Sexual activity: Not on file   Other Topics Concern     Not on file   Social History Narrative     Not on file     Social Determinants of Health     Financial Resource Strain: Not on file   Food Insecurity: Not on file   Transportation Needs: Not on file   Physical Activity: Not on file   Stress: Not on file   Social Connections: Not on file   Intimate Partner Violence: Not on file   Housing Stability: Not on file          Medications  Allergies   Current Outpatient Medications   Medication Sig Dispense Refill     aspirin 81 MG EC tablet [ASPIRIN 81 MG EC TABLET] Take 1 tablet (81 mg total) by mouth daily.       atorvastatin (LIPITOR) 80 MG tablet [ATORVASTATIN (LIPITOR) 80 MG TABLET] Take 80 mg by mouth daily.       betamethasone dipropionate (DIPROLENE) 0.05 % cream [BETAMETHASONE DIPROPIONATE (DIPROLENE) 0.05 % CREAM] Apply 1 application topically 2 (two) times a day as needed.       desonide (DESOWEN) 0.05 % cream [DESONIDE (DESOWEN) 0.05 % CREAM] Apply  topically 2 (two) times a day as needed.       fluticasone (FLONASE) 50 mcg/actuation nasal spray [FLUTICASONE (FLONASE) 50 MCG/ACTUATION NASAL SPRAY] Apply 2 sprays into each nostril every evening.        glucosamine sulfate (GLUCOSAMINE) 750 mg Tab [GLUCOSAMINE SULFATE (GLUCOSAMINE) 750 MG TAB] Take 1,500 mg by mouth daily.        ipratropium (ATROVENT) 0.06 % nasal spray [IPRATROPIUM (ATROVENT) 0.06 % NASAL SPRAY] Apply 2 sprays into each nostril 2 (two) times a day.       melatonin 3 mg Tab [MELATONIN 3 MG TAB] Take 6 mg by mouth bedtime.        methylcellulose (CITRUCEL) Take 2 g by mouth daily as needed        metoprolol tartrate (LOPRESSOR) 25 MG tablet Take 1 tablet (25 mg) by mouth 2 times daily 180 tablet 1     multivitamin (MULTIVITAMIN) per tablet [MULTIVITAMIN (MULTIVITAMIN) PER TABLET] Take 1 tablet by mouth daily.       nitroglycerin (NITROSTAT) 0.4 MG SL tablet [NITROGLYCERIN (NITROSTAT) 0.4 MG SL TABLET] Place 0.4 mg under the tongue every 5 (five) minutes as needed for chest pain.       omeprazole (PRILOSEC) 20 MG capsule [OMEPRAZOLE (PRILOSEC) 20 MG CAPSULE] Take 20 mg by mouth daily.       sodium chloride (OCEAN) 0.65 % nasal spray Spray 2 sprays into both nostrils 2 times daily Use 30 min before Atrovent spray.       vitamin A-vitamin C-vit E-min (OCUVITE) Tab tablet [VITAMIN A-VITAMIN C-VIT E-MIN (OCUVITE) TAB TABLET] Take 1 tablet by mouth daily.      Allergies   Allergen Reactions     Amiodarone Other (See Comments)     Fatigue, shaking     Amiodarone Analogues [Amiodarone] Muscle Pain (Myalgia)         Lab Results    Chemistry/lipid CBC Cardiac Enzymes/BNP/TSH/INR   Lab Results   Component Value Date    CHOL 114 06/14/2021    HDL 39 (L) 06/14/2021    TRIG 52 06/14/2021    BUN 17 02/09/2022     02/09/2022    CO2 25 02/09/2022    Lab Results   Component Value Date    WBC 7.6 02/09/2022    HGB 13.2 (L) 02/09/2022    HCT 41.1 02/09/2022    MCV 94 02/09/2022     (L) 02/09/2022    Lab  Results   Component Value Date    TROPONINI 0.03 02/08/2022        40 minutes spent on the date of encounter doing chart review, review of outside records, review of test results, interpretation with above tests, patient visit and documentation.        This note has been dictated using voice recognition software. Any grammatical, typographical, or context distortions are unintentional and inherent to the software

## 2022-02-23 NOTE — PROGRESS NOTES
Assessment/Recommendations   Assessment:    1.  Syncope: Recent hospitalization with syncope on February 8-9.  Telemetry monitoring did not show any arrhythmia during the hospitalization.      Patient had couple episodes of feeling warm while sitting on toilet to void but did not have recurrent presyncope or syncope episodes.    He states that his equilibrium has not been very stable for some time.    He reports inadequate fluid intake.    Most recent labs done on 2/9/2022 including CBC and CMP reviewed-stable.    1 month event monitor in progress    2.  History of coronary artery disease with status post MI and CABG in 1997 and PCI to distal RCA via ESPERANZA graft with drug-eluting stents x3 in July 2018:Nuclear stress test was negative for ischemia.  Denies chest pain or angina although he did have some shortness of breath walking to the mailbox.  He walked on treadmill for approximately 20 minutes at [employer] and tolerated with no cardiac symptoms.  We discussed about his safety and taking precaution to prevent injury.    On aspirin, atorvastatin, and nitroglycerin.    3.  History of aortic insufficiency: Echocardiogram showed preserved LVEF 50 to 55% with moderate inferior wall hypokinesis, mild mitral regurgitation, and mild to moderate aortic valve stenosis with moderate aortic regurgitation.      4.  Hypertension: Blood pressure today is stable.  He is asymptomatic.  On Metroprolol tartrate 25 mg twice a day    5.  History of SVT with status post ablation x2 (2010 and 2019): On Metroprolol tartrate    Plan/Recommendation:  -Avoid driving until further direction  -Continue aspirin, atorvastatin and nitroglycerin  -Continue Metroprolol tartrate  -If event monitor result is negative, may have to consider neurology consult for further evaluation to look for other etiology and also he might benefit from physical therapy primarily for gait and balance.  -Encouraged to contact Dr. Crabtree's office if recurrent  symptoms or visit ED for immediate help.  -Encouraged hydration with at least 64 ounces of fluid per day  Follow up with Dr. Crabtree on March 24      History of Present Illness/Subjective    Mr. Damian Butler is a 81 year old male with a past medical history o of coronary artery disease with status post MI and CABG x2 in 1997 and most recently had PCI to distal RCA wire ESPERANZA graft with drug-eluting stents x3 in July 2018 supraventricular tachycardia with status post ablation in April 2010 and May 2019, aortic insufficiency, hypertension, COPD, and dyslipidemia who is seen at [facility] for post hospitalization follow up.      Patient was hospitalized from February 8 through February 9 with syncope at home.  Echocardiogram did not show any new wall motion abnormalities.  Telemetry monitoring in the hospital did not show any arrhythmia.  He underwent nuclear stress test which did not show any indication of new ischemia.  Patient was seen by Dr. Mei.  He was placed on event monitor.  He was also recommended to avoid driving for the next few weeks while wearing the event monitor.  Patient had a follow-up with PCP-note reviewed.    Today, Damian reports that he has not had further syncope although he had couple episodes of feeling warm while sitting on toilet to void.  He denies straining or have difficulty with micturition or constipation.  He noted some shortness of breath when he walked to the mailbox for about 150-200 feet and snow yesterday.  He has walked at [employer] on treadmill for approximately 20 minutes and tolerated with no cardiac symptoms.  He is trying to remain active to get his strength back.   He further denies fatigue, lightheadedness, shortness of breath, orthopnea, PND, palpitations, chest pain and abdominal fullness/bloating.  He reports chronic lower extremity edema.  He wears compression stocking.  He reports urinary frequency during the night which is more  "chronic for him.  He states that he has been driving since his wife cannot drive due to her health condition.    ECHO-Reviewed:   Interpretation Summary   There is mild concentric left ventricular hypertrophy.  The visual ejection fraction is 50-55%.  There is moderate inferior wall hypokinesis.  There is mild (1+) mitral regurgitation.  Mild to moderate valvular aortic stenosis.  There is moderate (2+) aortic regurgitation.  There is trace to mild tricuspid regurgitation.  Compared to the prior study dated 8/3/2021, there are changes as noted. The  inferior wall appears hypokinetic on the current study. Minimal progression in  the aortic valve stenosis.    Lexiscan stress test: Reviewed  Result Text        Lexiscan stress ECG negative for ischemia     The nuclear stress test is abnormal.     Nuclear images demonstrate a large area of nontransmural infarction involving the inferior to inferolateral wall.  No ischemia noted.     The left ventricular ejection fraction at stress is 60% with inferolateral hypokinesis.     A prior study was conducted on 6/22/2018.  Inferolateral ischemia is not identified on the current study.     The findings of this examination were communicated to Dr. Mei.       Physical Examination Review of Systems   /70 (BP Location: Left arm, Patient Position: Sitting, Cuff Size: Adult Regular)   Pulse 64   Resp 12   Ht 1.727 m (5' 8\")   Wt 78.4 kg (172 lb 14.4 oz)   BMI 26.29 kg/m    Body mass index is 26.29 kg/m .  Wt Readings from Last 3 Encounters:   02/23/22 78.4 kg (172 lb 14.4 oz)   02/09/22 72.8 kg (160 lb 8 oz)   07/29/21 76.2 kg (168 lb)     General Appearance:   no distress, normal body habitus   ENT/Mouth: membranes moist, no oral lesions or bleeding gums.      EYES:  no scleral icterus, normal conjunctivae   Neck: no carotid bruits or thyromegaly   Chest/Lungs:   lungs are clear to auscultation, no rales or wheezing, equal chest wall expansion    Cardiovascular:    Heart " rate regular. Normal first and second heart sounds with 2/6 murmurs, no rubs, or gallops; the carotid, radial and posterior tibial pulses are intact, Jugular venous pressure flat, mild edema bilaterally    Abdomen:  no organomegaly, masses, bruits, or tenderness; bowel sounds are present   Extremities   no cyanosis or clubbing   Radial pulses and Pedal pulses intact and symmetrical.  CMS intact.   Skin: no xanthelasma, warm.    Neurologic: normal  bilateral, no tremors     Psychiatric: alert and oriented x3, calm                        Negative unless noted in HPI     Medical History  Surgical History Family History Social History   No past medical history on file. Past Surgical History:   Procedure Laterality Date     ABLATION OF DYSRHYTHMIC FOCUS  05/07/2019    Typical AV moy reentry     BACK SURGERY       BYPASS GRAFT ARTERY CORONARY  1997    Comments: X 2 LIMA to LAD ESPERANZA to RCA     CARDIAC ELECTROPHYSIOLOGY MAPPING AND ABLATION  2010     CV CORONARY ANGIOGRAM N/A 6/29/2018    Procedure: Coronary Angiogram;  Surgeon: Carito Flannery MD;  Location: [facility];  Service:      CV CORONARY ANGIOGRAM N/A 7/5/2018    Procedure: Coronary Angiogram;  Surgeon: Carito Flannery MD;  Location: [facility];  Service:      CV LEFT HEART CATHETERIZATION WITHOUT LEFT VENTRICULOGRAM Left 6/29/2018    Procedure: Left Heart Catheterization Without Left Ventriculogram;  Surgeon: Carito Flannery MD;  Location: [facility];  Service:      EP ABLATION SVT N/A 5/7/2019    Procedure: EP Ablation Supra Ventricular;  Surgeon: Steve Leyva MD;  Location: [facility];  Service: Cardiology     HAND SURGERY       HC REPAIR EPIGASTRIC HERNIA,REDUC N/A 7/2/2014    Procedure: EPIGASTRIC INCISIONAL HERNIA REPAIR ;  Surgeon: Daniel Gutierrez MD;  Location: [facility];  Service: General     HERNIA REPAIR       MOHS MICROGRAPHIC PROCEDURE       KS ABLATE HEART DYSRHYTHM FOCUS      Description:  Catheter Ablation Atrial Supraventricular Tachycardia;  Proc Date: 2010;  Comments: AVNRT  cryoablation     TONSILLECTOMY      Family History   Problem Relation Age of Onset     Hypertension Father     Social History     Socioeconomic History     Marital status:      Spouse name: Not on file     Number of children: Not on file     Years of education: Not on file     Highest education level: Not on file   Occupational History     Not on file   Tobacco Use     Smoking status: Former Smoker     Quit date: 1973     Years since quittin.1     Smokeless tobacco: Never Used   Substance and Sexual Activity     Alcohol use: Yes     Alcohol/week: 1.0 standard drink     Comment: Alcoholic Drinks/day: daily with dinner     Drug use: No     Sexual activity: Not on file   Other Topics Concern     Not on file   Social History Narrative     Not on file     Social Determinants of Health     Financial Resource Strain: Not on file   Food Insecurity: Not on file   Transportation Needs: Not on file   Physical Activity: Not on file   Stress: Not on file   Social Connections: Not on file   Intimate Partner Violence: Not on file   Housing Stability: Not on file          Medications  Allergies   Current Outpatient Medications   Medication Sig Dispense Refill     aspirin 81 MG EC tablet [ASPIRIN 81 MG EC TABLET] Take 1 tablet (81 mg total) by mouth daily.       atorvastatin (LIPITOR) 80 MG tablet [ATORVASTATIN (LIPITOR) 80 MG TABLET] Take 80 mg by mouth daily.       betamethasone dipropionate (DIPROLENE) 0.05 % cream [BETAMETHASONE DIPROPIONATE (DIPROLENE) 0.05 % CREAM] Apply 1 application topically 2 (two) times a day as needed.       desonide (DESOWEN) 0.05 % cream [DESONIDE (DESOWEN) 0.05 % CREAM] Apply topically 2 (two) times a day as needed.       fluticasone (FLONASE) 50 mcg/actuation nasal spray [FLUTICASONE (FLONASE) 50 MCG/ACTUATION NASAL SPRAY] Apply 2 sprays into each nostril every evening.        glucosamine  sulfate (GLUCOSAMINE) 750 mg Tab [GLUCOSAMINE SULFATE (GLUCOSAMINE) 750 MG TAB] Take 1,500 mg by mouth daily.        ipratropium (ATROVENT) 0.06 % nasal spray [IPRATROPIUM (ATROVENT) 0.06 % NASAL SPRAY] Apply 2 sprays into each nostril 2 (two) times a day.       melatonin 3 mg Tab [MELATONIN 3 MG TAB] Take 6 mg by mouth bedtime.        methylcellulose (CITRUCEL) Take 2 g by mouth daily as needed        metoprolol tartrate (LOPRESSOR) 25 MG tablet Take 1 tablet (25 mg) by mouth 2 times daily 180 tablet 1     multivitamin (MULTIVITAMIN) per tablet [MULTIVITAMIN (MULTIVITAMIN) PER TABLET] Take 1 tablet by mouth daily.       nitroglycerin (NITROSTAT) 0.4 MG SL tablet [NITROGLYCERIN (NITROSTAT) 0.4 MG SL TABLET] Place 0.4 mg under the tongue every 5 (five) minutes as needed for chest pain.       omeprazole (PRILOSEC) 20 MG capsule [OMEPRAZOLE (PRILOSEC) 20 MG CAPSULE] Take 20 mg by mouth daily.       sodium chloride (OCEAN) 0.65 % nasal spray Spray 2 sprays into both nostrils 2 times daily Use 30 min before Atrovent spray.       vitamin A-vitamin C-vit E-min (OCUVITE) Tab tablet [VITAMIN A-VITAMIN C-VIT E-MIN (OCUVITE) TAB TABLET] Take 1 tablet by mouth daily.      Allergies   Allergen Reactions     Amiodarone Other (See Comments)     Fatigue, shaking     Amiodarone Analogues [Amiodarone] Muscle Pain (Myalgia)         Lab Results    Chemistry/lipid CBC Cardiac Enzymes/BNP/TSH/INR   Lab Results   Component Value Date    CHOL 114 06/14/2021    HDL 39 (L) 06/14/2021    TRIG 52 06/14/2021    BUN 17 02/09/2022     02/09/2022    CO2 25 02/09/2022    Lab Results   Component Value Date    WBC 7.6 02/09/2022    HGB 13.2 (L) 02/09/2022    HCT 41.1 02/09/2022    MCV 94 02/09/2022     (L) 02/09/2022    Lab Results   Component Value Date    TROPONINI 0.03 02/08/2022        40 minutes spent on the date of encounter doing chart review, review of outside records, review of test results, interpretation with above tests,  patient visit and documentation.        This note has been dictated using voice recognition software. Any grammatical, typographical, or context distortions are unintentional and inherent to the software

## 2022-03-09 LAB
ATRIAL RATE - MUSE: 78 BPM
DIASTOLIC BLOOD PRESSURE - MUSE: 82 MMHG
INTERPRETATION ECG - MUSE: NORMAL
P AXIS - MUSE: 69 DEGREES
PR INTERVAL - MUSE: 266 MS
QRS DURATION - MUSE: 100 MS
QT - MUSE: 402 MS
QTC - MUSE: 458 MS
R AXIS - MUSE: 74 DEGREES
SYSTOLIC BLOOD PRESSURE - MUSE: 175 MMHG
T AXIS - MUSE: 28 DEGREES
VENTRICULAR RATE- MUSE: 78 BPM

## 2022-03-10 PROCEDURE — 93228 REMOTE 30 DAY ECG REV/REPORT: CPT | Performed by: INTERNAL MEDICINE

## 2022-03-24 ENCOUNTER — OFFICE VISIT (OUTPATIENT)
Dept: CARDIOLOGY | Facility: CLINIC | Age: 82
End: 2022-03-24
Payer: MEDICARE

## 2022-03-24 VITALS
DIASTOLIC BLOOD PRESSURE: 64 MMHG | SYSTOLIC BLOOD PRESSURE: 136 MMHG | HEIGHT: 69 IN | BODY MASS INDEX: 24.88 KG/M2 | WEIGHT: 168 LBS | OXYGEN SATURATION: 99 % | HEART RATE: 52 BPM

## 2022-03-24 DIAGNOSIS — I10 HYPERTENSION, UNSPECIFIED TYPE: ICD-10-CM

## 2022-03-24 DIAGNOSIS — I35.1 NONRHEUMATIC AORTIC VALVE INSUFFICIENCY: ICD-10-CM

## 2022-03-24 DIAGNOSIS — E78.5 DYSLIPIDEMIA: ICD-10-CM

## 2022-03-24 DIAGNOSIS — I25.10 CORONARY ARTERY DISEASE INVOLVING NATIVE CORONARY ARTERY WITHOUT ANGINA PECTORIS, UNSPECIFIED WHETHER NATIVE OR TRANSPLANTED HEART: ICD-10-CM

## 2022-03-24 DIAGNOSIS — I47.29 NSVT (NONSUSTAINED VENTRICULAR TACHYCARDIA) (H): Primary | ICD-10-CM

## 2022-03-24 DIAGNOSIS — R55 SYNCOPE, UNSPECIFIED SYNCOPE TYPE: ICD-10-CM

## 2022-03-24 DIAGNOSIS — I35.0 NONRHEUMATIC AORTIC VALVE STENOSIS: ICD-10-CM

## 2022-03-24 PROCEDURE — 99214 OFFICE O/P EST MOD 30 MIN: CPT | Performed by: INTERNAL MEDICINE

## 2022-03-24 NOTE — PROGRESS NOTES
[facility] [facility]   [address], [place] [address]   [email or web address]   OFFICE: [phone]          Thank you Talisha Jacobson for asking the [facility] team to participate in the care of your patient, Damian Butler.*     Impression and Plan     1. Coronary artery disease. Roderick has known coronary disease, having suffered a myocardial infarction in 1997. At that time, he underwent 2-vessel bypass surgery with tiny graft to the LAD and ESPERANZA graft to the RCA.   ?   Damian underwent repeat angiography and on 5 July 2018 had successful PCI of distal right coronary artery via ESPERANZA graft with placement of 3 drug-eluting stents (3.5×28 mm, 3.5×24 mm, and 3.0×12 mm Synergy drug-eluting stents).      Nuclear perfusion imaging study 9 February 2022 revealed a large area of non-transmural infarction involving the inferior to inferolateral wall, but no evidence of ischemia.   ?   ?This is stable. Patient reports no chest pain or other concerning symptoms in this regard.   ?   2. History of supraventricular tachycardia. 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  Patient underwent repeat ablation 7 May 2019.  He states he has had no subjective recurrence since his last procedure.  He is very pleased with how he is performing in this regard.   ?   3.  Aortic stenosis/aortic insufficiency.  Echocardiogram 8 February 2022 revealed moderate aortic insufficiency and mild-moderate aortic stenosis.    4.  Hypertension.   Blood pressure is fairly reasonable in the office today at 136/64 mmHg     4.  Syncope.  Damian had been hospitalized early February 2022 after having episode of syncope.  As noted below, he had gotten up to use the washroom as he commonly does at night.  He had passed his urine while standing and went to wash his hands at the vanity and had rather abrupt loss of  consciousness.  He was hospitalized at [facility] at that time.  Based on the description of the event, it does sound as though it was a vasovagal/micturition type syncopal event.  He did undergo an ambulatory monitor 9 February 2022 through 8 March 2022 that did reveal an episode of non-sustained ventricular tachycardia lasting 12 beats.     Continue metoprolol (normally would consider increasing, but heart rate only 52 bpm in the clinic today).     Will refer for formal consultation with one of our Electrophysiologist given the finding of NSVT on his ambulatory monitor and known myocardial scar.   ?   5. Dyslipidemia.  Lipid profile 14 June 2021 was favorable with LDL 65 mg/dL and HDL 39 mg/dL.   ?     35 minutes spent reviewing prior records (including documentation, laboratory studies, cardiac testing/imaging), interview with patient along with physical exam, planning, and subsequent documentation/crafting of note.           History of Present Illness    Once again I would like to thank you again for asking me to participate in the care of your patient, Damian Butler.  As you know, but to reiterate for my own records, Damian Butler is a 81 year old male with known coronary disease, having suffered a myocardial infarction in 1997. At that time, he underwent 2-vessel bypass surgery with tiny graft to the LAD and ESPERANZA graft to the RCA. Historically, he has had well-preserved LV systolic performance.    ?   Damian underwent repeat angiography and on 5 July 2018 had successful PCI of distal right coronary artery via ESPERANZA graft with placement of 3 drug-eluting stents (3.5×28 mm, 3.5×24 mm, and 3.0×12 mm Synergy drug-eluting stents).   ?   He also has a history of having AV moy reentry tachycardia for which he underwent ablative therapy in April 2010.  Patient underwent repeat ablation for SVT 7 May 2019.     More recently, Damian had an episode of syncope.  Specifically, he had gotten up as he commonly does to use  the washroom at night.  He had passed his urine.  He was standing.  He went to the [facility] to wash his hands and had rather abrupt loss of consciousness.  He was hospitalized at [facility] at that time and work-up was relatively unremarkable.  He was dismissed from hospital with an ambulatory monitor.  ?   In follow-up today, Damian states that he has been doing well from a cardiac standpoint since his dismissal from the hospital.  He denies chest pain or shortness of breath.  Exercise tolerance is stable.  He denies any subjective recurrence of SVT after his last ablation.  He denies any fevers, chills, or other constitutional symptoms.   He continues to have lower extremity edema though this is been stable.  Swelling is improved in the morning when he first gets up for the day.    Further review of systems is otherwise negative/noncontributory (medical record and 13 point review of systems reviewed as well and pertinent positives noted).         Cardiac Diagnostics      Echocardiogram 8 February 2022:   1. Normal left ventricular size and systolic performance with ejection fraction of 50 to 55%.   2. Moderate inferior hypokinesis.   3. Mild increase in left ventricular wall thickness.   4. Mild to moderate aortic stenosis.   5. Moderate aortic insufficiency.   6. Normal right ventricular size and systolic performance.   7. Normal atrial dimensions.      Echocardiogram 26 March 2017:   1. Normal left ventricular size and systolic performance with ejection fraction of 55%.   2. Mild aortic insufficiency.   3. Normal right ventricular size and systolic performance.   4. Mild left atrial enlargement.      Echocardiogram 20 November 2009 (stress echocardiogram):   1. Normal LV size and function.   2. No significant valvular heart disease. ?   3. There was no evidence of ischemia.      Nuclear perfusion imaging study 9 February 2022:   1. Nuclear images demonstrate a large area of non-transmural infarction involving the  inferior to inferolateral wall.  No ischemia noted.   2. Normal left ventricular systolic performance with ejection fraction of 60%.  Inferolateral hypokinesis noted.      Nuclear perfusion imaging study 22 June 2018 (pre-coronary angiogram):   1. Medium-sized area of mixed ischemia and non-transmural infarction in the inferior and inferolateral segments.   2. Normal left ventricular systolic performance with ejection fraction of 66%.      Nuclear stress perfusion studies 12 November 2010 :   1. Splanchnic artifact, though there was a small-medium sized inferior and inferolateral defect consistent with small infarction and small area of ischemia.   2. Normal left ventricular systolic performance with ejection fraction of 56%. ?   3. It is felt unchanged from March of 2009.      Coronary angiography 5 July 2018:   1. Successful PCI of distal right coronary artery via ESPERANZA graft with placement of 3 drug-eluting stents (3.5×28 mm, 3.5×24 mm, and 3.0×12 mm Synergy drug-eluting stents).      Coronary angiography 29 June 2018:   1. Left Main coronary artery: 10% stenosis.   2. Left anterior descending coronary artery: Proximal-mid 100% stenosis.  First a 50% stenosis.   3. Circumflex coronary artery: Moderate size vessel and angiographically normal.   4. Right coronary artery: 100% mid stenosis with distal 80% stenosis.   5. ESPERANZA graft to mid right coronary artery is large and angiographically normal.   6. LIMA graft to mid LAD is large and angiographically normal.      Ambulatory monitor 9 February 2022 through 8 March 2022:   1. Sinus rhythm without sustained tachyarrhythmia or significant bradyarrhythmia.    2. Occasional premature ventricular contractions.  3. Single episode of non-sustained ventricular tachycardia of 12 beats.      One-patch monitor 16 April 2018 through 16 May 2018:   1. The palpitations and rapid heart beating associated with supraventricular tachycardia, probably AV moy reentry rate 115 beats  "per minute on 04/29/2018 and 05/01/2018. ?   2. On other occasions palpitations were associated with ventricular premature beats or ventricular couplet or with sinus rhythm or sinus tachycardia without ectopy.      24-hour Holter monitor 19 February 2011:   1. Frequent PVCs suggestive of inferior left ventricular origin.   ?          Physical Examination       /64 (BP Location: Left arm, Patient Position: Sitting, Cuff Size: Adult Regular)   Pulse 52   Ht 1.74 m (5' 8.5\")   Wt 76.2 kg (168 lb)   SpO2 99%   BMI 25.17 kg/m          Wt Readings from Last 3 Encounters:   03/24/22 76.2 kg (168 lb)   02/23/22 78.4 kg (172 lb 14.4 oz)   02/09/22 72.8 kg (160 lb 8 oz)       The patient is alert and oriented times three. Sclerae are anicteric. Mucosal membranes are moist. Jugular venous pressure is normal. No significant adenopathy/thyromegally appreciated. Lungs are clear with good expansion. On cardiovascular exam, the patient has a regular S1 and S2.  Soft systolic murmur.  Abdomen is soft and non-tender. Extremities reveal no clubbing, cyanosis, with bilateral lower extremity edema.  Compression stockings in place.         Medications  Allergies   Current Outpatient Medications   Medication Sig Dispense Refill     aspirin 81 MG EC tablet [ASPIRIN 81 MG EC TABLET] Take 1 tablet (81 mg total) by mouth daily.       atorvastatin (LIPITOR) 80 MG tablet [ATORVASTATIN (LIPITOR) 80 MG TABLET] Take 80 mg by mouth daily.       betamethasone dipropionate (DIPROLENE) 0.05 % cream [BETAMETHASONE DIPROPIONATE (DIPROLENE) 0.05 % CREAM] Apply 1 application topically 2 (two) times a day as needed.       desonide (DESOWEN) 0.05 % cream [DESONIDE (DESOWEN) 0.05 % CREAM] Apply topically 2 (two) times a day as needed.       fluticasone (FLONASE) 50 mcg/actuation nasal spray [FLUTICASONE (FLONASE) 50 MCG/ACTUATION NASAL SPRAY] Apply 2 sprays into each nostril every evening.        glucosamine sulfate (GLUCOSAMINE) 750 mg Tab " [GLUCOSAMINE SULFATE (GLUCOSAMINE) 750 MG TAB] Take 1,500 mg by mouth daily.        ipratropium (ATROVENT) 0.06 % nasal spray [IPRATROPIUM (ATROVENT) 0.06 % NASAL SPRAY] Apply 2 sprays into each nostril 2 (two) times a day.       melatonin 3 mg Tab [MELATONIN 3 MG TAB] Take 6 mg by mouth bedtime.        methylcellulose (CITRUCEL) Take 2 g by mouth daily as needed        metoprolol tartrate (LOPRESSOR) 25 MG tablet TAKE 1 TABLET BY MOUTH TWICE A  tablet 1     multivitamin (MULTIVITAMIN) per tablet [MULTIVITAMIN (MULTIVITAMIN) PER TABLET] Take 1 tablet by mouth daily.       nitroglycerin (NITROSTAT) 0.4 MG SL tablet [NITROGLYCERIN (NITROSTAT) 0.4 MG SL TABLET] Place 0.4 mg under the tongue every 5 (five) minutes as needed for chest pain.       omeprazole (PRILOSEC) 20 MG capsule [OMEPRAZOLE (PRILOSEC) 20 MG CAPSULE] Take 20 mg by mouth daily.       sodium chloride (OCEAN) 0.65 % nasal spray Spray 2 sprays into both nostrils 2 times daily Use 30 min before Atrovent spray.       vitamin A-vitamin C-vit E-min (OCUVITE) Tab tablet [VITAMIN A-VITAMIN C-VIT E-MIN (OCUVITE) TAB TABLET] Take 1 tablet by mouth daily.         Allergies   Allergen Reactions     Amiodarone Other (See Comments)     Fatigue, shaking     Amiodarone Analogues [Amiodarone] Muscle Pain (Myalgia)          Lab Results    Chemistry/lipid CBC Cardiac Enzymes/BNP/TSH/INR   Recent Labs   Lab Test 06/14/21 0912   CHOL 114   HDL 39*   LDL 65   TRIG 52     Recent Labs   Lab Test 06/14/21  0912 12/14/20  0850 12/12/19  0830   LDL 65 64 57     Recent Labs   Lab Test 02/09/22 0419      POTASSIUM 4.5   CHLORIDE 105   CO2 25   GLC 84   BUN 17   CR 0.93   GFRESTIMATED 81   CHICHO 8.7     Recent Labs   Lab Test 02/09/22 0419 02/08/22  0325 06/14/21  0912   CR 0.93 0.97 0.86     No results for input(s): A1C in the last 79765 hours.       Recent Labs   Lab Test 02/09/22 0419   WBC 7.6   HGB 13.2*   HCT 41.1   MCV 94   *     Recent Labs   Lab Test  02/09/22  0419 02/08/22  0325 05/07/19  0614   HGB 13.2* 13.6 14.2    Recent Labs   Lab Test 02/08/22  1213 02/08/22  0820 02/08/22  0325   TROPONINI 0.03 0.03 0.01     No results for input(s): BNP, NTBNPI, NTBNP in the last 24481 hours.  No results for input(s): TSH in the last 39430 hours.  No results for input(s): INR in the last 00641 hours.     Medical History  Surgical History Family History Social History   No past medical history on file.  Past Surgical History:   Procedure Laterality Date     ABLATION OF DYSRHYTHMIC FOCUS  05/07/2019    Typical AV moy reentry     BACK SURGERY       BYPASS GRAFT ARTERY CORONARY  1997    Comments: X 2 LIMA to LAD ESPERANZA to RCA     CARDIAC ELECTROPHYSIOLOGY MAPPING AND ABLATION  2010     CV CORONARY ANGIOGRAM N/A 6/29/2018    Procedure: Coronary Angiogram;  Surgeon: Carito Flannery MD;  Location: [facility] Cath Lab;  Service:      CV CORONARY ANGIOGRAM N/A 7/5/2018    Procedure: Coronary Angiogram;  Surgeon: Carito Flannery MD;  Location: [facility] Cath Lab;  Service:      CV LEFT HEART CATHETERIZATION WITHOUT LEFT VENTRICULOGRAM Left 6/29/2018    Procedure: Left Heart Catheterization Without Left Ventriculogram;  Surgeon: Carito Flannery MD;  Location: [facility] Cath Lab;  Service:      EP ABLATION SVT N/A 5/7/2019    Procedure: EP Ablation Supra Ventricular;  Surgeon: Steve Leyva MD;  Location: [facility] Cath Lab;  Service: Cardiology     HAND SURGERY       HC REPAIR EPIGASTRIC HERNIA,REDUC N/A 7/2/2014    Procedure: EPIGASTRIC INCISIONAL HERNIA REPAIR ;  Surgeon: Daniel Gutierrez MD;  Location: [facility] OR;  Service: General     HERNIA REPAIR       MOHS MICROGRAPHIC PROCEDURE       CA ABLATE HEART DYSRHYTHM FOCUS      Description: Catheter Ablation Atrial Supraventricular Tachycardia;  Proc Date: 04/20/2010;  Comments: AVNRT  cryoablation     TONSILLECTOMY       Family History   Problem Relation Age of Onset     Hypertension Father         Social  History     Socioeconomic History     Marital status:      Spouse name: Not on file     Number of children: Not on file     Years of education: Not on file     Highest education level: Not on file   Occupational History     Not on file   Tobacco Use     Smoking status: Former Smoker     Quit date: 1973     Years since quittin.2     Smokeless tobacco: Never Used   Substance and Sexual Activity     Alcohol use: Yes     Alcohol/week: 1.0 standard drink     Comment: Alcoholic Drinks/day: daily with dinner     Drug use: No     Sexual activity: Not on file   Other Topics Concern     Not on file   Social History Narrative     Not on file     Social Determinants of Health     Financial Resource Strain: Not on file   Food Insecurity: Not on file   Transportation Needs: Not on file   Physical Activity: Not on file   Stress: Not on file   Social Connections: Not on file   Intimate Partner Violence: Not on file   Housing Stability: Not on file

## 2022-03-24 NOTE — LETTER
3/24/2022    Talisha Peña NP, NP  [address] [facility] [address]    RE: Damian Butler       Dear Colleague,     I had the pleasure of seeing Damian Butler in the [facility].         [facility]   [address], [place] [address]   [email or web address]   OFFICE: [phone]          Thank you Talisha Jacobson for asking the [facility] team to participate in the care of your patient, Damian Butler.*     Impression and Plan     1. Coronary artery disease. Roderick has known coronary disease, having suffered a myocardial infarction in 1997. At that time, he underwent 2-vessel bypass surgery with tiny graft to the LAD and ESPERANZA graft to the RCA.   ?   Damian underwent repeat angiography and on 5 July 2018 had successful PCI of distal right coronary artery via ESPERANZA graft with placement of 3 drug-eluting stents (3.5×28 mm, 3.5×24 mm, and 3.0×12 mm Synergy drug-eluting stents).      Nuclear perfusion imaging study 9 February 2022 revealed a large area of non-transmural infarction involving the inferior to inferolateral wall, but no evidence of ischemia.   ?   ?This is stable. Patient reports no chest pain or other concerning symptoms in this regard.   ?   2. History of supraventricular tachycardia. 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  Patient underwent repeat ablation 7 May 2019.  He states he has had no subjective recurrence since his last procedure.  He is very pleased with how he is performing in this regard.   ?   3.  Aortic stenosis/aortic insufficiency.  Echocardiogram 8 February 2022 revealed moderate aortic insufficiency and mild-moderate aortic stenosis.    4.  Hypertension.   Blood pressure is fairly reasonable in the office today at 136/64 mmHg     4.  Syncope.  Damian had been hospitalized early February 2022 after having episode of  syncope.  As noted below, he had gotten up to use the washroom as he commonly does at night.  He had passed his urine while standing and went to wash his hands at the vanity and had rather abrupt loss of consciousness.  He was hospitalized at [facility] at that time.  Based on the description of the event, it does sound as though it was a vasovagal/micturition type syncopal event.  He did undergo an ambulatory monitor 9 February 2022 through 8 March 2022 that did reveal an episode of non-sustained ventricular tachycardia lasting 12 beats.     Continue metoprolol (normally would consider increasing, but heart rate only 52 bpm in the clinic today).     Will refer for formal consultation with one of our Electrophysiologist given the finding of NSVT on his ambulatory monitor and known myocardial scar.   ?   5. Dyslipidemia.  Lipid profile 14 June 2021 was favorable with LDL 65 mg/dL and HDL 39 mg/dL.   ?     35 minutes spent reviewing prior records (including documentation, laboratory studies, cardiac testing/imaging), interview with patient along with physical exam, planning, and subsequent documentation/crafting of note.           History of Present Illness    Once again I would like to thank you again for asking me to participate in the care of your patient, Damian Butler.  As you know, but to reiterate for my own records, Damian Butler is a 81 year old male with known coronary disease, having suffered a myocardial infarction in 1997. At that time, he underwent 2-vessel bypass surgery with tiny graft to the LAD and ESPERANZA graft to the RCA. Historically, he has had well-preserved LV systolic performance.    ?   Damian underwent repeat angiography and on 5 July 2018 had successful PCI of distal right coronary artery via ESPERANZA graft with placement of 3 drug-eluting stents (3.5×28 mm, 3.5×24 mm, and 3.0×12 mm Synergy drug-eluting stents).   ?   He also has a history of having AV moy reentry tachycardia for which he  underwent ablative therapy in April 2010.  Patient underwent repeat ablation for SVT 7 May 2019.     More recently, Damian had an episode of syncope.  Specifically, he had gotten up as he commonly does to use the washroom at night.  He had passed his urine.  He was standing.  [facility] and had rather abrupt loss of consciousness.  He was hospitalized at [facility] at that time and work-up was relatively unremarkable.  He was dismissed from hospital with an ambulatory monitor.  ?   In follow-up today, Damian states that he has been doing well from a cardiac standpoint since his dismissal from the hospital.  He denies chest pain or shortness of breath.  Exercise tolerance is stable.  He denies any subjective recurrence of SVT after his last ablation.  He denies any fevers, chills, or other constitutional symptoms.   He continues to have lower extremity edema though this is been stable.  Swelling is improved in the morning when he first gets up for the day.    Further review of systems is otherwise negative/noncontributory (medical record and 13 point review of systems reviewed as well and pertinent positives noted).         Cardiac Diagnostics      Echocardiogram 8 February 2022:   1. Normal left ventricular size and systolic performance with ejection fraction of 50 to 55%.   2. Moderate inferior hypokinesis.   3. Mild increase in left ventricular wall thickness.   4. Mild to moderate aortic stenosis.   5. Moderate aortic insufficiency.   6. Normal right ventricular size and systolic performance.   7. Normal atrial dimensions.      Echocardiogram 26 March 2017:   1. Normal left ventricular size and systolic performance with ejection fraction of 55%.   2. Mild aortic insufficiency.   3. Normal right ventricular size and systolic performance.   4. Mild left atrial enlargement.      Echocardiogram 20 November 2009 (stress echocardiogram):   1. Normal LV size and function.   2. No significant  valvular heart disease. ?   3. There was no evidence of ischemia.      Nuclear perfusion imaging study 9 February 2022:   1. Nuclear images demonstrate a large area of non-transmural infarction involving the inferior to inferolateral wall.  No ischemia noted.   2. Normal left ventricular systolic performance with ejection fraction of 60%.  Inferolateral hypokinesis noted.      Nuclear perfusion imaging study 22 June 2018 (pre-coronary angiogram):   1. Medium-sized area of mixed ischemia and non-transmural infarction in the inferior and inferolateral segments.   2. Normal left ventricular systolic performance with ejection fraction of 66%.      Nuclear stress perfusion studies 12 November 2010 :   1. Splanchnic artifact, though there was a small-medium sized inferior and inferolateral defect consistent with small infarction and small area of ischemia.   2. Normal left ventricular systolic performance with ejection fraction of 56%. ?   3. It is felt unchanged from March of 2009.      Coronary angiography 5 July 2018:   1. Successful PCI of distal right coronary artery via ESPERANZA graft with placement of 3 drug-eluting stents (3.5×28 mm, 3.5×24 mm, and 3.0×12 mm Synergy drug-eluting stents).      Coronary angiography 29 June 2018:   1. Left Main coronary artery: 10% stenosis.   2. Left anterior descending coronary artery: Proximal-mid 100% stenosis.  First a 50% stenosis.   3. Circumflex coronary artery: Moderate size vessel and angiographically normal.   4. Right coronary artery: 100% mid stenosis with distal 80% stenosis.   5. ESPERANZA graft to mid right coronary artery is large and angiographically normal.   6. LIMA graft to mid LAD is large and angiographically normal.      Ambulatory monitor 9 February 2022 through 8 March 2022:   1. Sinus rhythm without sustained tachyarrhythmia or significant bradyarrhythmia.    2. Occasional premature ventricular contractions.  3. Single episode of non-sustained ventricular  "tachycardia of 12 beats.      One-patch monitor 16 April 2018 through 16 May 2018:   1. The palpitations and rapid heart beating associated with supraventricular tachycardia, probably AV moy reentry rate 115 beats per minute on 04/29/2018 and 05/01/2018. ?   2. On other occasions palpitations were associated with ventricular premature beats or ventricular couplet or with sinus rhythm or sinus tachycardia without ectopy.      24-hour Holter monitor 19 February 2011:   1. Frequent PVCs suggestive of inferior left ventricular origin.   ?          Physical Examination       /64 (BP Location: Left arm, Patient Position: Sitting, Cuff Size: Adult Regular)   Pulse 52   Ht 1.74 m (5' 8.5\")   Wt 76.2 kg (168 lb)   SpO2 99%   BMI 25.17 kg/m          Wt Readings from Last 3 Encounters:   03/24/22 76.2 kg (168 lb)   02/23/22 78.4 kg (172 lb 14.4 oz)   02/09/22 72.8 kg (160 lb 8 oz)       The patient is alert and oriented times three. Sclerae are anicteric. Mucosal membranes are moist. Jugular venous pressure is normal. No significant adenopathy/thyromegally appreciated. Lungs are clear with good expansion. On cardiovascular exam, the patient has a regular S1 and S2.  Soft systolic murmur.  Abdomen is soft and non-tender. Extremities reveal no clubbing, cyanosis, with bilateral lower extremity edema.  Compression stockings in place.         Medications  Allergies   Current Outpatient Medications   Medication Sig Dispense Refill     aspirin 81 MG EC tablet [ASPIRIN 81 MG EC TABLET] Take 1 tablet (81 mg total) by mouth daily.       atorvastatin (LIPITOR) 80 MG tablet [ATORVASTATIN (LIPITOR) 80 MG TABLET] Take 80 mg by mouth daily.       betamethasone dipropionate (DIPROLENE) 0.05 % cream [BETAMETHASONE DIPROPIONATE (DIPROLENE) 0.05 % CREAM] Apply 1 application topically 2 (two) times a day as needed.       desonide (DESOWEN) 0.05 % cream [DESONIDE (DESOWEN) 0.05 % CREAM] Apply topically 2 (two) times a day as needed.  "      fluticasone (FLONASE) 50 mcg/actuation nasal spray [FLUTICASONE (FLONASE) 50 MCG/ACTUATION NASAL SPRAY] Apply 2 sprays into each nostril every evening.        glucosamine sulfate (GLUCOSAMINE) 750 mg Tab [GLUCOSAMINE SULFATE (GLUCOSAMINE) 750 MG TAB] Take 1,500 mg by mouth daily.        ipratropium (ATROVENT) 0.06 % nasal spray [IPRATROPIUM (ATROVENT) 0.06 % NASAL SPRAY] Apply 2 sprays into each nostril 2 (two) times a day.       melatonin 3 mg Tab [MELATONIN 3 MG TAB] Take 6 mg by mouth bedtime.        methylcellulose (CITRUCEL) Take 2 g by mouth daily as needed        metoprolol tartrate (LOPRESSOR) 25 MG tablet TAKE 1 TABLET BY MOUTH TWICE A  tablet 1     multivitamin (MULTIVITAMIN) per tablet [MULTIVITAMIN (MULTIVITAMIN) PER TABLET] Take 1 tablet by mouth daily.       nitroglycerin (NITROSTAT) 0.4 MG SL tablet [NITROGLYCERIN (NITROSTAT) 0.4 MG SL TABLET] Place 0.4 mg under the tongue every 5 (five) minutes as needed for chest pain.       omeprazole (PRILOSEC) 20 MG capsule [OMEPRAZOLE (PRILOSEC) 20 MG CAPSULE] Take 20 mg by mouth daily.       sodium chloride (OCEAN) 0.65 % nasal spray Spray 2 sprays into both nostrils 2 times daily Use 30 min before Atrovent spray.       vitamin A-vitamin C-vit E-min (OCUVITE) Tab tablet [VITAMIN A-VITAMIN C-VIT E-MIN (OCUVITE) TAB TABLET] Take 1 tablet by mouth daily.         Allergies   Allergen Reactions     Amiodarone Other (See Comments)     Fatigue, shaking     Amiodarone Analogues [Amiodarone] Muscle Pain (Myalgia)          Lab Results    Chemistry/lipid CBC Cardiac Enzymes/BNP/TSH/INR   Recent Labs   Lab Test 06/14/21  0912   CHOL 114   HDL 39*   LDL 65   TRIG 52     Recent Labs   Lab Test 06/14/21  0912 12/14/20  0850 12/12/19  0830   LDL 65 64 57     Recent Labs   Lab Test 02/09/22  0419      POTASSIUM 4.5   CHLORIDE 105   CO2 25   GLC 84   BUN 17   CR 0.93   GFRESTIMATED 81   CHICHO 8.7     Recent Labs   Lab Test 02/09/22  0419 02/08/22  0325  06/14/21  0912   CR 0.93 0.97 0.86     No results for input(s): A1C in the last 84724 hours.       Recent Labs   Lab Test 02/09/22  0419   WBC 7.6   HGB 13.2*   HCT 41.1   MCV 94   *     Recent Labs   Lab Test 02/09/22  0419 02/08/22  0325 05/07/19  0614   HGB 13.2* 13.6 14.2    Recent Labs   Lab Test 02/08/22  1213 02/08/22  0820 02/08/22  0325   TROPONINI 0.03 0.03 0.01     No results for input(s): BNP, NTBNPI, NTBNP in the last 32052 hours.  No results for input(s): TSH in the last 09771 hours.  No results for input(s): INR in the last 42461 hours.     Medical History  Surgical History Family History Social History   No past medical history on file.  Past Surgical History:   Procedure Laterality Date     ABLATION OF DYSRHYTHMIC FOCUS  05/07/2019    Typical AV moy reentry     BACK SURGERY       BYPASS GRAFT ARTERY CORONARY  1997    Comments: X 2 LIMA to LAD ESPERANZA to RCA     CARDIAC ELECTROPHYSIOLOGY MAPPING AND ABLATION  2010     CV CORONARY ANGIOGRAM N/A 6/29/2018    Procedure: Coronary Angiogram;  Surgeon: Carito Flannery MD;  Location: [facility];  Service:      CV CORONARY ANGIOGRAM N/A 7/5/2018    Procedure: Coronary Angiogram;  Surgeon: Carito Flannery MD;  Location: [facility];  Service:      CV LEFT HEART CATHETERIZATION WITHOUT LEFT VENTRICULOGRAM Left 6/29/2018    Procedure: Left Heart Catheterization Without Left Ventriculogram;  Surgeon: Carito Flannery MD;  Location: [facility];  Service:      EP ABLATION SVT N/A 5/7/2019    Procedure: EP Ablation Supra Ventricular;  Surgeon: Steve Leyva MD;  Location: [facility];  Service: Cardiology     HAND SURGERY       HC REPAIR EPIGASTRIC HERNIA,REDUC N/A 7/2/2014    Procedure: EPIGASTRIC INCISIONAL HERNIA REPAIR ;  Surgeon: Daniel Gutierrez MD;  Location: [facility] OR;  Service: General     HERNIA REPAIR       MOHS MICROGRAPHIC PROCEDURE       [place] ABLATE HEART DYSRHYTHM FOCUS      Description: Catheter  Ablation Atrial Supraventricular Tachycardia;  Proc Date: 2010;  Comments: AVNRT  cryoablation     TONSILLECTOMY       Family History   Problem Relation Age of Onset     Hypertension Father         Social History     Socioeconomic History     Marital status:      Spouse name: Not on file     Number of children: Not on file     Years of education: Not on file     Highest education level: Not on file   Occupational History     Not on file   Tobacco Use     Smoking status: Former Smoker     Quit date: 1973     Years since quittin.2     Smokeless tobacco: Never Used   Substance and Sexual Activity     Alcohol use: Yes     Alcohol/week: 1.0 standard drink     Comment: Alcoholic Drinks/day: daily with dinner     Drug use: No     Sexual activity: Not on file   Other Topics Concern     Not on file   Social History Narrative     Not on file     Social Determinants of Health     Financial Resource Strain: Not on file   Food Insecurity: Not on file   Transportation Needs: Not on file   Physical Activity: Not on file   Stress: Not on file   Social Connections: Not on file   Intimate Partner Violence: Not on file   Housing Stability: Not on file                      Thank you for allowing me to participate in the care of your patient.      Sincerely,     Renetta Crabtree MD     [facility]  cc:   No referring provider defined for this encounter.

## 2022-05-19 ENCOUNTER — OFFICE VISIT (OUTPATIENT)
Dept: CARDIOLOGY | Facility: CLINIC | Age: 82
End: 2022-05-19
Attending: INTERNAL MEDICINE
Payer: MEDICARE

## 2022-05-19 VITALS
DIASTOLIC BLOOD PRESSURE: 70 MMHG | HEART RATE: 51 BPM | BODY MASS INDEX: 25.47 KG/M2 | RESPIRATION RATE: 20 BRPM | SYSTOLIC BLOOD PRESSURE: 140 MMHG | WEIGHT: 170 LBS

## 2022-05-19 DIAGNOSIS — R55 SYNCOPE, UNSPECIFIED SYNCOPE TYPE: Primary | ICD-10-CM

## 2022-05-19 DIAGNOSIS — I47.29 NSVT (NONSUSTAINED VENTRICULAR TACHYCARDIA) (H): ICD-10-CM

## 2022-05-19 PROCEDURE — 99214 OFFICE O/P EST MOD 30 MIN: CPT | Performed by: INTERNAL MEDICINE

## 2022-05-19 NOTE — PROGRESS NOTES
[facility]  Cardiac Electrophysiology  [address]  [place] [address]   Office: [phone]  Fax: [phone]     Cardiac Electrophysiology Follow-up Visit    Patient: Damian Butler   : 1940     Referring Provider: Renetta Crabtree MD  Primary Care Provider: Talisha Peña NP    CHIEF COMPLAINT/REASON FOR VISIT  Nonsustained ventricular tachycardia  First degree atrioventricular block  Syncope  History of typical AVNRT status post ablations (2010, 2019)    Assessment/Recommendations   Damian Butler is a 81 year old male with first degree atrioventricular block, CAD with prior non-transmural inferior-inferolateral MI and CABG and PCI, mild-moderate aortic stenosis and moderate aortic regurgitation, history of typical AVNRT status post ablations (2010, 2019), HTN, dyslipidemia referred by Dr. Crabtree for evaluation of syncope and NSVT.    Syncope - single episode, etiology indeterminate, features consistent with vagally mediated (possibly micturition) syncope.  He has preserved LVEF, though with inferior and inferolateral non-transmural scar.  Single 12 beat episode of relatively slow and irregular monomorphic NSVT.  Baseline first degree AV block.  Relationship of both of these to his syncopal episode is indeterminate.  We reviewed NSVT and considerations regarding syncope, co-existing preserved LV function with known LV scar; and we discussed options including expectant management, ILR, EP study for VT/VF inducibility.    At present, he would strongly prefer expectant management, with consideration for ILR implantation in case of recurrent episode.  He will contact us with any recurrent episodes of syncope or with any further questions.      Follow up: as above         History of Present Illness   Damian Butler is a 81 year old male with first degree atrioventricular block, CAD with prior non-transmural inferior-inferolateral MI and CABG  and PCI, mild-moderate aortic stenosis and moderate aortic regurgitation, history of typical AVNRT status post ablations (4/2010, 5/7/2019), HTN, dyslipidemia referred by Dr. Crabtree for evaluation of syncope and NSVT.    Mr. Butler notes a long history of arising 2-3 times per night to urinate.  He had an episode of syncope after urinating overnight in the bathroom and was admitted 2/8-9/2022 at [facility] for evaluation - 2/8/2022 TTE showed mild concentric LVH, LVEF 50-55%, inferior hypokinesis; 2/9/2022 MPI showed LVEF 60%, large non-transmural inferior and inferolateral scar; MCT 2/9/2022 to 3/8/2022 showed SR, 12 beat NSVT at 109bpm.      He has not had prior or recurrent syncope, and has not had lightheadedness or near syncope.  He denies chest pain, exertional dyspnea, lower extremity edema.  He is here today with his wife.       Physical Examination  Review of Systems   VITALS: BP (!) 140/70 (BP Location: Right arm, Patient Position: Sitting, Cuff Size: Adult Regular)   Pulse 51   Resp 20   Wt 77.1 kg (170 lb)   BMI 25.47 kg/m      Wt Readings from Last 3 Encounters:   03/24/22 76.2 kg (168 lb)   02/23/22 78.4 kg (172 lb 14.4 oz)   02/09/22 72.8 kg (160 lb 8 oz)     CONSTITUTIONAL: well nourished, comfortable, no distress  EYES:  Conjunctivae pink, sclerae clear.    E/N/T:  Oral mucosa pink  RESPIRATORY:  Respiratory effort is normal  CARDIOVASCULAR:  normal S1 and S2  GASTROINTESTINAL:  Abdomen without masses or tenderness  EXTREMITIES:  No clubbing or cyanosis.    MUSCULOSKELETAL:  Overall grossly normal muscle strength  SKIN:  Overall, skin warm and dry, no lesions.  NEURO/PSYCH:  Oriented x 3 with normal affect.   Constitutional:  No weight loss or loss of appetite    Eyes:  No difficulty with vision, no double vision, no dry eyes  ENT:  No sore throat, difficulty swallowing; changes in hearing or tinnitus  Cardiovascular: As detailed above  Respiratory:  No cough  Musculoskeletal  No joint pain,  muscle aches  Neurologic:  Syncope as above   Hematologic: No easy bruising, excessive bleeding tendency   Gastrointestinal:  No jaundice, abdominal pain or abdominal bloating  Genitourinary: No changes in urinary habits, no trouble urinating    Psychiatric: No anxiety or depression      Medical History  Surgical History   No past medical history on file. Past Surgical History:   Procedure Laterality Date     ABLATION OF DYSRHYTHMIC FOCUS  05/07/2019    Typical AV moy reentry     BACK SURGERY       BYPASS GRAFT ARTERY CORONARY  1997    Comments: X 2 LIMA to LAD ESPERANZA to RCA     CARDIAC ELECTROPHYSIOLOGY MAPPING AND ABLATION  2010     CV CORONARY ANGIOGRAM N/A 6/29/2018    Procedure: Coronary Angiogram;  Surgeon: Carito Flannery MD;  Location: [facility];  Service:      CV CORONARY ANGIOGRAM N/A 7/5/2018    Procedure: Coronary Angiogram;  Surgeon: Carito Flannery MD;  Location: [facility];  Service:      CV LEFT HEART CATHETERIZATION WITHOUT LEFT VENTRICULOGRAM Left 6/29/2018    Procedure: Left Heart Catheterization Without Left Ventriculogram;  Surgeon: Carito Flannery MD;  Location: [facility];  Service:      EP ABLATION SVT N/A 5/7/2019    Procedure: EP Ablation Supra Ventricular;  Surgeon: Steve Levya MD;  Location: [facility];  Service: Cardiology     HAND SURGERY       HC REPAIR EPIGASTRIC HERNIA,REDUC N/A 7/2/2014    Procedure: EPIGASTRIC INCISIONAL HERNIA REPAIR ;  Surgeon: Daniel Gutierrez MD;  Location: [facility];  Service: General     HERNIA REPAIR       MOHS MICROGRAPHIC PROCEDURE       OR ABLATE HEART DYSRHYTHM FOCUS      Description: Catheter Ablation Atrial Supraventricular Tachycardia;  Proc Date: 04/20/2010;  Comments: AVNRT  cryoablation     TONSILLECTOMY           Family History Social History   Family History   Problem Relation Age of Onset     Hypertension Father         Social History     Tobacco Use     Smoking status: Former Smoker      Quit date: 1973     Years since quittin.4     Smokeless tobacco: Never Used   Substance Use Topics     Alcohol use: Yes     Alcohol/week: 1.0 standard drink     Comment: Alcoholic Drinks/day: daily with dinner     Drug use: No         Medications  Allergies     Current Outpatient Medications:      aspirin 81 MG EC tablet, [ASPIRIN 81 MG EC TABLET] Take 1 tablet (81 mg total) by mouth daily., Disp: , Rfl:      atorvastatin (LIPITOR) 80 MG tablet, [ATORVASTATIN (LIPITOR) 80 MG TABLET] Take 80 mg by mouth daily., Disp: , Rfl:      betamethasone dipropionate (DIPROLENE) 0.05 % cream, [BETAMETHASONE DIPROPIONATE (DIPROLENE) 0.05 % CREAM] Apply 1 application topically 2 (two) times a day as needed., Disp: , Rfl:      desonide (DESOWEN) 0.05 % cream, [DESONIDE (DESOWEN) 0.05 % CREAM] Apply topically 2 (two) times a day as needed., Disp: , Rfl:      fluticasone (FLONASE) 50 mcg/actuation nasal spray, [FLUTICASONE (FLONASE) 50 MCG/ACTUATION NASAL SPRAY] Apply 2 sprays into each nostril every evening. , Disp: , Rfl:      glucosamine sulfate (GLUCOSAMINE) 750 mg Tab, [GLUCOSAMINE SULFATE (GLUCOSAMINE) 750 MG TAB] Take 1,500 mg by mouth daily. , Disp: , Rfl:      ipratropium (ATROVENT) 0.06 % nasal spray, [IPRATROPIUM (ATROVENT) 0.06 % NASAL SPRAY] Apply 2 sprays into each nostril 2 (two) times a day., Disp: , Rfl:      melatonin 3 mg Tab, [MELATONIN 3 MG TAB] Take 6 mg by mouth bedtime. , Disp: , Rfl:      methylcellulose (CITRUCEL), Take 2 g by mouth daily as needed , Disp: , Rfl:      metoprolol tartrate (LOPRESSOR) 25 MG tablet, TAKE 1 TABLET BY MOUTH TWICE A DAY, Disp: 180 tablet, Rfl: 1     multivitamin (MULTIVITAMIN) per tablet, [MULTIVITAMIN (MULTIVITAMIN) PER TABLET] Take 1 tablet by mouth daily., Disp: , Rfl:      nitroglycerin (NITROSTAT) 0.4 MG SL tablet, [NITROGLYCERIN (NITROSTAT) 0.4 MG SL TABLET] Place 0.4 mg under the tongue every 5 (five) minutes as needed for chest pain., Disp: , Rfl:      omeprazole  (PRILOSEC) 20 MG capsule, [OMEPRAZOLE (PRILOSEC) 20 MG CAPSULE] Take 20 mg by mouth daily., Disp: , Rfl:      sodium chloride (OCEAN) 0.65 % nasal spray, Spray 2 sprays into both nostrils 2 times daily Use 30 min before Atrovent spray., Disp: , Rfl:      vitamin A-vitamin C-vit E-min (OCUVITE) Tab tablet, [VITAMIN A-VITAMIN C-VIT E-MIN (OCUVITE) TAB TABLET] Take 1 tablet by mouth daily., Disp: , Rfl:      Allergies   Allergen Reactions     Amiodarone Other (See Comments)     Fatigue, shaking     Amiodarone Analogues [Amiodarone] Muscle Pain (Myalgia)          Lab Results    Chemistry CBC Cardiac Enzymes/BNP/TSH/INR   Recent Labs   Lab Test 02/09/22  0419      POTASSIUM 4.5   CHLORIDE 105   CO2 25   GLC 84   BUN 17   CR 0.93   GFRESTIMATED 81   CHICHO 8.7     Recent Labs   Lab Test 02/09/22  0419 02/08/22  0325 06/14/21  0912   CR 0.93 0.97 0.86          Recent Labs   Lab Test 02/09/22  0419   WBC 7.6   HGB 13.2*   HCT 41.1   MCV 94   *     Recent Labs   Lab Test 02/09/22  0419 02/08/22  0325 05/07/19  0614   HGB 13.2* 13.6 14.2    Recent Labs   Lab Test 02/08/22  1213 02/08/22  0820 02/08/22  0325   TROPONINI 0.03 0.03 0.01     No results for input(s): BNP, NTBNPI, NTBNP in the last 52866 hours.  No results for input(s): TSH in the last 23283 hours.  No results for input(s): INR in the last 06773 hours.      Data Review    ECGs (tracings independently reviewed)  2/8/2022 - SR 78bpm, VA 266ms, borderline inferior Qw    Mobile cardiac telemetry monitoring from 2/9/2022 to 3/8/2022 (monitored duration 26d 7h 40m) (independently reviewed)  Predominant underlying rhythm was sinus rhythm, 50 to 120bpm, average 63bpm  Episodes of nonsustained ectopic atrial rhythm.  1 episode of nonsustained ventricular tachycardia lasting 12 beats, 109bpm.  First degree atrioventricular block present.  There were no pauses noted.  Rare supraventricular ectopic beats (1%).  Occasional premature ventricular contractions (2%).  No  symptoms recorded.      2/8/2022 TTE  There is mild concentric left ventricular hypertrophy.  The visual ejection fraction is 50-55%.  There is moderate inferior wall hypokinesis.  There is mild (1+) mitral regurgitation.  Mild to moderate valvular aortic stenosis.  There is moderate (2+) aortic regurgitation.  There is trace to mild tricuspid regurgitation.  Compared to the prior study dated 8/3/2021, there are changes as noted. The  inferior wall appears hypokinetic on the current study. Minimal progression in  the aortic valve stenosis.    2/9/2022 MPI  Lexiscan stress ECG negative for ischemia  The nuclear stress test is abnormal.  Nuclear images demonstrate a large area of nontransmural infarction involving the inferior to inferolateral wall.  No ischemia noted.  The left ventricular ejection fraction at stress is 60% with inferolateral hypokinesis.  A prior study was conducted on 6/22/2018.  Inferolateral ischemia is not identified on the current study.         Cc: Renetta Crabtree MD, Talisha Peña, NP    Dakota Bhatti MD  5/19/2022  11:36 AM

## 2022-05-19 NOTE — LETTER
2022    Talisha Peña NP, NP  [address] [address]    RE: Damian Butler       Dear Colleague,     I had the pleasure of seeing Damian Butler in the [facility].     [facility]  Cardiac Electrophysiology  [address]  [place] [address]   Office: [phone]  Fax: [phone]     Cardiac Electrophysiology Follow-up Visit    Patient: Damian Butler   : 1940     Referring Provider: Renetta Crabtree MD  Primary Care Provider: Talisha Peña NP    CHIEF COMPLAINT/REASON FOR VISIT  Nonsustained ventricular tachycardia  First degree atrioventricular block  Syncope  History of typical AVNRT status post ablations (2010, 2019)    Assessment/Recommendations   Damian Butler is a 81 year old male with first degree atrioventricular block, CAD with prior non-transmural inferior-inferolateral MI and CABG and PCI, mild-moderate aortic stenosis and moderate aortic regurgitation, history of typical AVNRT status post ablations (2010, 2019), HTN, dyslipidemia referred by Dr. Crabtree for evaluation of syncope and NSVT.    Syncope - single episode, etiology indeterminate, features consistent with vagally mediated (possibly micturition) syncope.  He has preserved LVEF, though with inferior and inferolateral non-transmural scar.  Single 12 beat episode of relatively slow and irregular monomorphic NSVT.  Baseline first degree AV block.  Relationship of both of these to his syncopal episode is indeterminate.  We reviewed NSVT and considerations regarding syncope, co-existing preserved LV function with known LV scar; and we discussed options including expectant management, ILR, EP study for VT/VF inducibility.    At present, he would strongly prefer expectant management, with consideration for ILR implantation in case of recurrent episode.  He will contact us with any recurrent episodes of syncope or with any further questions.       Follow up: as above         History of Present Illness   Damian Butler is a 81 year old male with first degree atrioventricular block, CAD with prior non-transmural inferior-inferolateral MI and CABG and PCI, mild-moderate aortic stenosis and moderate aortic regurgitation, history of typical AVNRT status post ablations (4/2010, 5/7/2019), HTN, dyslipidemia referred by Dr. Crabtree for evaluation of syncope and NSVT.    Mr. Butler notes a long history of arising 2-3 times per night to urinate.  He had an episode of syncope after urinating overnight in the bathroom and was admitted 2/8-9/2022 at [facility] for evaluation - 2/8/2022 TTE showed mild concentric LVH, LVEF 50-55%, inferior hypokinesis; 2/9/2022 MPI showed LVEF 60%, large non-transmural inferior and inferolateral scar; MCT 2/9/2022 to 3/8/2022 showed SR, 12 beat NSVT at 109bpm.      He has not had prior or recurrent syncope, and has not had lightheadedness or near syncope.  He denies chest pain, exertional dyspnea, lower extremity edema.  He is here today with his wife.       Physical Examination  Review of Systems   VITALS: BP (!) 140/70 (BP Location: Right arm, Patient Position: Sitting, Cuff Size: Adult Regular)   Pulse 51   Resp 20   Wt 77.1 kg (170 lb)   BMI 25.47 kg/m      Wt Readings from Last 3 Encounters:   03/24/22 76.2 kg (168 lb)   02/23/22 78.4 kg (172 lb 14.4 oz)   02/09/22 72.8 kg (160 lb 8 oz)     CONSTITUTIONAL: well nourished, comfortable, no distress  EYES:  Conjunctivae pink, sclerae clear.    E/N/T:  Oral mucosa pink  RESPIRATORY:  Respiratory effort is normal  CARDIOVASCULAR:  normal S1 and S2  GASTROINTESTINAL:  Abdomen without masses or tenderness  EXTREMITIES:  No clubbing or cyanosis.    MUSCULOSKELETAL:  Overall grossly normal muscle strength  SKIN:  Overall, skin warm and dry, no lesions.  NEURO/PSYCH:  Oriented x 3 with normal affect.   Constitutional:  No weight loss or loss of appetite    Eyes:  No difficulty with  vision, no double vision, no dry eyes  ENT:  No sore throat, difficulty swallowing; changes in hearing or tinnitus  Cardiovascular: As detailed above  Respiratory:  No cough  Musculoskeletal  No joint pain, muscle aches  Neurologic:  Syncope as above   Hematologic: No easy bruising, excessive bleeding tendency   Gastrointestinal:  No jaundice, abdominal pain or abdominal bloating  Genitourinary: No changes in urinary habits, no trouble urinating    Psychiatric: No anxiety or depression      Medical History  Surgical History   No past medical history on file. Past Surgical History:   Procedure Laterality Date     ABLATION OF DYSRHYTHMIC FOCUS  05/07/2019    Typical AV moy reentry     BACK SURGERY       BYPASS GRAFT ARTERY CORONARY  1997    Comments: X 2 LIMA to LAD ESPERANZA to RCA     CARDIAC ELECTROPHYSIOLOGY MAPPING AND ABLATION  2010     CV CORONARY ANGIOGRAM N/A 6/29/2018    Procedure: Coronary Angiogram;  Surgeon: Carito Flannery MD;  Location: [facility];  Service:      CV CORONARY ANGIOGRAM N/A 7/5/2018    Procedure: Coronary Angiogram;  Surgeon: Carito Flannery MD;  Location: [facility];  Service:      CV LEFT HEART CATHETERIZATION WITHOUT LEFT VENTRICULOGRAM Left 6/29/2018    Procedure: Left Heart Catheterization Without Left Ventriculogram;  Surgeon: Carito Flannery MD;  Location: [facility];  Service:      EP ABLATION SVT N/A 5/7/2019    Procedure: EP Ablation Supra Ventricular;  Surgeon: Steve Leyva MD;  Location: [facility];  Service: Cardiology     HAND SURGERY       HC REPAIR EPIGASTRIC HERNIA,REDUC N/A 7/2/2014    Procedure: EPIGASTRIC INCISIONAL HERNIA REPAIR ;  Surgeon: Daniel Gutierrez MD;  Location: [facility];  Service: General     HERNIA REPAIR       MOHS MICROGRAPHIC PROCEDURE       IN ABLATE HEART DYSRHYTHM FOCUS      Description: Catheter Ablation Atrial Supraventricular Tachycardia;  Proc Date: 04/20/2010;  Comments: AVNRT  cryoablation      TONSILLECTOMY           Family History Social History   Family History   Problem Relation Age of Onset     Hypertension Father         Social History     Tobacco Use     Smoking status: Former Smoker     Quit date: 1973     Years since quittin.4     Smokeless tobacco: Never Used   Substance Use Topics     Alcohol use: Yes     Alcohol/week: 1.0 standard drink     Comment: Alcoholic Drinks/day: daily with dinner     Drug use: No         Medications  Allergies     Current Outpatient Medications:      aspirin 81 MG EC tablet, [ASPIRIN 81 MG EC TABLET] Take 1 tablet (81 mg total) by mouth daily., Disp: , Rfl:      atorvastatin (LIPITOR) 80 MG tablet, [ATORVASTATIN (LIPITOR) 80 MG TABLET] Take 80 mg by mouth daily., Disp: , Rfl:      betamethasone dipropionate (DIPROLENE) 0.05 % cream, [BETAMETHASONE DIPROPIONATE (DIPROLENE) 0.05 % CREAM] Apply 1 application topically 2 (two) times a day as needed., Disp: , Rfl:      desonide (DESOWEN) 0.05 % cream, [DESONIDE (DESOWEN) 0.05 % CREAM] Apply topically 2 (two) times a day as needed., Disp: , Rfl:      fluticasone (FLONASE) 50 mcg/actuation nasal spray, [FLUTICASONE (FLONASE) 50 MCG/ACTUATION NASAL SPRAY] Apply 2 sprays into each nostril every evening. , Disp: , Rfl:      glucosamine sulfate (GLUCOSAMINE) 750 mg Tab, [GLUCOSAMINE SULFATE (GLUCOSAMINE) 750 MG TAB] Take 1,500 mg by mouth daily. , Disp: , Rfl:      ipratropium (ATROVENT) 0.06 % nasal spray, [IPRATROPIUM (ATROVENT) 0.06 % NASAL SPRAY] Apply 2 sprays into each nostril 2 (two) times a day., Disp: , Rfl:      melatonin 3 mg Tab, [MELATONIN 3 MG TAB] Take 6 mg by mouth bedtime. , Disp: , Rfl:      methylcellulose (CITRUCEL), Take 2 g by mouth daily as needed , Disp: , Rfl:      metoprolol tartrate (LOPRESSOR) 25 MG tablet, TAKE 1 TABLET BY MOUTH TWICE A DAY, Disp: 180 tablet, Rfl: 1     multivitamin (MULTIVITAMIN) per tablet, [MULTIVITAMIN (MULTIVITAMIN) PER TABLET] Take 1 tablet by mouth daily., Disp: , Rfl:       nitroglycerin (NITROSTAT) 0.4 MG SL tablet, [NITROGLYCERIN (NITROSTAT) 0.4 MG SL TABLET] Place 0.4 mg under the tongue every 5 (five) minutes as needed for chest pain., Disp: , Rfl:      omeprazole (PRILOSEC) 20 MG capsule, [OMEPRAZOLE (PRILOSEC) 20 MG CAPSULE] Take 20 mg by mouth daily., Disp: , Rfl:      sodium chloride (OCEAN) 0.65 % nasal spray, Spray 2 sprays into both nostrils 2 times daily Use 30 min before Atrovent spray., Disp: , Rfl:      vitamin A-vitamin C-vit E-min (OCUVITE) Tab tablet, [VITAMIN A-VITAMIN C-VIT E-MIN (OCUVITE) TAB TABLET] Take 1 tablet by mouth daily., Disp: , Rfl:      Allergies   Allergen Reactions     Amiodarone Other (See Comments)     Fatigue, shaking     Amiodarone Analogues [Amiodarone] Muscle Pain (Myalgia)          Lab Results    Chemistry CBC Cardiac Enzymes/BNP/TSH/INR   Recent Labs   Lab Test 02/09/22  0419      POTASSIUM 4.5   CHLORIDE 105   CO2 25   GLC 84   BUN 17   CR 0.93   GFRESTIMATED 81   CHICHO 8.7     Recent Labs   Lab Test 02/09/22  0419 02/08/22  0325 06/14/21  0912   CR 0.93 0.97 0.86          Recent Labs   Lab Test 02/09/22  0419   WBC 7.6   HGB 13.2*   HCT 41.1   MCV 94   *     Recent Labs   Lab Test 02/09/22  0419 02/08/22  0325 05/07/19  0614   HGB 13.2* 13.6 14.2    Recent Labs   Lab Test 02/08/22  1213 02/08/22  0820 02/08/22  0325   TROPONINI 0.03 0.03 0.01     No results for input(s): BNP, NTBNPI, NTBNP in the last 97877 hours.  No results for input(s): TSH in the last 92325 hours.  No results for input(s): INR in the last 39305 hours.      Data Review    ECGs (tracings independently reviewed)  2/8/2022 - SR 78bpm, DE 266ms, borderline inferior Qw    Mobile cardiac telemetry monitoring from 2/9/2022 to 3/8/2022 (monitored duration 26d 7h 40m) (independently reviewed)  Predominant underlying rhythm was sinus rhythm, 50 to 120bpm, average 63bpm  Episodes of nonsustained ectopic atrial rhythm.  1 episode of nonsustained ventricular  tachycardia lasting 12 beats, 109bpm.  First degree atrioventricular block present.  There were no pauses noted.  Rare supraventricular ectopic beats (1%).  Occasional premature ventricular contractions (2%).  No symptoms recorded.      2/8/2022 TTE  There is mild concentric left ventricular hypertrophy.  The visual ejection fraction is 50-55%.  There is moderate inferior wall hypokinesis.  There is mild (1+) mitral regurgitation.  Mild to moderate valvular aortic stenosis.  There is moderate (2+) aortic regurgitation.  There is trace to mild tricuspid regurgitation.  Compared to the prior study dated 8/3/2021, there are changes as noted. The  inferior wall appears hypokinetic on the current study. Minimal progression in  the aortic valve stenosis.    2/9/2022 MPI  Lexiscan stress ECG negative for ischemia  The nuclear stress test is abnormal.  Nuclear images demonstrate a large area of nontransmural infarction involving the inferior to inferolateral wall.  No ischemia noted.  The left ventricular ejection fraction at stress is 60% with inferolateral hypokinesis.  A prior study was conducted on 6/22/2018.  Inferolateral ischemia is not identified on the current study.         Cc: Renetta Crabtree MD, Talisha Peña, NP    Dakota Bhatti MD  5/19/2022  11:36 AM          Thank you for allowing me to participate in the care of your patient.      Sincerely,     Dakota Bhatti MD     [facility]  cc:   Renetta Crabtree MD  [address] [place] [id number]

## 2022-05-19 NOTE — PATIENT INSTRUCTIONS
[facility]  Cardiac Electrophysiology  [address]  [place] [address]   Office: [phone]  Fax: [phone]       Thank you for seeing us in clinic today - it is a pleasure to be a part of your care team.  Below is a summary of our plan from today's visit.      Syncope/loss of consciousness - single episode, cause indeterminate.  You have preserved cardiac function, though with cardiac scar from prior heart attack.  You had a single 12 beat episode of relatively slow non-sustained ventricular tachycardia (NSVT).    We reviewed NSVT and considerations regarding syncope, co-existing preserved LV function with known LV scar; and we discussed options including expectant management, ILR, EP study for VT/VF inducibility.    At present, you elected for expectant management, with consideration for ILR implantation or EP study in case of recurrent episode.  Contact us with any recurrent episodes of syncope or with any further questions.      Please do not hesitate to be in touch with our office at [phone] with any questions that may arise.      Thank you for trusting us with your care,    Dakota Bhatti MD  Clinical Cardiac Electrophysiology  [facility]  [address]  [place] [address]   Office: [phone]  Fax: [phone]

## 2022-06-14 ENCOUNTER — LAB REQUISITION (OUTPATIENT)
Dept: LAB | Facility: CLINIC | Age: 82
End: 2022-06-14
Payer: MEDICARE

## 2022-06-14 DIAGNOSIS — I10 ESSENTIAL (PRIMARY) HYPERTENSION: ICD-10-CM

## 2022-06-14 DIAGNOSIS — R20.0 ANESTHESIA OF SKIN: ICD-10-CM

## 2022-06-14 DIAGNOSIS — E78.5 HYPERLIPIDEMIA, UNSPECIFIED: ICD-10-CM

## 2022-06-14 LAB
ALBUMIN SERPL-MCNC: 3.7 G/DL (ref 3.5–5)
ALP SERPL-CCNC: 92 U/L (ref 45–120)
ALT SERPL W P-5'-P-CCNC: 19 U/L (ref 0–45)
ANION GAP SERPL CALCULATED.3IONS-SCNC: 7 MMOL/L (ref 5–18)
AST SERPL W P-5'-P-CCNC: 23 U/L (ref 0–40)
BILIRUB SERPL-MCNC: 1.3 MG/DL (ref 0–1)
BUN SERPL-MCNC: 18 MG/DL (ref 8–28)
CALCIUM SERPL-MCNC: 9.1 MG/DL (ref 8.5–10.5)
CHLORIDE BLD-SCNC: 100 MMOL/L (ref 98–107)
CHOLEST SERPL-MCNC: 122 MG/DL
CO2 SERPL-SCNC: 29 MMOL/L (ref 22–31)
CREAT SERPL-MCNC: 0.88 MG/DL (ref 0.7–1.3)
GFR SERPL CREATININE-BSD FRML MDRD: 85 ML/MIN/1.73M2
GLUCOSE BLD-MCNC: 70 MG/DL (ref 70–125)
HDLC SERPL-MCNC: 44 MG/DL
LDLC SERPL CALC-MCNC: 65 MG/DL
POTASSIUM BLD-SCNC: 4.6 MMOL/L (ref 3.5–5)
PROT SERPL-MCNC: 6.8 G/DL (ref 6–8)
SODIUM SERPL-SCNC: 136 MMOL/L (ref 136–145)
TRIGL SERPL-MCNC: 65 MG/DL
TSH SERPL DL<=0.005 MIU/L-ACNC: 2.71 UIU/ML (ref 0.3–5)
VIT B12 SERPL-MCNC: 1511 PG/ML (ref 213–816)

## 2022-06-14 PROCEDURE — 80061 LIPID PANEL: CPT | Mod: ORL | Performed by: STUDENT IN AN ORGANIZED HEALTH CARE EDUCATION/TRAINING PROGRAM

## 2022-06-14 PROCEDURE — 82607 VITAMIN B-12: CPT | Mod: ORL | Performed by: STUDENT IN AN ORGANIZED HEALTH CARE EDUCATION/TRAINING PROGRAM

## 2022-06-14 PROCEDURE — 80053 COMPREHEN METABOLIC PANEL: CPT | Mod: ORL | Performed by: STUDENT IN AN ORGANIZED HEALTH CARE EDUCATION/TRAINING PROGRAM

## 2022-06-14 PROCEDURE — 84443 ASSAY THYROID STIM HORMONE: CPT | Mod: ORL | Performed by: STUDENT IN AN ORGANIZED HEALTH CARE EDUCATION/TRAINING PROGRAM

## 2022-08-27 DIAGNOSIS — I10 HYPERTENSION: ICD-10-CM

## 2022-08-29 RX ORDER — METOPROLOL TARTRATE 25 MG/1
TABLET, FILM COATED ORAL
Qty: 180 TABLET | Refills: 1 | Status: SHIPPED | OUTPATIENT
Start: 2022-08-29 | End: 2023-02-27

## 2022-12-30 ENCOUNTER — HOSPITAL ENCOUNTER (EMERGENCY)
Facility: CLINIC | Age: 82
Discharge: HOME | End: 2022-12-30
Attending: EMERGENCY MEDICINE | Admitting: EMERGENCY MEDICINE
Payer: MEDICARE

## 2022-12-30 ENCOUNTER — APPOINTMENT (OUTPATIENT)
Dept: CT IMAGING | Facility: CLINIC | Age: 82
End: 2022-12-30
Attending: EMERGENCY MEDICINE
Payer: MEDICARE

## 2022-12-30 VITALS
DIASTOLIC BLOOD PRESSURE: 87 MMHG | RESPIRATION RATE: 16 BRPM | OXYGEN SATURATION: 95 % | WEIGHT: 165 LBS | SYSTOLIC BLOOD PRESSURE: 190 MMHG | HEIGHT: 69 IN | TEMPERATURE: 98.1 F | HEART RATE: 69 BPM | BODY MASS INDEX: 24.44 KG/M2

## 2022-12-30 DIAGNOSIS — H53.9 VISION CHANGES: ICD-10-CM

## 2022-12-30 PROCEDURE — G1010 CDSM STANSON: HCPCS

## 2022-12-30 PROCEDURE — 99284 EMERGENCY DEPT VISIT MOD MDM: CPT | Mod: 25

## 2022-12-30 ASSESSMENT — ACTIVITIES OF DAILY LIVING (ADL)
ADLS_ACUITY_SCORE: 35
ADLS_ACUITY_SCORE: 35

## 2022-12-30 ASSESSMENT — VISUAL ACUITY
OS: 20/25;WITH CORRECTIVE LENSES
OD: 20/30;WITH CORRECTIVE LENSES

## 2022-12-30 NOTE — ED PROVIDER NOTES
EMERGENCY DEPARTMENT ENCOUNTER      NAME: Damian Butler  AGE: 82 year old male  YOB: 1940  MRN: [id number]  EVALUATION DATE & TIME: 12/30/2022  5:52 PM    PCP: Talisha Peña    ED PROVIDER: Aris Conway M.D.      Chief Complaint   Patient presents with     Eye Problem         FINAL IMPRESSION:  1. Vision changes          ED COURSE & MEDICAL DECISION MAKING:    Pertinent Labs & Imaging studies reviewed. (See chart for details)  82 year old male presents to the Emergency Department for evaluation of vision changes. Patient appears non toxic with stable vitals signs, patient afebrile with no tachycardia or hypoxia, no increased work of breathing.  Lungs clear and abdomen is benign.  Currently at this time patient denies any symptoms whatsoever, again states symptoms were transient only lasting 2 to 3 minutes before completely resolving.  He denies any headache, no vision loss, no true room spinning sensation or vertigo.  He has a GCS of 15 with no focal neurodeficit with certainly no indication for code stroke.  Story is very atypical, certainly considered given the patient's history for ocular migraine though he endorses these vision changes today were completely different.  Considered but overall low suspicion for TIA.  Clinically nothing to suggest acute angle-closure glaucoma, temporal arteritis, retinal detachment, retinal artery or venous occlusion, corneal abrasion, iritis, or other more malicious etiology of symptoms.  Did discuss patient case with neurology, who at this time agrees no indication for code stroke but recommended CT imaging for sensitive rule out acute bleed.  I felt that this was very reasonable given patient's benign exam with no continued symptoms.  Discussed need for CT imaging with the patient who is in agreement.  Visual acuity performed and within acceptable range.    Reassessment: CT imaging reported no acute concerning findings.  Repeat exam is benign and the  patient continues to deny any symptoms.  With negative work-up, benign exam and well appearance certainly feel he is safe for discharge and close follow-up.  Will recommend close follow-up with primary care and his primary ophthalmologist for continued outpatient management evaluation.  Discussed all these findings recommendations with the patient felt reassured and comfortable discharge.  Return precautions provided.    6:06 PM I met with the patient for the initial interview and physical examination. Discussed plan for treatment and workup in the ED. PPE: Provider wore gloves, N95 mask, and eye protection.    6:47 PM Spoke with Napoleon Cox, who reports that the patient's visual acuity is 20/30 and 20/25. He also notes that the patient's systolic blood pressure is in the 200s, and the patient is concerned about this because his blood pressure is never that high.   7:22 PM Spoke with Dr. Dubon, Neurology. She recommends getting a head CT.   7:26 PM Updated patient on Neurology recommendations and need for imaging.   8:26 PM Repeat exam is benign. Discussed results, discharge, and follow up with primary care and opthalmology.     Medical Decision Making    History:    Supplemental history from: Documented in HPI, if applicable    External Record(s) reviewed: Documented in HPI, if applicable.    Work Up:    Chart documentation includes differential considered and any EKGs or imaging independently interpreted by provider.    In additional to work up documented, I considered the following work up: See chart documentation, if applicable.    External consultation:    Discussion of management with another provider: See chart documentation, if applicable    Complicating factors:    Care impacted by chronic illness: N/A    Care affected by social determinants of health: N/A    Disposition considerations: Discharge. No recommendations on prescription strength medication(s). Admission consideration documented above, if  "applicable.        At the conclusion of the encounter I discussed the results of all of the tests and the disposition. The questions were answered and return precautions provided. The patient or family acknowledged understanding and was agreeable with the care plan.         MEDICATIONS GIVEN IN THE EMERGENCY:  Medications - No data to display    NEW PRESCRIPTIONS STARTED AT TODAY'S ER VISIT  Discharge Medication List as of 12/30/2022  8:41 PM               =================================================================    HPI    Patient information was obtained from: the patient     Use of Intrepreter: N/A         Damian Butler is a 82 year old male with a history of hyperlipidemia, hypertension, macular degeneration, and ocular migraines who presents to the ED via walk in for evaluation of vision change.     The patient reports that when he was standing in his kitchen getting ready for lunch this afternoon his vision suddenly started \"rolling around.\" He sat down and after about three minutes this resolved, and have not returned. He is anxious about this, but states he is otherwise at his normal state of health. He denies anything like this ever happening before, or being like his ocular migraines. The patient denies lightheadedness, dizziness, chest pain, vomiting, nausea, diarrhea, fever, cough, headache, vision loss, eye pain or drainage, weakness, and any other symptoms or complaints at this time.     REVIEW OF SYSTEMS   Constitutional:  Denies fever, chills  EENT: Denies vision loss, eye pain, eye drainage. Endorses vision change  Respiratory:  Denies productive cough or increased work of breathing  Cardiovascular:  Denies chest pain, palpitations  GI:  Denies abdominal pain, nausea, vomiting, or change in bowel or bladder habits   Musculoskeletal:  Denies any new muscle/joint swelling  Skin:  Denies rash   Neurologic:  Denies focal weakness, lightheadedness, dizziness, headache   Psych: Endorses " anxiety  All systems negative except as marked.     PAST MEDICAL HISTORY:  History reviewed. No pertinent past medical history.    PAST SURGICAL HISTORY:  Past Surgical History:   Procedure Laterality Date     ABLATION OF DYSRHYTHMIC FOCUS  05/07/2019    Typical AV moy reentry     BACK SURGERY       BYPASS GRAFT ARTERY CORONARY  1997    Comments: X 2 LIMA to LAD ESPERANZA to RCA     CARDIAC ELECTROPHYSIOLOGY MAPPING AND ABLATION  2010     CV CORONARY ANGIOGRAM N/A 6/29/2018    Procedure: Coronary Angiogram;  Surgeon: Carito Flannery MD;  Location: [facility] Cath Lab;  Service:      CV CORONARY ANGIOGRAM N/A 7/5/2018    Procedure: Coronary Angiogram;  Surgeon: Carito Flannery MD;  Location: [facility] Cath Lab;  Service:      CV LEFT HEART CATHETERIZATION WITHOUT LEFT VENTRICULOGRAM Left 6/29/2018    Procedure: Left Heart Catheterization Without Left Ventriculogram;  Surgeon: Carito Flannery MD;  Location: [facility] Cath Lab;  Service:      EP ABLATION SVT N/A 5/7/2019    Procedure: EP Ablation Supra Ventricular;  Surgeon: Steve Leyva MD;  Location: [facility] Cath Lab;  Service: Cardiology     HAND SURGERY       HC REPAIR EPIGASTRIC HERNIA,REDUC N/A 7/2/2014    Procedure: EPIGASTRIC INCISIONAL HERNIA REPAIR ;  Surgeon: Daniel Gutierrez MD;  Location: [facility] OR;  Service: General     HERNIA REPAIR       MOHS MICROGRAPHIC PROCEDURE       MI ABLATE HEART DYSRHYTHM FOCUS      Description: Catheter Ablation Atrial Supraventricular Tachycardia;  Proc Date: 04/20/2010;  Comments: AVNRT  cryoablation     TONSILLECTOMY           CURRENT MEDICATIONS:    Prior to Admission medications    Medication Sig Start Date End Date Taking? Authorizing Provider   aspirin 81 MG EC tablet [ASPIRIN 81 MG EC TABLET] Take 1 tablet (81 mg total) by mouth daily. 6/29/18   Carito Flannery MD   atorvastatin (LIPITOR) 80 MG tablet [ATORVASTATIN (LIPITOR) 80 MG TABLET] Take 80 mg by mouth daily. 6/30/14   Provider, Historical    betamethasone dipropionate (DIPROLENE) 0.05 % cream [BETAMETHASONE DIPROPIONATE (DIPROLENE) 0.05 % CREAM] Apply 1 application topically 2 (two) times a day as needed. 5/20/17   Provider, Historical   desonide (DESOWEN) 0.05 % cream [DESONIDE (DESOWEN) 0.05 % CREAM] Apply topically 2 (two) times a day as needed. 6/27/16   Provider, Historical   fluticasone (FLONASE) 50 mcg/actuation nasal spray [FLUTICASONE (FLONASE) 50 MCG/ACTUATION NASAL SPRAY] Apply 2 sprays into each nostril every evening.  6/25/15   Provider, Historical   glucosamine sulfate (GLUCOSAMINE) 750 mg Tab [GLUCOSAMINE SULFATE (GLUCOSAMINE) 750 MG TAB] Take 1,500 mg by mouth daily.  7/1/14   Provider, Historical   ipratropium (ATROVENT) 0.06 % nasal spray [IPRATROPIUM (ATROVENT) 0.06 % NASAL SPRAY] Apply 2 sprays into each nostril 2 (two) times a day. 3/24/17   Provider, Historical   melatonin 3 mg Tab [MELATONIN 3 MG TAB] Take 6 mg by mouth bedtime.  7/1/14   Provider, Historical   methylcellulose (CITRUCEL) Take 2 g by mouth daily as needed  7/1/14   Provider, Historical   metoprolol tartrate (LOPRESSOR) 25 MG tablet TAKE 1 TABLET BY MOUTH TWICE A DAY 8/29/22   Renetta Crabtree MD   multivitamin (MULTIVITAMIN) per tablet [MULTIVITAMIN (MULTIVITAMIN) PER TABLET] Take 1 tablet by mouth daily. 7/1/14   Provider, Historical   nitroglycerin (NITROSTAT) 0.4 MG SL tablet [NITROGLYCERIN (NITROSTAT) 0.4 MG SL TABLET] Place 0.4 mg under the tongue every 5 (five) minutes as needed for chest pain. 7/1/14   Provider, Historical   omeprazole (PRILOSEC) 20 MG capsule [OMEPRAZOLE (PRILOSEC) 20 MG CAPSULE] Take 20 mg by mouth daily. 6/30/14   Provider, Historical   sodium chloride (OCEAN) 0.65 % nasal spray Spray 2 sprays into both nostrils 2 times daily Use 30 min before Atrovent spray. 3/24/17   Provider, Historical   vitamin A-vitamin C-vit E-min (OCUVITE) Tab tablet [VITAMIN A-VITAMIN C-VIT E-MIN (OCUVITE) TAB TABLET] Take 1 tablet by mouth daily.  "3/24/17   Provider, Historical        ALLERGIES:  Allergies   Allergen Reactions     Amiodarone Other (See Comments)     Fatigue, shaking     Amiodarone Analogues [Amiodarone] Muscle Pain (Myalgia)       FAMILY HISTORY:  Family History   Problem Relation Age of Onset     Hypertension Father        SOCIAL HISTORY:   Social History     Socioeconomic History     Marital status:    Tobacco Use     Smoking status: Former     Types: Cigarettes     Quit date: 1973     Years since quittin.0     Smokeless tobacco: Never   Substance and Sexual Activity     Alcohol use: Yes     Alcohol/week: 1.0 standard drink     Comment: Alcoholic Drinks/day: daily with dinner     Drug use: No       VITALS:  Patient Vitals for the past 24 hrs:   BP Temp Temp src Pulse Resp SpO2 Height Weight   22 -- -- -- 69 -- 95 % -- --   22 (!) 190/87 -- -- 70 -- 95 % -- --   22 1945 (!) 186/81 -- -- 73 -- 94 % -- --   22 193 (!) 169/81 -- -- 70 -- 98 % -- --   22 191 (!) 164/81 -- -- 71 -- 97 % -- --   22 1914 (!) 164/81 -- -- 71 -- 98 % -- --   22 1900 (!) 163/77 -- -- 71 -- 98 % -- --   22 1845 (!) 193/86 -- -- 70 -- 98 % -- --   22 1839 (!) 216/92 -- -- 71 -- 99 % -- --   22 1800 (!) 195/84 -- -- -- -- -- -- --   22 1521 (!) 186/86 98.1  F (36.7  C) Oral 69 16 98 % 1.753 m (5' 9\") 74.8 kg (165 lb)        PHYSICAL EXAM    Constitutional:  Awake, alert, in no apparent distress  HENT:  Normocephalic, Atraumatic. Bilateral external ears normal. Oropharynx moist. Nose normal. Neck- Normal range of motion with no guarding, No midline cervical tenderness, Supple, No stridor.   Eyes:  PERRL, EOMI with no signs of entrapment, Conjunctiva normal, No discharge.  No nystagmus, intact red reflex bilaterally.   Respiratory:  Normal breath sounds, No respiratory distress, No wheezing.    Cardiovascular:  Normal heart rate, Normal rhythm, systolic murmur appreciated  GI:  " Soft, No tenderness, No distension, No palpable masses  Musculoskeletal:  Intact distal pulses, No edema. Good range of motion in all major joints. No tenderness to palpation or major deformities noted.  Integument:  Warm, Dry, No erythema, No rash.   Neurologic:  Alert & oriented, Normal motor function, Normal sensory function, No focal deficits noted.  Cranial nerves II through XII intact, no facial droop or pronator drift.  Psychiatric:  Affect normal, Judgment normal, Mood normal.     LAB:  All pertinent labs reviewed and interpreted.  Results for orders placed or performed during the hospital encounter of 12/30/22   CT Head w/o Contrast    Impression    IMPRESSION:  1.  No CT evidence for acute intracranial process.  2.  Brain atrophy and presumed chronic microvascular ischemic changes as above.       RADIOLOGY:  CT Head w/o Contrast   Final Result   IMPRESSION:   1.  No CT evidence for acute intracranial process.   2.  Brain atrophy and presumed chronic microvascular ischemic changes as above.             EKG:    None.     PROCEDURES:   None.          I, Claudine Russo, am serving as a scribe to document services personally performed by Aris Conway MD, based on my observation and the provider's statements to me. I, Aris Conway MD attest that Claudineliya Dentonton is acting in a scribe capacity, has observed my performance of the services and has documented them in accordance with my direction.    Aris Conway M.D.  Emergency Medicine  [facility] EMERGENCY ROOM  [address] [address]  [phone]  Dept: [phone]      Aris Conway MD  12/30/22 [id number]

## 2022-12-30 NOTE — ED TRIAGE NOTES
"Pt with a history of macular degeneration and ocular migraines presents with a 2 to 3 minute episode of vision \"spinning\".  Symptoms have now resolved.  FAST exam WNL.     Triage Assessment     Row Name 12/30/22 1524       Triage Assessment (Adult)    Airway WDL WDL       Respiratory WDL    Respiratory WDL WDL       Skin Circulation/Temperature WDL    Skin Circulation/Temperature WDL WDL       Cardiac WDL    Cardiac WDL WDL       Peripheral/Neurovascular WDL    Peripheral Neurovascular WDL WDL       Cognitive/Neuro/Behavioral WDL    Cognitive/Neuro/Behavioral WDL WDL              "

## 2023-01-12 ENCOUNTER — LAB REQUISITION (OUTPATIENT)
Dept: LAB | Facility: CLINIC | Age: 83
End: 2023-01-12
Payer: MEDICARE

## 2023-01-12 DIAGNOSIS — E78.5 HYPERLIPIDEMIA, UNSPECIFIED: ICD-10-CM

## 2023-01-12 DIAGNOSIS — I10 ESSENTIAL (PRIMARY) HYPERTENSION: ICD-10-CM

## 2023-01-12 LAB
ANION GAP SERPL CALCULATED.3IONS-SCNC: 13 MMOL/L (ref 7–15)
BUN SERPL-MCNC: 20.4 MG/DL (ref 8–23)
CALCIUM SERPL-MCNC: 9.3 MG/DL (ref 8.8–10.2)
CHLORIDE SERPL-SCNC: 99 MMOL/L (ref 98–107)
CHOLEST SERPL-MCNC: 105 MG/DL
CREAT SERPL-MCNC: 0.94 MG/DL (ref 0.67–1.17)
DEPRECATED HCO3 PLAS-SCNC: 23 MMOL/L (ref 22–29)
GFR SERPL CREATININE-BSD FRML MDRD: 80 ML/MIN/1.73M2
GLUCOSE SERPL-MCNC: 88 MG/DL (ref 70–99)
HDLC SERPL-MCNC: 41 MG/DL
LDLC SERPL CALC-MCNC: 50 MG/DL
NONHDLC SERPL-MCNC: 64 MG/DL
POTASSIUM SERPL-SCNC: 5.2 MMOL/L (ref 3.4–5.3)
SODIUM SERPL-SCNC: 135 MMOL/L (ref 136–145)
TRIGL SERPL-MCNC: 69 MG/DL

## 2023-01-12 PROCEDURE — 80048 BASIC METABOLIC PNL TOTAL CA: CPT | Mod: ORL | Performed by: STUDENT IN AN ORGANIZED HEALTH CARE EDUCATION/TRAINING PROGRAM

## 2023-01-12 PROCEDURE — 80061 LIPID PANEL: CPT | Mod: ORL | Performed by: STUDENT IN AN ORGANIZED HEALTH CARE EDUCATION/TRAINING PROGRAM

## 2023-02-27 DIAGNOSIS — I10 HYPERTENSION: ICD-10-CM

## 2023-02-27 RX ORDER — METOPROLOL TARTRATE 25 MG/1
TABLET, FILM COATED ORAL
Qty: 180 TABLET | Refills: 0 | Status: SHIPPED | OUTPATIENT
Start: 2023-02-27 | End: 2023-05-30

## 2023-04-22 ENCOUNTER — HOSPITAL ENCOUNTER (OUTPATIENT)
Facility: CLINIC | Age: 83
Setting detail: OBSERVATION
Discharge: HOME | End: 2023-04-25
Attending: EMERGENCY MEDICINE | Admitting: FAMILY MEDICINE
Payer: MEDICARE

## 2023-04-22 ENCOUNTER — APPOINTMENT (OUTPATIENT)
Dept: RADIOLOGY | Facility: CLINIC | Age: 83
End: 2023-04-22
Attending: EMERGENCY MEDICINE
Payer: MEDICARE

## 2023-04-22 DIAGNOSIS — R55 SYNCOPE, UNSPECIFIED SYNCOPE TYPE: ICD-10-CM

## 2023-04-22 DIAGNOSIS — U07.1 INFECTION DUE TO 2019 NOVEL CORONAVIRUS: Primary | ICD-10-CM

## 2023-04-22 DIAGNOSIS — I25.10 CORONARY ARTERY DISEASE INVOLVING NATIVE CORONARY ARTERY OF NATIVE HEART WITHOUT ANGINA PECTORIS: ICD-10-CM

## 2023-04-22 LAB
ALBUMIN SERPL-MCNC: 3.3 G/DL (ref 3.5–5)
ALP SERPL-CCNC: 100 U/L (ref 45–120)
ALT SERPL W P-5'-P-CCNC: 20 U/L (ref 0–45)
ANION GAP SERPL CALCULATED.3IONS-SCNC: 10 MMOL/L (ref 5–18)
AST SERPL W P-5'-P-CCNC: 33 U/L (ref 0–40)
ATRIAL RATE - MUSE: 69 BPM
BASOPHILS # BLD AUTO: 0 10E3/UL (ref 0–0.2)
BASOPHILS NFR BLD AUTO: 0 %
BILIRUB SERPL-MCNC: 0.9 MG/DL (ref 0–1)
BUN SERPL-MCNC: 21 MG/DL (ref 8–28)
CALCIUM SERPL-MCNC: 8.1 MG/DL (ref 8.5–10.5)
CHLORIDE BLD-SCNC: 97 MMOL/L (ref 98–107)
CO2 SERPL-SCNC: 21 MMOL/L (ref 22–31)
CREAT SERPL-MCNC: 0.85 MG/DL (ref 0.7–1.3)
DIASTOLIC BLOOD PRESSURE - MUSE: 64 MMHG
EOSINOPHIL # BLD AUTO: 0 10E3/UL (ref 0–0.7)
EOSINOPHIL NFR BLD AUTO: 0 %
ERYTHROCYTE [DISTWIDTH] IN BLOOD BY AUTOMATED COUNT: 13.2 % (ref 10–15)
GFR SERPL CREATININE-BSD FRML MDRD: 86 ML/MIN/1.73M2
GLUCOSE BLD-MCNC: 116 MG/DL (ref 70–125)
HCT VFR BLD AUTO: 39 % (ref 40–53)
HGB BLD-MCNC: 13.1 G/DL (ref 13.3–17.7)
IMM GRANULOCYTES # BLD: 0 10E3/UL
IMM GRANULOCYTES NFR BLD: 1 %
INTERPRETATION ECG - MUSE: NORMAL
LYMPHOCYTES # BLD AUTO: 0.7 10E3/UL (ref 0.8–5.3)
LYMPHOCYTES NFR BLD AUTO: 11 %
MCH RBC QN AUTO: 30.6 PG (ref 26.5–33)
MCHC RBC AUTO-ENTMCNC: 33.6 G/DL (ref 31.5–36.5)
MCV RBC AUTO: 91 FL (ref 78–100)
MONOCYTES # BLD AUTO: 0.7 10E3/UL (ref 0–1.3)
MONOCYTES NFR BLD AUTO: 12 %
NEUTROPHILS # BLD AUTO: 4.6 10E3/UL (ref 1.6–8.3)
NEUTROPHILS NFR BLD AUTO: 76 %
NRBC # BLD AUTO: 0 10E3/UL
NRBC BLD AUTO-RTO: 0 /100
P AXIS - MUSE: 68 DEGREES
PLATELET # BLD AUTO: 102 10E3/UL (ref 150–450)
POTASSIUM BLD-SCNC: 4.3 MMOL/L (ref 3.5–5)
PR INTERVAL - MUSE: 248 MS
PROT SERPL-MCNC: 6.7 G/DL (ref 6–8)
QRS DURATION - MUSE: 100 MS
QT - MUSE: 412 MS
QTC - MUSE: 441 MS
R AXIS - MUSE: 78 DEGREES
RBC # BLD AUTO: 4.28 10E6/UL (ref 4.4–5.9)
SODIUM SERPL-SCNC: 128 MMOL/L (ref 136–145)
SYSTOLIC BLOOD PRESSURE - MUSE: 133 MMHG
T AXIS - MUSE: 33 DEGREES
TROPONIN I SERPL-MCNC: 0.12 NG/ML (ref 0–0.29)
VENTRICULAR RATE- MUSE: 69 BPM
WBC # BLD AUTO: 6.1 10E3/UL (ref 4–11)

## 2023-04-22 PROCEDURE — 71045 X-RAY EXAM CHEST 1 VIEW: CPT

## 2023-04-22 PROCEDURE — 96360 HYDRATION IV INFUSION INIT: CPT

## 2023-04-22 PROCEDURE — 258N000003 HC RX IP 258 OP 636: Performed by: EMERGENCY MEDICINE

## 2023-04-22 PROCEDURE — 93005 ELECTROCARDIOGRAM TRACING: CPT | Performed by: EMERGENCY MEDICINE

## 2023-04-22 PROCEDURE — G0378 HOSPITAL OBSERVATION PER HR: HCPCS

## 2023-04-22 PROCEDURE — 96361 HYDRATE IV INFUSION ADD-ON: CPT

## 2023-04-22 PROCEDURE — 99223 1ST HOSP IP/OBS HIGH 75: CPT | Mod: AI | Performed by: INTERNAL MEDICINE

## 2023-04-22 PROCEDURE — 36415 COLL VENOUS BLD VENIPUNCTURE: CPT | Performed by: EMERGENCY MEDICINE

## 2023-04-22 PROCEDURE — 84484 ASSAY OF TROPONIN QUANT: CPT | Performed by: EMERGENCY MEDICINE

## 2023-04-22 PROCEDURE — 80053 COMPREHEN METABOLIC PANEL: CPT | Performed by: EMERGENCY MEDICINE

## 2023-04-22 PROCEDURE — C9803 HOPD COVID-19 SPEC COLLECT: HCPCS

## 2023-04-22 PROCEDURE — 99285 EMERGENCY DEPT VISIT HI MDM: CPT | Mod: 25

## 2023-04-22 PROCEDURE — 85025 COMPLETE CBC W/AUTO DIFF WBC: CPT | Performed by: EMERGENCY MEDICINE

## 2023-04-22 RX ORDER — SODIUM CHLORIDE 9 MG/ML
INJECTION, SOLUTION INTRAVENOUS CONTINUOUS
Status: DISCONTINUED | OUTPATIENT
Start: 2023-04-22 | End: 2023-04-25

## 2023-04-22 RX ORDER — ATORVASTATIN CALCIUM 40 MG/1
80 TABLET, FILM COATED ORAL DAILY
Status: DISCONTINUED | OUTPATIENT
Start: 2023-04-23 | End: 2023-04-25 | Stop reason: HOSPADM

## 2023-04-22 RX ORDER — ONDANSETRON 2 MG/ML
4 INJECTION INTRAMUSCULAR; INTRAVENOUS EVERY 6 HOURS PRN
Status: DISCONTINUED | OUTPATIENT
Start: 2023-04-22 | End: 2023-04-24

## 2023-04-22 RX ORDER — METOPROLOL TARTRATE 25 MG/1
25 TABLET, FILM COATED ORAL 2 TIMES DAILY
Status: DISCONTINUED | OUTPATIENT
Start: 2023-04-23 | End: 2023-04-25 | Stop reason: HOSPADM

## 2023-04-22 RX ORDER — ONDANSETRON 4 MG/1
4 TABLET, ORALLY DISINTEGRATING ORAL EVERY 6 HOURS PRN
Status: DISCONTINUED | OUTPATIENT
Start: 2023-04-22 | End: 2023-04-24

## 2023-04-22 RX ORDER — ACETAMINOPHEN 325 MG/1
650 TABLET ORAL EVERY 4 HOURS PRN
Status: DISCONTINUED | OUTPATIENT
Start: 2023-04-22 | End: 2023-04-25 | Stop reason: HOSPADM

## 2023-04-22 RX ORDER — FLUTICASONE PROPIONATE 50 MCG
2 SPRAY, SUSPENSION (ML) NASAL EVERY EVENING
Status: DISCONTINUED | OUTPATIENT
Start: 2023-04-22 | End: 2023-04-25 | Stop reason: HOSPADM

## 2023-04-22 RX ORDER — ACETAMINOPHEN 650 MG/1
650 SUPPOSITORY RECTAL EVERY 6 HOURS PRN
Status: DISCONTINUED | OUTPATIENT
Start: 2023-04-22 | End: 2023-04-25 | Stop reason: HOSPADM

## 2023-04-22 RX ORDER — ASPIRIN 81 MG/1
81 TABLET ORAL DAILY
Status: DISCONTINUED | OUTPATIENT
Start: 2023-04-23 | End: 2023-04-25 | Stop reason: HOSPADM

## 2023-04-22 RX ORDER — IPRATROPIUM BROMIDE 42 UG/1
2 SPRAY, METERED NASAL 2 TIMES DAILY
Status: DISCONTINUED | OUTPATIENT
Start: 2023-04-23 | End: 2023-04-25 | Stop reason: HOSPADM

## 2023-04-22 RX ORDER — SODIUM CHLORIDE 9 MG/ML
INJECTION, SOLUTION INTRAVENOUS CONTINUOUS
Status: DISCONTINUED | OUTPATIENT
Start: 2023-04-22 | End: 2023-04-22

## 2023-04-22 RX ORDER — PANTOPRAZOLE SODIUM 40 MG/1
40 TABLET, DELAYED RELEASE ORAL DAILY
Status: DISCONTINUED | OUTPATIENT
Start: 2023-04-23 | End: 2023-04-25 | Stop reason: HOSPADM

## 2023-04-22 RX ADMIN — SODIUM CHLORIDE: 9 INJECTION, SOLUTION INTRAVENOUS at 22:06

## 2023-04-22 RX ADMIN — SODIUM CHLORIDE 500 ML: 9 INJECTION, SOLUTION INTRAVENOUS at 20:13

## 2023-04-22 ASSESSMENT — ACTIVITIES OF DAILY LIVING (ADL)
ADLS_ACUITY_SCORE: 35
ADLS_ACUITY_SCORE: 35

## 2023-04-23 LAB
ALBUMIN SERPL-MCNC: 3 G/DL (ref 3.5–5)
ALP SERPL-CCNC: 101 U/L (ref 45–120)
ALT SERPL W P-5'-P-CCNC: 18 U/L (ref 0–45)
ANION GAP SERPL CALCULATED.3IONS-SCNC: 10 MMOL/L (ref 5–18)
AST SERPL W P-5'-P-CCNC: 26 U/L (ref 0–40)
BASOPHILS # BLD AUTO: 0 10E3/UL (ref 0–0.2)
BASOPHILS NFR BLD AUTO: 0 %
BILIRUB SERPL-MCNC: 0.7 MG/DL (ref 0–1)
BUN SERPL-MCNC: 15 MG/DL (ref 8–28)
C PNEUM DNA SPEC QL NAA+PROBE: NOT DETECTED
C REACTIVE PROTEIN LHE: 1.9 MG/DL (ref 0–?)
CALCIUM SERPL-MCNC: 7.9 MG/DL (ref 8.5–10.5)
CHLORIDE BLD-SCNC: 101 MMOL/L (ref 98–107)
CO2 SERPL-SCNC: 21 MMOL/L (ref 22–31)
CREAT SERPL-MCNC: 0.71 MG/DL (ref 0.7–1.3)
D DIMER PPP FEU-MCNC: 0.43 UG/ML FEU (ref 0–0.5)
D DIMER PPP FEU-MCNC: 0.51 UG/ML FEU (ref 0–0.5)
EOSINOPHIL # BLD AUTO: 0 10E3/UL (ref 0–0.7)
EOSINOPHIL NFR BLD AUTO: 0 %
ERYTHROCYTE [DISTWIDTH] IN BLOOD BY AUTOMATED COUNT: 13.2 % (ref 10–15)
FLUAV H1 2009 PAND RNA SPEC QL NAA+PROBE: NOT DETECTED
FLUAV H1 RNA SPEC QL NAA+PROBE: NOT DETECTED
FLUAV H3 RNA SPEC QL NAA+PROBE: NOT DETECTED
FLUAV RNA SPEC QL NAA+PROBE: NOT DETECTED
FLUBV RNA SPEC QL NAA+PROBE: NOT DETECTED
GFR SERPL CREATININE-BSD FRML MDRD: 90 ML/MIN/1.73M2
GLUCOSE BLD-MCNC: 104 MG/DL (ref 70–125)
HADV DNA SPEC QL NAA+PROBE: NOT DETECTED
HCOV PNL SPEC NAA+PROBE: NOT DETECTED
HCT VFR BLD AUTO: 38.2 % (ref 40–53)
HGB BLD-MCNC: 13 G/DL (ref 13.3–17.7)
HMPV RNA SPEC QL NAA+PROBE: NOT DETECTED
HPIV1 RNA SPEC QL NAA+PROBE: NOT DETECTED
HPIV2 RNA SPEC QL NAA+PROBE: NOT DETECTED
HPIV3 RNA SPEC QL NAA+PROBE: NOT DETECTED
HPIV4 RNA SPEC QL NAA+PROBE: NOT DETECTED
IMM GRANULOCYTES # BLD: 0 10E3/UL
IMM GRANULOCYTES NFR BLD: 0 %
LYMPHOCYTES # BLD AUTO: 0.8 10E3/UL (ref 0.8–5.3)
LYMPHOCYTES NFR BLD AUTO: 14 %
M PNEUMO DNA SPEC QL NAA+PROBE: NOT DETECTED
MCH RBC QN AUTO: 30.7 PG (ref 26.5–33)
MCHC RBC AUTO-ENTMCNC: 34 G/DL (ref 31.5–36.5)
MCV RBC AUTO: 90 FL (ref 78–100)
MONOCYTES # BLD AUTO: 0.3 10E3/UL (ref 0–1.3)
MONOCYTES NFR BLD AUTO: 5 %
NEUTROPHILS # BLD AUTO: 4.6 10E3/UL (ref 1.6–8.3)
NEUTROPHILS NFR BLD AUTO: 81 %
NRBC # BLD AUTO: 0 10E3/UL
NRBC BLD AUTO-RTO: 0 /100
OSMOLALITY SERPL: 271 MMOL/KG (ref 280–301)
OSMOLALITY UR: 644 MMOL/KG (ref 100–1200)
PLATELET # BLD AUTO: 101 10E3/UL (ref 150–450)
POTASSIUM BLD-SCNC: 3.8 MMOL/L (ref 3.5–5)
PROCALCITONIN SERPL-MCNC: 0.04 NG/ML (ref 0–0.49)
PROT SERPL-MCNC: 6.2 G/DL (ref 6–8)
RBC # BLD AUTO: 4.24 10E6/UL (ref 4.4–5.9)
RSV RNA SPEC QL NAA+PROBE: NOT DETECTED
RSV RNA SPEC QL NAA+PROBE: NOT DETECTED
RV+EV RNA SPEC QL NAA+PROBE: NOT DETECTED
SARS-COV-2 RNA RESP QL NAA+PROBE: POSITIVE
SODIUM SERPL-SCNC: 130 MMOL/L (ref 136–145)
SODIUM SERPL-SCNC: 132 MMOL/L (ref 136–145)
SODIUM UR-SCNC: 59 MMOL/L
TROPONIN I SERPL-MCNC: 0.11 NG/ML (ref 0–0.29)
WBC # BLD AUTO: 5.7 10E3/UL (ref 4–11)

## 2023-04-23 PROCEDURE — G0378 HOSPITAL OBSERVATION PER HR: HCPCS

## 2023-04-23 PROCEDURE — 250N000013 HC RX MED GY IP 250 OP 250 PS 637: Performed by: INTERNAL MEDICINE

## 2023-04-23 PROCEDURE — 85379 FIBRIN DEGRADATION QUANT: CPT | Performed by: INTERNAL MEDICINE

## 2023-04-23 PROCEDURE — 84145 PROCALCITONIN (PCT): CPT | Performed by: INTERNAL MEDICINE

## 2023-04-23 PROCEDURE — 36415 COLL VENOUS BLD VENIPUNCTURE: CPT | Performed by: INTERNAL MEDICINE

## 2023-04-23 PROCEDURE — 96361 HYDRATE IV INFUSION ADD-ON: CPT

## 2023-04-23 PROCEDURE — 258N000003 HC RX IP 258 OP 636: Performed by: INTERNAL MEDICINE

## 2023-04-23 PROCEDURE — 80053 COMPREHEN METABOLIC PANEL: CPT | Performed by: INTERNAL MEDICINE

## 2023-04-23 PROCEDURE — 84300 ASSAY OF URINE SODIUM: CPT | Performed by: INTERNAL MEDICINE

## 2023-04-23 PROCEDURE — 96375 TX/PRO/DX INJ NEW DRUG ADDON: CPT

## 2023-04-23 PROCEDURE — 86140 C-REACTIVE PROTEIN: CPT | Performed by: INTERNAL MEDICINE

## 2023-04-23 PROCEDURE — 96372 THER/PROPH/DIAG INJ SC/IM: CPT | Mod: XS | Performed by: INTERNAL MEDICINE

## 2023-04-23 PROCEDURE — U0005 INFEC AGEN DETEC AMPLI PROBE: HCPCS | Performed by: INTERNAL MEDICINE

## 2023-04-23 PROCEDURE — 99233 SBSQ HOSP IP/OBS HIGH 50: CPT | Performed by: STUDENT IN AN ORGANIZED HEALTH CARE EDUCATION/TRAINING PROGRAM

## 2023-04-23 PROCEDURE — 85025 COMPLETE CBC W/AUTO DIFF WBC: CPT | Performed by: INTERNAL MEDICINE

## 2023-04-23 PROCEDURE — 84484 ASSAY OF TROPONIN QUANT: CPT | Performed by: INTERNAL MEDICINE

## 2023-04-23 PROCEDURE — 250N000011 HC RX IP 250 OP 636: Performed by: INTERNAL MEDICINE

## 2023-04-23 PROCEDURE — 84295 ASSAY OF SERUM SODIUM: CPT | Performed by: INTERNAL MEDICINE

## 2023-04-23 PROCEDURE — 83930 ASSAY OF BLOOD OSMOLALITY: CPT | Performed by: INTERNAL MEDICINE

## 2023-04-23 PROCEDURE — 96365 THER/PROPH/DIAG IV INF INIT: CPT

## 2023-04-23 PROCEDURE — 87486 CHLMYD PNEUM DNA AMP PROBE: CPT | Performed by: INTERNAL MEDICINE

## 2023-04-23 PROCEDURE — 83935 ASSAY OF URINE OSMOLALITY: CPT | Performed by: INTERNAL MEDICINE

## 2023-04-23 RX ORDER — LANOLIN ALCOHOL/MO/W.PET/CERES
6 CREAM (GRAM) TOPICAL
Status: DISCONTINUED | OUTPATIENT
Start: 2023-04-23 | End: 2023-04-25 | Stop reason: HOSPADM

## 2023-04-23 RX ORDER — ENOXAPARIN SODIUM 100 MG/ML
40 INJECTION SUBCUTANEOUS EVERY 24 HOURS
Status: DISCONTINUED | OUTPATIENT
Start: 2023-04-23 | End: 2023-04-25 | Stop reason: HOSPADM

## 2023-04-23 RX ORDER — LIDOCAINE 40 MG/G
CREAM TOPICAL
Status: DISCONTINUED | OUTPATIENT
Start: 2023-04-23 | End: 2023-04-25 | Stop reason: HOSPADM

## 2023-04-23 RX ORDER — DEXAMETHASONE SODIUM PHOSPHATE 10 MG/ML
6 INJECTION, SOLUTION INTRAMUSCULAR; INTRAVENOUS DAILY
Status: DISCONTINUED | OUTPATIENT
Start: 2023-04-24 | End: 2023-04-24

## 2023-04-23 RX ORDER — DEXAMETHASONE SODIUM PHOSPHATE 10 MG/ML
6 INJECTION, SOLUTION INTRAMUSCULAR; INTRAVENOUS ONCE
Status: COMPLETED | OUTPATIENT
Start: 2023-04-23 | End: 2023-04-23

## 2023-04-23 RX ADMIN — SODIUM CHLORIDE: 9 INJECTION, SOLUTION INTRAVENOUS at 15:04

## 2023-04-23 RX ADMIN — ACETAMINOPHEN 650 MG: 325 TABLET ORAL at 04:34

## 2023-04-23 RX ADMIN — SODIUM CHLORIDE: 9 INJECTION, SOLUTION INTRAVENOUS at 00:18

## 2023-04-23 RX ADMIN — PANTOPRAZOLE SODIUM 40 MG: 40 TABLET, DELAYED RELEASE ORAL at 08:39

## 2023-04-23 RX ADMIN — ENOXAPARIN SODIUM 40 MG: 100 INJECTION SUBCUTANEOUS at 03:59

## 2023-04-23 RX ADMIN — SALINE NASAL SPRAY 2 SPRAY: 1.5 SOLUTION NASAL at 08:41

## 2023-04-23 RX ADMIN — IPRATROPIUM BROMIDE 2 SPRAY: 42 SPRAY NASAL at 08:41

## 2023-04-23 RX ADMIN — SODIUM CHLORIDE 50 ML: 9 INJECTION, SOLUTION INTRAVENOUS at 04:29

## 2023-04-23 RX ADMIN — REMDESIVIR 200 MG: 100 INJECTION, POWDER, LYOPHILIZED, FOR SOLUTION INTRAVENOUS at 03:53

## 2023-04-23 RX ADMIN — Medication 6 MG: at 21:27

## 2023-04-23 RX ADMIN — FLUTICASONE PROPIONATE 2 SPRAY: 50 SPRAY, METERED NASAL at 21:01

## 2023-04-23 RX ADMIN — FLUTICASONE PROPIONATE 2 SPRAY: 50 SPRAY, METERED NASAL at 00:18

## 2023-04-23 RX ADMIN — DEXAMETHASONE SODIUM PHOSPHATE 6 MG: 10 INJECTION, SOLUTION INTRAMUSCULAR; INTRAVENOUS at 03:56

## 2023-04-23 RX ADMIN — IPRATROPIUM BROMIDE 2 SPRAY: 42 SPRAY NASAL at 20:08

## 2023-04-23 RX ADMIN — SALINE NASAL SPRAY 2 SPRAY: 1.5 SOLUTION NASAL at 20:08

## 2023-04-23 RX ADMIN — ASPIRIN 81 MG: 81 TABLET, COATED ORAL at 08:39

## 2023-04-23 RX ADMIN — ATORVASTATIN CALCIUM 80 MG: 40 TABLET, FILM COATED ORAL at 08:40

## 2023-04-23 RX ADMIN — METOPROLOL TARTRATE 25 MG: 25 TABLET, FILM COATED ORAL at 08:40

## 2023-04-23 RX ADMIN — METOPROLOL TARTRATE 25 MG: 25 TABLET, FILM COATED ORAL at 20:07

## 2023-04-23 ASSESSMENT — ACTIVITIES OF DAILY LIVING (ADL)
ADLS_ACUITY_SCORE: 22
DEPENDENT_IADLS:: INDEPENDENT
ADLS_ACUITY_SCORE: 35
ADLS_ACUITY_SCORE: 37
ADLS_ACUITY_SCORE: 22
ADLS_ACUITY_SCORE: 37
ADLS_ACUITY_SCORE: 37
ADLS_ACUITY_SCORE: 26
ADLS_ACUITY_SCORE: 35
ADLS_ACUITY_SCORE: 22
ADLS_ACUITY_SCORE: 22

## 2023-04-23 NOTE — ED PROVIDER NOTES
EMERGENCY DEPARTMENT ENCOUNTER            IMPRESSION:  Syncope  Hyponatremia  Dehydration        MEDICAL DECISION MAKING:  It was my pleasure to provide care for Damian Butler who presented for evaluation of witnessed syncope.  He has a history of coronary artery disease and SVT    On my exam patient is pleasant and cooperative.  No evidence of distress.  Vital signs are normal.  Physical exam notable for normal cardiopulmonary exam.     IV fluid administered for symptom relief.  Patient's symptoms improved.     EKG does not show any acute arrhythmia or ischemia    Significant laboratory findings include hyponatremia and baseline anemia, troponin is measurable but not elevated    Chest x-ray imaging performed and results are pending.  Imaging independently reviewed and agree with radiologist findings.     ED evaluation is consistent with unprovoked syncope.  Patient is at overall high risk.  I am concerned that he could have had a cardiac event and is at risk for sudden.      Patient was reevaluated and results were discussed.  I recommended admission to the hospital    =================================================================  CHIEF COMPLAINT:  Chief Complaint   Patient presents with     Syncope     Patient arrive with EMS coming from home stated wife call 911 because patient passed out. Patient stated he was feeling lightheaded and warm has had a cold for the past week.          HPI  Damian Butler is a 82 year old male with a history of hyperlipidemia, CAD, COPD, and hypertension who presents to the ED by EMS with for evaluation after syncope.     The patient reports that after drinking a glass of wine and eating a few chips with his wife this evening he began to feel very diaphoretic and lightheaded, causing him to sit down. He was talking with his wife while sitting, and had a syncopal episode. He notes that he has a cold which is lingering. He endorses a cough, fatigue, and generalized weakness from  that. He states that he is not drinking enough water. He also endorses bilateral leg swelling. The patient denies chest pain, shortness of breath, recent medication changes, and any other symptoms or complaints at this time.     REVIEW OF SYSTEMS   Constitutional: Does not report chills, unintentional weight loss. Reports diaphoresis, fatigue  Eyes: Does not report visual changes or discharge    HENT: Does not report sore throat, ear pain or neck pain  Respiratory: Does not report shortness of breath. Reports cough   Cardiovascular: Does not report chest pain, palpitations. Reports leg swelling bilaterally  GI: Does not report abdominal pain, nausea, vomiting, or dark, bloody stools.    : Does not report hematuria, dysuria, or flank pain  Musculoskeletal: Does not report any new musculoskeletal pain or new muscle/joint pains  Skin: Does not report rash or wound  Neurologic: Does not report current headache, focal weakness, or sensory changes. Reports lightheadedness, syncope, generalized weakness      Remainder of systems reviewed, unless noted in HPI all others negative.      PAST MEDICAL HISTORY:  History reviewed. No pertinent past medical history.    PAST SURGICAL HISTORY:  Past Surgical History:   Procedure Laterality Date     ABLATION OF DYSRHYTHMIC FOCUS  05/07/2019    Typical AV moy reentry     BACK SURGERY       BYPASS GRAFT ARTERY CORONARY  1997    Comments: X 2 LIMA to LAD ESPERANZA to RCA     CARDIAC ELECTROPHYSIOLOGY MAPPING AND ABLATION  2010     CV CORONARY ANGIOGRAM N/A 6/29/2018    Procedure: Coronary Angiogram;  Surgeon: Carito Flannery MD;  Location: [facility] Lab;  Service:      CV CORONARY ANGIOGRAM N/A 7/5/2018    Procedure: Coronary Angiogram;  Surgeon: Carito Flannery MD;  Location: [facility] Lab;  Service:      CV LEFT HEART CATHETERIZATION WITHOUT LEFT VENTRICULOGRAM Left 6/29/2018    Procedure: Left Heart Catheterization Without Left Ventriculogram;  Surgeon: Carito Flannery  MD;  Location: [facility] Cath Lab;  Service:      EP ABLATION SVT N/A 2019    Procedure: EP Ablation Supra Ventricular;  Surgeon: Steve Leyva MD;  Location: [facility] Cath Lab;  Service: Cardiology     HAND SURGERY       HERNIA REPAIR       MOHS MICROGRAPHIC PROCEDURE       FL ABLATE HEART DYSRHYTHM FOCUS      Description: Catheter Ablation Atrial Supraventricular Tachycardia;  Proc Date: 2010;  Comments: AVNRT  cryoablation     TONSILLECTOMY       ZZHC REPAIR EPIGASTRIC HERNIA,REDUC N/A 2014    Procedure: EPIGASTRIC INCISIONAL HERNIA REPAIR ;  Surgeon: Daniel Gutierrez MD;  Location: [facility];  Service: General         CURRENT MEDICATIONS:    aspirin 81 MG EC tablet  atorvastatin (LIPITOR) 80 MG tablet  betamethasone dipropionate (DIPROLENE) 0.05 % cream  desonide (DESOWEN) 0.05 % cream  fluticasone (FLONASE) 50 mcg/actuation nasal spray  glucosamine sulfate (GLUCOSAMINE) 750 mg Tab  ipratropium (ATROVENT) 0.06 % nasal spray  melatonin 3 mg Tab  methylcellulose (CITRUCEL)  metoprolol tartrate (LOPRESSOR) 25 MG tablet  multivitamin (MULTIVITAMIN) per tablet  nitroglycerin (NITROSTAT) 0.4 MG SL tablet  omeprazole (PRILOSEC) 20 MG capsule  sodium chloride (OCEAN) 0.65 % nasal spray  vitamin A-vitamin C-vit E-min (OCUVITE) Tab tablet        ALLERGIES:  Allergies   Allergen Reactions     Amiodarone Other (See Comments)     Fatigue, shaking     Amiodarone Analogues [Amiodarone] Muscle Pain (Myalgia)       FAMILY HISTORY:  Family History   Problem Relation Age of Onset     Hypertension Father        SOCIAL HISTORY:   Social History     Socioeconomic History     Marital status:    Tobacco Use     Smoking status: Former     Types: Cigarettes     Quit date: 1973     Years since quittin.3     Smokeless tobacco: Never   Substance and Sexual Activity     Alcohol use: Yes     Alcohol/week: 1.0 standard drink of alcohol     Comment: Alcoholic Drinks/day: daily with dinner     Drug  use: No       PHYSICAL EXAM:    BP (!) 146/70   Pulse 65   Temp 97.6  F (36.4  C) (Oral)   Resp 16   SpO2 95%     Constitutional: Awake, alert, no evidence of distress  Head: Normocephalic, atraumatic.  ENT: Mucous membranes are moist.  No pallor.   Eyes: Pupils are reactive.  No discoloration.  Neck: No lymphadenopathy, no stridor, supple, no soft tissue swelling  Chest: No tenderness   Respiratory: Respirations even, unlabored. Lungs clear to ascultation bilaterally, in no acute respiratory distress.  Cardiovascular: Regular rate and rhythm.  Good overall perfusion.  Upper and lower extremity pulses are equal.  GI: Abdomen soft, non-tender to palpation.  No guarding or rebound. Bowel sounds present throughout.   Back: No CVA tenderness.    Musculoskeletal: Moves all 4 extremities equally, full function and capacity no peripheral edema.   Integument: Warm, dry. No rash. No bruising or petechiae.  Neurologic: Alert & oriented x 3. Normal speech. Grossly normal motor and sensory function. No focal deficits noted.  NIHSS = 0  Psychiatric: Normal mood and affect.  Appropriate judgement.    ED COURSE:  7:53 PM I met with the patient for the initial interview and physical examination. Discussed plan for treatment and workup in the ED. PPE: Provider wore gloves, and paper mask.    9:17 PM I discussed the case with hospitalist, Dr Jean Baptiste, who accepts the patient  9:35 PM I rechecked and updated the patient. I discussed admission with the patient. Patient is agreeable. All questions answered fully.     Medical Decision Making    History:    Supplemental history from: Paramedics    External Record(s) reviewed: External medical records including care everywhere reviewed    Work Up:    EKG, laboratory and imaging studies as ordered were independently reviewed by the provider    Broad differential diagnosis considered for syncope    The patient's presentation was of high complexity.     External  consultation:    Discussion of management with another provider: Hospitalist    Complicating factors:    Patient has a complicated past medical history including hyperlipidemia, CAD, COPD, and hypertension     Care affected by social determinants of health: Access to primary care    Disposition involved shared decision-making with the patient.  The patient's management necessitated high risk regarding consideration for hospitalization     Admission to the hospital.      LAB:  Laboratory results were independently reviewed and interpreted  Results for orders placed or performed during the hospital encounter of 04/22/23   Comprehensive metabolic panel   Result Value Ref Range    Sodium 128 (L) 136 - 145 mmol/L    Potassium 4.3 3.5 - 5.0 mmol/L    Chloride 97 (L) 98 - 107 mmol/L    Carbon Dioxide (CO2) 21 (L) 22 - 31 mmol/L    Anion Gap 10 5 - 18 mmol/L    Urea Nitrogen 21 8 - 28 mg/dL    Creatinine 0.85 0.70 - 1.30 mg/dL    Calcium 8.1 (L) 8.5 - 10.5 mg/dL    Glucose 116 70 - 125 mg/dL    Alkaline Phosphatase 100 45 - 120 U/L    AST 33 0 - 40 U/L    ALT 20 0 - 45 U/L    Protein Total 6.7 6.0 - 8.0 g/dL    Albumin 3.3 (L) 3.5 - 5.0 g/dL    Bilirubin Total 0.9 0.0 - 1.0 mg/dL    GFR Estimate 86 >60 mL/min/1.73m2   Result Value Ref Range    Troponin I 0.12 0.00 - 0.29 ng/mL   CBC with platelets and differential   Result Value Ref Range    WBC Count 6.1 4.0 - 11.0 10e3/uL    RBC Count 4.28 (L) 4.40 - 5.90 10e6/uL    Hemoglobin 13.1 (L) 13.3 - 17.7 g/dL    Hematocrit 39.0 (L) 40.0 - 53.0 %    MCV 91 78 - 100 fL    MCH 30.6 26.5 - 33.0 pg    MCHC 33.6 31.5 - 36.5 g/dL    RDW 13.2 10.0 - 15.0 %    Platelet Count 102 (L) 150 - 450 10e3/uL    % Neutrophils 76 %    % Lymphocytes 11 %    % Monocytes 12 %    % Eosinophils 0 %    % Basophils 0 %    % Immature Granulocytes 1 %    NRBCs per 100 WBC 0 <1 /100    Absolute Neutrophils 4.6 1.6 - 8.3 10e3/uL    Absolute Lymphocytes 0.7 (L) 0.8 - 5.3 10e3/uL    Absolute Monocytes 0.7 0.0  - 1.3 10e3/uL    Absolute Eosinophils 0.0 0.0 - 0.7 10e3/uL    Absolute Basophils 0.0 0.0 - 0.2 10e3/uL    Absolute Immature Granulocytes 0.0 <=0.4 10e3/uL    Absolute NRBCs 0.0 10e3/uL   ECG 12-LEAD WITH MUSE (LHE)   Result Value Ref Range    Systolic Blood Pressure 133 mmHg    Diastolic Blood Pressure 64 mmHg    Ventricular Rate 69 BPM    Atrial Rate 69 BPM    MO Interval 248 ms    QRS Duration 100 ms     ms    QTc 441 ms    P Axis 68 degrees    R AXIS 78 degrees    T Axis 33 degrees    Interpretation ECG       Sinus rhythm with 1st degree A-V block  Possible Left atrial enlargement  Borderline ECG  When compared with ECG of 08-FEB-2022 03:00,  No significant change was found  Confirmed by SEE ED PROVIDER NOTE FOR, ECG INTERPRETATION ([id number]),  KATTY AGUILAR ([id number]) on 4/22/2023 8:59:25 PM           RADIOLOGY:  Radiology reports were independently reviewed and interpreted  XR Chest Port 1 View    (Results Pending)        EKG:    Performed at: 20:23  Impression: Sinus rhythm with 1st A-V block. Possible left atrial enlargement. Abnormal ECG.     Rate: 69 BPM  MO Interval: 248 ms  QRS Duration: 100 ms  QTc Interval: 441 ms    Comparison: When compared with ECG of 08-FEB-2022 no significant change was found.     I have independently reviewed and interpreted the EKG(s) documented above.        MEDICATIONS GIVEN IN THE EMERGENCY:  Medications   0.9% sodium chloride BOLUS (0 mLs Intravenous Stopped 4/22/23 2122)     Followed by   sodium chloride 0.9% infusion (has no administration in time range)           NEW PRESCRIPTIONS STARTED AT TODAY'S ER VISIT:  New Prescriptions    No medications on file                FINAL DIAGNOSIS:    ICD-10-CM    1. Syncope, unspecified syncope type  R55                  NAME: Damian Butler  AGE: 82 year old male  YOB: 1940  MRN: [id number]  EVALUATION DATE & TIME: 4/22/2023  7:27 PM    PCP: Talisha Peña    ED PROVIDER: MARCELA Del Real Sarah  Karan, am serving as a scribe to document services personally performed by Dr. Giuseppe Davenport based on my observation and the provider's statements to me. I, Giuseppe Davenport MD attest that Claudine Karan is acting in a scribe capacity, has observed my performance of the services and has documented them in accordance with my direction.    Giuseppe Davenport M.D.  Emergency Medicine  [facility] EMERGENCY ROOM  [address] [address]  [phone]  Dept: [phone]  4/22/2023        Giuseppe Davenport MD  04/22/23 [id number]

## 2023-04-23 NOTE — PROGRESS NOTES
PRIMARY DIAGNOSIS: Syncope/COVID 19  OUTPATIENT/OBSERVATION GOALS TO BE MET BEFORE DISCHARGE:  1. Dyspnea improved and O2 sats >88% on RA or back to baseline O2 levels: No   SpO2: 96 %, O2 Device: 2.5-3L via Nasal cannula    2. Tolerating oral abx or appropriate plans made outpatient infusion: Remdesivir ordered Q24    3. Vitals signs normal or return to baseline: Increased temp overnight. PRN tylenol given. Temp WNL this morning.    4. Short term supplemental O2 needed with activity at home: N/A at this time    5. Tolerate oral intake to maintain hydration: Yes    6. Return to near baseline physical activity: Generalized weakness

## 2023-04-23 NOTE — CONSULTS
Care Management Initial Consult    General Information  Assessment completed with: Patient,    Type of CM/SW Visit: Initial Assessment    Primary Care Provider verified and updated as needed: Yes   Readmission within the last 30 days: no previous admission in last 30 days      Reason for Consult: discharge planning  Advance Care Planning: Advance Care Planning Reviewed: other (see comments) (Declined Honoring Choices)     General Information Comments: Covid ++    Communication Assessment  Patient's communication style: spoken language (English or Bilingual)    Hearing Difficulty or Deaf: no   Wear Glasses or Blind: yes    Cognitive  Cognitive/Neuro/Behavioral: WDL                      Living Environment:   People in home: spouse     Current living Arrangements: house      Able to return to prior arrangements: yes  Living Arrangement Comments: Lives with wife Herminia    Family/Social Support:  Care provided by: self  Provides care for: no one  Marital Status:   Wife  Herminia       Description of Support System: Supportive, Involved    Support Assessment: Adequate family and caregiver support    Current Resources:   Patient receiving home care services: No     Community Resources: None  Equipment currently used at home: none  Supplies currently used at home: None    Employment/Financial:  Employment Status: retired        Financial Concerns: No concerns identified         Does the patient's insurance plan have a 3 day qualifying hospital stay waiver?      Lifestyle & Psychosocial Needs:  Social Determinants of Health     Tobacco Use: Medium Risk (4/22/2023)    Patient History      Smoking Tobacco Use: Former      Smokeless Tobacco Use: Never      Passive Exposure: Not on file   Alcohol Use: Not on file   Financial Resource Strain: Not on file   Food Insecurity: Not on file   Transportation Needs: Not on file   Physical Activity: Not on file   Stress: Not on file   Social Connections: Not on file   Intimate  Partner Violence: Not on file   Depression: Not at risk (3/24/2022)    PHQ-2      PHQ-2 Score: 0   Housing Stability: Not on file       Functional Status:  Prior to admission patient needed assistance:   Dependent ADLs:: Independent  Dependent IADLs:: Independent  Assesssment of Functional Status: Not at  functional baseline    Mental Health Status:  Mental Health Status: No Current Concerns       Chemical Dependency Status:  Chemical Dependency Status: No Current Concerns             Values/Beliefs:  Spiritual, Cultural Beliefs, Mu-ism Practices, Values that affect care:               Additional Information:   Patient presented for evaluation of witnessed syncope.  He has a history of coronary artery disease and SVT.  No evidence of distress.  Vital signs are normal.  Physical exam notable for normal cardiopulmonary exam. IV fluid administered for symptom relief.  Patient's symptoms improved. EKG does not show any acute arrhythmia or ischemia. Significant laboratory findings include hyponatremia and baseline anemia, troponin is measurable but not elevated. ED evaluation is consistent with unprovoked syncope.  Patient is at overall high risk.  I am concerned that he could have had a cardiac event and is at risk for sudden.  I recommended admission to the hospital .    Information gathered from patient via phone in ED #5 due to Covid+. Lives with wife Herminia in a private residence. Reports independence with I/ADLs; uses no assistive devices; no home care services; does drive. Declined Honoring Choices. Explained ORTIZ/Observation Status to patient, answered questions to his satisfaction. Plans to return home with wife Herminia transporting patient on discharge. Other needs pending clinical progress.      ARLINE ROSE, RN/CM

## 2023-04-23 NOTE — PROGRESS NOTES
PRIMARY DIAGNOSIS: Syncope/COVID 19  OUTPATIENT/OBSERVATION GOALS TO BE MET BEFORE DISCHARGE:  1. Dyspnea improved and O2 sats >88% on RA or back to baseline O2 levels: Yes   SpO2: 96 %, O2 Device: None (Room air)    2. Tolerating oral abx or appropriate plans made outpatient infusion: Remdesivir    3. Vitals signs normal or return to baseline: Yes    4. Short term supplemental O2 needed with activity at home: N/A    5. Tolerate oral intake to maintain hydration: Yes    6. Return to near baseline physical activity: Generalized weakness. Up with SBA.

## 2023-04-23 NOTE — PHARMACY-ADMISSION MEDICATION HISTORY
Pharmacist Admission Medication History    Admission medication history is complete. The information provided in this note is only as accurate as the sources available at the time of the update.    Medication reconciliation/reorder completed by provider prior to medication history? No    Information Source(s): Patient and CareEverywhere/SureScripts via in-person    Pertinent Information:     Changes made to PTA medication list:    Added: None    Deleted: desonide cream, multivitamin    Changed: None    Medication Affordability:  Not including over the counter (OTC) medications, was there a time in the past 12 months when you did not take your medications as prescribed because of cost?: No    Allergies reviewed with patient and updates made in EHR: yes    Medication History Completed By: Komal Solomon [facility] 4/22/2023 9:48 PM    Prior to Admission medications    Medication Sig Last Dose Taking? Auth Provider Long Term End Date   aspirin 81 MG EC tablet [ASPIRIN 81 MG EC TABLET] Take 1 tablet (81 mg total) by mouth daily. 4/22/2023 Yes Carito Flannery MD     atorvastatin (LIPITOR) 80 MG tablet [ATORVASTATIN (LIPITOR) 80 MG TABLET] Take 80 mg by mouth daily. 4/22/2023 Yes Provider, Historical     betamethasone dipropionate (DIPROLENE) 0.05 % cream [BETAMETHASONE DIPROPIONATE (DIPROLENE) 0.05 % CREAM] Apply 1 application topically 2 (two) times a day as needed.  Yes Provider, Historical     fluticasone (FLONASE) 50 mcg/actuation nasal spray [FLUTICASONE (FLONASE) 50 MCG/ACTUATION NASAL SPRAY] Apply 2 sprays into each nostril every evening.  4/21/2023 Yes Provider, Historical     glucosamine sulfate (GLUCOSAMINE) 750 mg Tab [GLUCOSAMINE SULFATE (GLUCOSAMINE) 750 MG TAB] Take 1,500 mg by mouth daily.  4/22/2023 Yes Provider, Historical     ipratropium (ATROVENT) 0.06 % nasal spray [IPRATROPIUM (ATROVENT) 0.06 % NASAL SPRAY] Apply 2 sprays into each nostril 2 (two) times a day. 4/22/2023 at pm Yes Provider, Historical      melatonin 3 mg Tab [MELATONIN 3 MG TAB] Take 6 mg by mouth bedtime.  4/21/2023 Yes Provider, Historical     methylcellulose (CITRUCEL) Take 2 g by mouth daily as needed   Yes Provider, Historical     metoprolol tartrate (LOPRESSOR) 25 MG tablet TAKE 1 TABLET BY MOUTH TWICE A DAY  Patient taking differently: Take 25 mg by mouth 2 times daily 4/22/2023 at pm Yes Renetta Crabtree MD Yes    nitroglycerin (NITROSTAT) 0.4 MG SL tablet [NITROGLYCERIN (NITROSTAT) 0.4 MG SL TABLET] Place 0.4 mg under the tongue every 5 (five) minutes as needed for chest pain.  Yes Provider, Historical     omeprazole (PRILOSEC) 20 MG capsule [OMEPRAZOLE (PRILOSEC) 20 MG CAPSULE] Take 20 mg by mouth daily. 4/22/2023 Yes Provider, Historical     sodium chloride (OCEAN) 0.65 % nasal spray Spray 2 sprays into both nostrils 2 times daily Use 30 min before Atrovent spray. 4/22/2023 at pm Yes Provider, Historical     vitamin A-vitamin C-vit E-min (OCUVITE) Tab tablet [VITAMIN A-VITAMIN C-VIT E-MIN (OCUVITE) TAB TABLET] Take 1 tablet by mouth daily. 4/22/2023 Yes Provider, Historical

## 2023-04-23 NOTE — H&P
"[facility] MEDICINE ADMISSION HISTORY AND PHYSICAL       Assessment & Plan      1. Syncope - recurrent issue -- unclear if all events are vagally mediated (clinic report, one event happened with micturition).     Today, Na is 128 -- and his Na 3 months ago was 135 -- not sure if dehydration is playing a role. Reported poor oral intake. And had some soft stools. He had been having URI symptoms for a week no. No CP or SOB.    No recent syncope prior to this. Not sure if above condition sufficient to explain recurrence of syncope. He also had urinary incontinence. But no confusion after syncope --- r/o seizure    Had prior 26 days of Mobile cardiac telemetry monitoring - did not seem to show cause of syncope.    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      - Tele, neuro checks, orthostatics  - Cardiology ref - may need loop recorder, not sure if current condition is still vagally driven or there are underlying rhythm issues.   - repeat ECHO - given mod aortic stenosis, a year ago   - urine na, osms  - pulse ox, COVID test     2. History of CAD/CABG, coronary stents --- Nuclear perfusion imaging study 9 February 2022 revealed a large area of non-transmural infarction involving the inferior to inferolateral wall, but no evidence of ischemia.   - trops and tele    3. HTN and dyslipidemia  - PTA meds    3A - I did inform him of COVID (+). His O2 sat is only 91% on RA. He is not in distress at rest. We did talk about treatment with steroid and remdesivir - and he is OK with it. \"I wanna be treated\". We did talk about B/R/C. He has no renal failure and no liver failure        350A -- Would cancel cardiology eval for now - given several med issues above -- will have AM doc eval need during " "this stay. His syncope maybe related to dehydration, COVID infection, etc        VTE prophylaxis: SCDs,  if staying more than 24 hours, consider adding subcutaneous lovenox or heparin.   Diet:  Cardiac   Code Status: Full   COVID vaccination: yes  Barriers to discharge: admitting clinical condition  Discharge Disposition and goals:  Unable to determine at this point, pending clinical progress and response to treatment. Patient may need transfer to SNF or [facility] if unsafe to go home and needed treatment inappropriate at home setting OR may need home health care evaluation if care can be delivered at home settings. Consider referral to care manager/    PPE - I was wearing PPE when I met the patient including but not limited to - N95 mask, Gloves, and/or Safety glasses.      Care plan was created based on available information and patient's condition at the time of encounter. This was discussed with the patient and/or family members using layman's terms, including counseling/education and they have agreed to proceed. I recommend to revise care plan and to review history if there is change in condition and/or new clinical information that is not available during my encounter. At the end of night shift, this case will be presented to the AM Hospitalist.         80 minutes spent by me on the date of service doing chart review, history, exam, diagnostic test results interpretation, documentation & further activities per the note.        Regino Jean Baptiste MD, MPH, FACP, [facility]  Internal Medicine - Hospitalist        Chief Complaint Passed out      HISTORY     - I met him in ED-5. Met his family too. He is here because he passed out. He sees cardiology for recurrent syncope.  - He had some alcohol tonight, and felt diaphoretic and lightheaded before he passed out. No urinary incontinence. It was reported he passed out in \"seconds\". He had some urinary incontinence, not confused after regaining consciousness  - No focal " deficits. Feels he is coughing and has congestion for a week now. He feels and had poor intake. He denies CP or SOB. Also reported some loose stools but  Not belly pain or vomiting.    - Last Feb 2022 - had Mobile cardiac telemetry monitoring from 2/9/2022 to 3/8/2022 (monitored duration 26d 7h 40m) --- Impression -- Sinus rhythm without sustained tachyarrhythmia or significant bradyarrhythmia.  Occasional premature ventricular contractions, single episode of nonsustained ventricular tachycardia.    - In the ED, Hgb of 13, and has Na of 128 (its 135, 3 months ago).      - ROS --- No headache. No weakness. No CP or SOB. No palpitations. No abdominal pain. No nausea or vomiting. No urinary symptoms. No bleeding symptoms. No weight loss. Rest of 12 point ROS was reviewed and negative.       Past Medical History     History reviewed. No pertinent past medical history.      Surgical History     Past Surgical History:   Procedure Laterality Date     ABLATION OF DYSRHYTHMIC FOCUS  05/07/2019    Typical AV moy reentry     BACK SURGERY       BYPASS GRAFT ARTERY CORONARY  1997    Comments: X 2 LIMA to LAD ESPERANZA to RCA     CARDIAC ELECTROPHYSIOLOGY MAPPING AND ABLATION  2010     CV CORONARY ANGIOGRAM N/A 6/29/2018    Procedure: Coronary Angiogram;  Surgeon: Carito Flannery MD;  Location: [facility] Cath Lab;  Service:      CV CORONARY ANGIOGRAM N/A 7/5/2018    Procedure: Coronary Angiogram;  Surgeon: Carito Flannery MD;  Location: [facility] Cath Lab;  Service:      CV LEFT HEART CATHETERIZATION WITHOUT LEFT VENTRICULOGRAM Left 6/29/2018    Procedure: Left Heart Catheterization Without Left Ventriculogram;  Surgeon: Carito Flannery MD;  Location: [facility] Cath Lab;  Service:      EP ABLATION SVT N/A 5/7/2019    Procedure: EP Ablation Supra Ventricular;  Surgeon: Steve Leyva MD;  Location: [facility] Cath Lab;  Service: Cardiology     HAND SURGERY       HERNIA REPAIR       MOHS MICROGRAPHIC PROCEDURE       NC ABLATE  HEART DYSRHYTHM FOCUS      Description: Catheter Ablation Atrial Supraventricular Tachycardia;  Proc Date: 2010;  Comments: AVNRT  cryoablation     TONSILLECTOMY       ZZHC REPAIR EPIGASTRIC HERNIA,REDUC N/A 2014    Procedure: EPIGASTRIC INCISIONAL HERNIA REPAIR ;  Surgeon: Daniel Gutierrez MD;  Location: [facility];  Service: General        Family History      Family History   Problem Relation Age of Onset     Hypertension Father          Social History      .  Social History     Socioeconomic History     Marital status:      Spouse name: Not on file     Number of children: Not on file     Years of education: Not on file     Highest education level: Not on file   Occupational History     Not on file   Tobacco Use     Smoking status: Former     Types: Cigarettes     Quit date: 1973     Years since quittin.3     Smokeless tobacco: Never   Vaping Use     Vaping status: Not on file   Substance and Sexual Activity     Alcohol use: Yes     Alcohol/week: 1.0 standard drink of alcohol     Comment: Alcoholic Drinks/day: daily with dinner     Drug use: No     Sexual activity: Not on file   Other Topics Concern     Not on file   Social History Narrative     Not on file     Social Determinants of Health     Financial Resource Strain: Not on file   Food Insecurity: Not on file   Transportation Needs: Not on file   Physical Activity: Not on file   Stress: Not on file   Social Connections: Not on file   Intimate Partner Violence: Not on file   Housing Stability: Not on file          Allergies        Allergies   Allergen Reactions     Amiodarone Other (See Comments)     Fatigue, shaking     Amiodarone Analogues [Amiodarone] Muscle Pain (Myalgia)         Prior to Admission Medications      No current facility-administered medications on file prior to encounter.  aspirin 81 MG EC tablet, [ASPIRIN 81 MG EC TABLET] Take 1 tablet (81 mg total) by mouth daily.  atorvastatin (LIPITOR) 80 MG tablet,  [ATORVASTATIN (LIPITOR) 80 MG TABLET] Take 80 mg by mouth daily.  betamethasone dipropionate (DIPROLENE) 0.05 % cream, [BETAMETHASONE DIPROPIONATE (DIPROLENE) 0.05 % CREAM] Apply 1 application topically 2 (two) times a day as needed.  desonide (DESOWEN) 0.05 % cream, [DESONIDE (DESOWEN) 0.05 % CREAM] Apply topically 2 (two) times a day as needed.  fluticasone (FLONASE) 50 mcg/actuation nasal spray, [FLUTICASONE (FLONASE) 50 MCG/ACTUATION NASAL SPRAY] Apply 2 sprays into each nostril every evening.   glucosamine sulfate (GLUCOSAMINE) 750 mg Tab, [GLUCOSAMINE SULFATE (GLUCOSAMINE) 750 MG TAB] Take 1,500 mg by mouth daily.   ipratropium (ATROVENT) 0.06 % nasal spray, [IPRATROPIUM (ATROVENT) 0.06 % NASAL SPRAY] Apply 2 sprays into each nostril 2 (two) times a day.  melatonin 3 mg Tab, [MELATONIN 3 MG TAB] Take 6 mg by mouth bedtime.   methylcellulose (CITRUCEL), Take 2 g by mouth daily as needed   metoprolol tartrate (LOPRESSOR) 25 MG tablet, TAKE 1 TABLET BY MOUTH TWICE A DAY  multivitamin (MULTIVITAMIN) per tablet, [MULTIVITAMIN (MULTIVITAMIN) PER TABLET] Take 1 tablet by mouth daily.  nitroglycerin (NITROSTAT) 0.4 MG SL tablet, [NITROGLYCERIN (NITROSTAT) 0.4 MG SL TABLET] Place 0.4 mg under the tongue every 5 (five) minutes as needed for chest pain.  omeprazole (PRILOSEC) 20 MG capsule, [OMEPRAZOLE (PRILOSEC) 20 MG CAPSULE] Take 20 mg by mouth daily.  sodium chloride (OCEAN) 0.65 % nasal spray, Spray 2 sprays into both nostrils 2 times daily Use 30 min before Atrovent spray.  vitamin A-vitamin C-vit E-min (OCUVITE) Tab tablet, [VITAMIN A-VITAMIN C-VIT E-MIN (OCUVITE) TAB TABLET] Take 1 tablet by mouth daily.            Review of Systems     A 12 point comprehensive review of systems was negative except as noted above in HPI.    PHYSICAL EXAMINATION       Vitals      Vitals: BP (!) 152/71   Pulse 86   Temp 97.6  F (36.4  C) (Oral)   Resp 16   SpO2 97%   BMI= There is no height or weight on file to calculate  BMI.      Examination     General Appearance:  Alert, cooperative, no distress  Head:    Normocephalic, without obvious abnormality, atraumatic  EENT:  PERRL, conjunctiva/corneas clear, EOM's intact.   Neck:   Supple, symmetrical, trachea midline, no adenopathy; no NVE  Back:  Symmetric, no curvature, no CVA tenderness  Chest/Lungs: Clear to auscultation bilaterally, respirations unlabored, No tenderness or deformity. No abdominal breathing or use of accessory muscles.   Heart:    Regular rate and rhythm, S1 and S2 normal, 2/6 SM at LPSB  Abdomen: Soft, non-tender, bowel sounds active all four quadrants, not peritoneal on palpation. Not distended  Extremities:  Extremities normal, atraumatic, no swelling   Skin:  Skin color, texture, turgor normal, no rashes or lesion  Neurologic:  Awake and alert, No lateralizing or localizing signs        Pertinent Lab     Results for orders placed or performed during the hospital encounter of 04/22/23   Comprehensive metabolic panel   Result Value Ref Range    Sodium 128 (L) 136 - 145 mmol/L    Potassium 4.3 3.5 - 5.0 mmol/L    Chloride 97 (L) 98 - 107 mmol/L    Carbon Dioxide (CO2) 21 (L) 22 - 31 mmol/L    Anion Gap 10 5 - 18 mmol/L    Urea Nitrogen 21 8 - 28 mg/dL    Creatinine 0.85 0.70 - 1.30 mg/dL    Calcium 8.1 (L) 8.5 - 10.5 mg/dL    Glucose 116 70 - 125 mg/dL    Alkaline Phosphatase 100 45 - 120 U/L    AST 33 0 - 40 U/L    ALT 20 0 - 45 U/L    Protein Total 6.7 6.0 - 8.0 g/dL    Albumin 3.3 (L) 3.5 - 5.0 g/dL    Bilirubin Total 0.9 0.0 - 1.0 mg/dL    GFR Estimate 86 >60 mL/min/1.73m2   Result Value Ref Range    Troponin I 0.12 0.00 - 0.29 ng/mL   CBC with platelets and differential   Result Value Ref Range    WBC Count 6.1 4.0 - 11.0 10e3/uL    RBC Count 4.28 (L) 4.40 - 5.90 10e6/uL    Hemoglobin 13.1 (L) 13.3 - 17.7 g/dL    Hematocrit 39.0 (L) 40.0 - 53.0 %    MCV 91 78 - 100 fL    MCH 30.6 26.5 - 33.0 pg    MCHC 33.6 31.5 - 36.5 g/dL    RDW 13.2 10.0 - 15.0 %     Platelet Count 102 (L) 150 - 450 10e3/uL    % Neutrophils 76 %    % Lymphocytes 11 %    % Monocytes 12 %    % Eosinophils 0 %    % Basophils 0 %    % Immature Granulocytes 1 %    NRBCs per 100 WBC 0 <1 /100    Absolute Neutrophils 4.6 1.6 - 8.3 10e3/uL    Absolute Lymphocytes 0.7 (L) 0.8 - 5.3 10e3/uL    Absolute Monocytes 0.7 0.0 - 1.3 10e3/uL    Absolute Eosinophils 0.0 0.0 - 0.7 10e3/uL    Absolute Basophils 0.0 0.0 - 0.2 10e3/uL    Absolute Immature Granulocytes 0.0 <=0.4 10e3/uL    Absolute NRBCs 0.0 10e3/uL   ECG 12-LEAD WITH MUSE (LHE)   Result Value Ref Range    Systolic Blood Pressure 133 mmHg    Diastolic Blood Pressure 64 mmHg    Ventricular Rate 69 BPM    Atrial Rate 69 BPM    MD Interval 248 ms    QRS Duration 100 ms     ms    QTc 441 ms    P Axis 68 degrees    R AXIS 78 degrees    T Axis 33 degrees    Interpretation ECG       Sinus rhythm with 1st degree A-V block  Possible Left atrial enlargement  Borderline ECG  When compared with ECG of 08-FEB-2022 03:00,  No significant change was found  Confirmed by SEE ED PROVIDER NOTE FOR, ECG INTERPRETATION ([id number]),  KATTY AGUILAR ([id number]) on 4/22/2023 8:59:25 PM             Pertinent Radiology

## 2023-04-23 NOTE — PROGRESS NOTES
PRIMARY DIAGNOSIS: SYNCOPE/TIA  OUTPATIENT/OBSERVATION GOALS TO BE MET BEFORE DISCHARGE:  1. Orthostatic performed: Yes:          Lying Orthostatic BP: 130/60         Sitting Orthostatic BP: 126/66         Standing Orthostatic BP: 135/63     2. Diagnostic testing complete & at baseline neurologic testing: No    3. Cleared by consultants (if involved): N/A    4. Interpretation of cardiac rhythm per telemetry tech: NSR with 1st deg AVB    5. Tolerating adequate PO diet and medications: Yes    6. Return to near baseline physical activity or neurologic status: No    Discharge Planner Nurse   Safe discharge environment identified: No  Barriers to discharge: Yes       Entered by: Siva Bragg RN 04/23/2023 5:21 PM      Pt up to floor from ED this afternoon. No acute changes this shift. Remains in NSR on tele with 1st deg AVB. No episodes of dizziness reported, ambulating to bathroom with standby assist. Denies any pain. Does report some GUERRERO, complains of congested cough. O2 sats normal on room air. NS infusing at 50 mL / hr.

## 2023-04-23 NOTE — PROGRESS NOTES
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  Who presented to the hospital on 4/21 after syncope episode.  He was found to have COVID-19 infection.      #Syncope  -Symptoms suggestive of COVID-19 infection over the last week  -Decreased p.o. intake  -Patient was standing in his kitchen when he felt warm and shortly after lost consciousness.   -No chest pain or hypertension.  -No symptoms of seizures  -The most likely etiology is syncope due to dehydration particularly given history of moderate aortic stenosis.  Cannot completely rule out cardiac arrhythmia.  Patient was hospitalized in February 2022 for syncope most likely due to vasovagal event.  However, discharged on ambulatory heart monitor which detected NSVT.  -Evaluated by electrophysiology on 5/22--> discussed NSVT treatment including ILR implantation or EP study--> patient elected expectant management.    Plan:  [] Orthostatic vitals  [] Continue normal saline at 50 cc/h.  [] UA.  [] Follow-up with transthoracic echocardiogram  [] Supportive management of COVID-19 infection.  [] Continue telemetry (if there is any signs of NSVT will consult cardiology)  [] Continue metoprolol.       #COVID-19 infection  #Hyponatremia  -Runny nose, sore throat and diarrhea for the last few days.  Possible exposure around a week ago.  -Fully vaccinated against COVID-19.  -Chest x-ray on presentation without any signs of pneumonia.  -Started on dexamethasone and remdesivir on presentation (dexamethasone for possible hypoxia)  -Mild hyponatremia possibly due to dehydration.    Plan:  [] Weaned off nasal cannula, patient saturating well.  []  Continue remdesivir and dexamethasone for now.      #Coronary artery disease  -MI 1997  -He underwent 2-vessel bypass surgery with tiny graft to the LAD and ESPERANZA graft to the RCA.   -Damian underwent repeat angiography and on 5 July 2018 had successful PCI of distal right coronary artery via ESPERANZA graft with placement of 3 drug-eluting stents (3.5×28 mm, 3.5×24 mm, and 3.0×12 mm Synergy drug-eluting stents).    -Nuclear perfusion imaging study 9 February 2022 revealed a large area of non-transmural infarction involving the inferior to inferolateral wall, but no evidence of ischemia.     Plan:  [] Continue aspirin  [] Continue atorvastatin 80 mg daily  [] Continue metoprolol 25 mg twice daily      #History of supraventricular tachycardia  -History of supraventricular tachycardia. 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  Patient underwent repeat ablation 7 May 2019.  He states he has had no subjective recurrence since his last procedure.  He is very pleased with how he is performing in this regard.     Plan:  [] If any cardiac arrhythmia on telemetry we will consult cardiology.         Diet: Combination Diet Regular Diet Adult    DVT Prophylaxis: Low Risk/Ambulatory with no VTE prophylaxis indicated  Marin Catheter: Not present  Lines: None     Cardiac Monitoring: ACTIVE order. Indication: Syncope- low cardiac risk (24 hours)  Code Status: Full Code      Clinically Significant Risk Factors Present on Admission         # Hyponatremia: Lowest Na = 128 mmol/L in last 2 days, will monitor as appropriate      # Hypoalbuminemia: Lowest albumin = 3 g/dL at 4/23/2023  6:56 AM, will monitor as appropriate   # Thrombocytopenia: Lowest platelets = 101 in last 2 days, will monitor for bleeding                Disposition Plan Pending further evaluation     Expected Discharge Date: 04/24/2023      Destination: home with family            GORDON PADMINI,  MD  Hospitalist Service  [facility]  Securely message with [employer] (more info)  Text page via [employer] Paging/Directory   ______________________________________________________________________    Interval History   No significant events.  Patient is feeling better.  He denies any chest pain or heart palpitation.  He denies any shortness of breath.     Physical Exam   Vital Signs: Temp: 98  F (36.7  C) Temp src: Oral BP: 138/63 Pulse: 74   Resp: 20 SpO2: 95 % O2 Device: None (Room air) Oxygen Delivery: 3 LPM  Weight: 167 lbs 1.6 oz    Physical Exam  Constitutional:       General: He is not in acute distress.     Appearance: He is not toxic-appearing.   Cardiovascular:      Rate and Rhythm: Normal rate.   Pulmonary:      Effort: No respiratory distress.      Breath sounds: No wheezing.   Skin:     General: Skin is warm and dry.   Neurological:      Mental Status: He is alert.   Psychiatric:         Mood and Affect: Mood normal.         Medical Decision Making       55 MINUTES SPENT BY ME on the date of service doing chart review, history, exam, documentation & further activities per the note.      Data   Imaging results reviewed over the past 24 hrs:   Recent Results (from the past 24 hour(s))   XR Chest Port 1 View    Narrative    EXAM: XR CHEST PORT 1 VIEW  LOCATION: [facility]  DATE/TIME: 4/22/2023 9:48 PM CDT    INDICATION: Syncope.  COMPARISON: 05/20/2017      Impression    IMPRESSION: Previous sternotomy. Heart size magnified in AP projection with normal vascularity. Shallow ventilation accentuates bibasilar subsegmental atelectasis. No focal consolidation, pneumothorax nor pleural effusion.     Recent Labs   Lab 04/23/23  0656 04/23/23  0015 04/22/23 2012   WBC 5.7  --  6.1   HGB 13.0*  --  13.1*   MCV 90  --  91   *  --  102*   * 130* 128*   POTASSIUM 3.8  --  4.3   CHLORIDE 101  --  97*   CO2 21*  --  21*   BUN 15  --  21   CR 0.71  --  0.85    ANIONGAP 10  --  10   CHICHO 7.9*  --  8.1*     --  116   ALBUMIN 3.0*  --  3.3*   PROTTOTAL 6.2  --  6.7   BILITOTAL 0.7  --  0.9   ALKPHOS 101  --  100   ALT 18  --  20   AST 26  --  33

## 2023-04-24 ENCOUNTER — APPOINTMENT (OUTPATIENT)
Dept: CARDIOLOGY | Facility: CLINIC | Age: 83
End: 2023-04-24
Attending: STUDENT IN AN ORGANIZED HEALTH CARE EDUCATION/TRAINING PROGRAM
Payer: MEDICARE

## 2023-04-24 LAB
ANION GAP SERPL CALCULATED.3IONS-SCNC: 8 MMOL/L (ref 5–18)
BUN SERPL-MCNC: 20 MG/DL (ref 8–28)
C REACTIVE PROTEIN LHE: 1 MG/DL (ref 0–?)
CALCIUM SERPL-MCNC: 7.7 MG/DL (ref 8.5–10.5)
CHLORIDE BLD-SCNC: 104 MMOL/L (ref 98–107)
CO2 SERPL-SCNC: 22 MMOL/L (ref 22–31)
CREAT SERPL-MCNC: 0.74 MG/DL (ref 0.7–1.3)
D DIMER PPP FEU-MCNC: 0.41 UG/ML FEU (ref 0–0.5)
ERYTHROCYTE [DISTWIDTH] IN BLOOD BY AUTOMATED COUNT: 13.2 % (ref 10–15)
GFR SERPL CREATININE-BSD FRML MDRD: 89 ML/MIN/1.73M2
GLUCOSE BLD-MCNC: 122 MG/DL (ref 70–125)
HCT VFR BLD AUTO: 35.3 % (ref 40–53)
HGB BLD-MCNC: 12 G/DL (ref 13.3–17.7)
LVEF ECHO: NORMAL
MAGNESIUM SERPL-MCNC: 1.7 MG/DL (ref 1.8–2.6)
MCH RBC QN AUTO: 30.8 PG (ref 26.5–33)
MCHC RBC AUTO-ENTMCNC: 34 G/DL (ref 31.5–36.5)
MCV RBC AUTO: 91 FL (ref 78–100)
PLATELET # BLD AUTO: 110 10E3/UL (ref 150–450)
POTASSIUM BLD-SCNC: 3.9 MMOL/L (ref 3.5–5)
RBC # BLD AUTO: 3.9 10E6/UL (ref 4.4–5.9)
SODIUM SERPL-SCNC: 134 MMOL/L (ref 136–145)
WBC # BLD AUTO: 5 10E3/UL (ref 4–11)

## 2023-04-24 PROCEDURE — 80048 BASIC METABOLIC PNL TOTAL CA: CPT | Performed by: INTERNAL MEDICINE

## 2023-04-24 PROCEDURE — 83735 ASSAY OF MAGNESIUM: CPT | Performed by: STUDENT IN AN ORGANIZED HEALTH CARE EDUCATION/TRAINING PROGRAM

## 2023-04-24 PROCEDURE — 85379 FIBRIN DEGRADATION QUANT: CPT | Performed by: INTERNAL MEDICINE

## 2023-04-24 PROCEDURE — 93306 TTE W/DOPPLER COMPLETE: CPT | Mod: 26 | Performed by: INTERNAL MEDICINE

## 2023-04-24 PROCEDURE — 250N000011 HC RX IP 250 OP 636: Performed by: INTERNAL MEDICINE

## 2023-04-24 PROCEDURE — 99232 SBSQ HOSP IP/OBS MODERATE 35: CPT | Performed by: STUDENT IN AN ORGANIZED HEALTH CARE EDUCATION/TRAINING PROGRAM

## 2023-04-24 PROCEDURE — 85027 COMPLETE CBC AUTOMATED: CPT | Performed by: INTERNAL MEDICINE

## 2023-04-24 PROCEDURE — G0378 HOSPITAL OBSERVATION PER HR: HCPCS

## 2023-04-24 PROCEDURE — 96361 HYDRATE IV INFUSION ADD-ON: CPT

## 2023-04-24 PROCEDURE — 96372 THER/PROPH/DIAG INJ SC/IM: CPT | Performed by: INTERNAL MEDICINE

## 2023-04-24 PROCEDURE — 250N000011 HC RX IP 250 OP 636: Performed by: STUDENT IN AN ORGANIZED HEALTH CARE EDUCATION/TRAINING PROGRAM

## 2023-04-24 PROCEDURE — 250N000013 HC RX MED GY IP 250 OP 250 PS 637: Performed by: INTERNAL MEDICINE

## 2023-04-24 PROCEDURE — 86140 C-REACTIVE PROTEIN: CPT | Performed by: INTERNAL MEDICINE

## 2023-04-24 PROCEDURE — 36415 COLL VENOUS BLD VENIPUNCTURE: CPT | Performed by: INTERNAL MEDICINE

## 2023-04-24 PROCEDURE — 255N000002 HC RX 255 OP 636: Performed by: STUDENT IN AN ORGANIZED HEALTH CARE EDUCATION/TRAINING PROGRAM

## 2023-04-24 RX ORDER — MAGNESIUM SULFATE HEPTAHYDRATE 40 MG/ML
2 INJECTION, SOLUTION INTRAVENOUS ONCE
Status: COMPLETED | OUTPATIENT
Start: 2023-04-24 | End: 2023-04-24

## 2023-04-24 RX ADMIN — FLUTICASONE PROPIONATE 2 SPRAY: 50 SPRAY, METERED NASAL at 20:03

## 2023-04-24 RX ADMIN — SALINE NASAL SPRAY 2 SPRAY: 1.5 SOLUTION NASAL at 08:27

## 2023-04-24 RX ADMIN — IPRATROPIUM BROMIDE 2 SPRAY: 42 SPRAY NASAL at 20:00

## 2023-04-24 RX ADMIN — ASPIRIN 81 MG: 81 TABLET, COATED ORAL at 08:23

## 2023-04-24 RX ADMIN — METOPROLOL TARTRATE 25 MG: 25 TABLET, FILM COATED ORAL at 08:22

## 2023-04-24 RX ADMIN — IPRATROPIUM BROMIDE 2 SPRAY: 42 SPRAY NASAL at 08:27

## 2023-04-24 RX ADMIN — METOPROLOL TARTRATE 25 MG: 25 TABLET, FILM COATED ORAL at 19:59

## 2023-04-24 RX ADMIN — Medication 6 MG: at 22:18

## 2023-04-24 RX ADMIN — PERFLUTREN 2.5 ML: 6.52 INJECTION, SUSPENSION INTRAVENOUS at 10:27

## 2023-04-24 RX ADMIN — ENOXAPARIN SODIUM 40 MG: 100 INJECTION SUBCUTANEOUS at 04:44

## 2023-04-24 RX ADMIN — MAGNESIUM SULFATE HEPTAHYDRATE 2 G: 40 INJECTION, SOLUTION INTRAVENOUS at 13:35

## 2023-04-24 RX ADMIN — SALINE NASAL SPRAY 2 SPRAY: 1.5 SOLUTION NASAL at 18:48

## 2023-04-24 RX ADMIN — PANTOPRAZOLE SODIUM 40 MG: 40 TABLET, DELAYED RELEASE ORAL at 08:23

## 2023-04-24 RX ADMIN — ATORVASTATIN CALCIUM 80 MG: 40 TABLET, FILM COATED ORAL at 08:23

## 2023-04-24 ASSESSMENT — ACTIVITIES OF DAILY LIVING (ADL)
ADLS_ACUITY_SCORE: 22

## 2023-04-24 NOTE — PROVIDER NOTIFICATION
WW rm 302 pt NW. Pt has prolonged QT around 0.5, wondering if wanting to confirm with EKG along with D/C meds? Thank you, Aline [phone]    Paged back and discontinued zofran.

## 2023-04-24 NOTE — PROGRESS NOTES
"PRIMARY DIAGNOSIS: \"GENERIC\" NURSING  OUTPATIENT/OBSERVATION GOALS TO BE MET BEFORE DISCHARGE:  ADLs back to baseline: Yes  1. Activity and level of assistance: Stand by    2. Pain status: Denies     3. Return to near baseline physical activity: Yes     Discharge Planner Nurse   Safe discharge environment identified: Yes   Barriers to discharge: Yes - echocardiogram test today        Entered by: Danni العلي RN 04/24/2023 9:43 AM     Please review provider order for any additional goals.   Nurse to notify provider when observation goals have been met and patient is ready for discharge.  "

## 2023-04-24 NOTE — PROVIDER NOTIFICATION
WW rm 302 pt NW- pt states he takes 6mg of melatonin at home, wondering if he could have that for tonight? Thank you, Aline [phone]    Cross cover put in new orders.

## 2023-04-24 NOTE — PROGRESS NOTES
Goal Outcome Evaluation: ongoing    Pt alert and oriented x4. Able to make needs known. Denies chest pain or any SOB when resting. Does have GUERRERO. On RA throughout the night. Tele reads NSR with prolonged QT and 1st degree AV block. Put in sticky note about medication that could prolong QT. Will continue to monitor.       Plan of Care Reviewed With: patient    Overall Patient Progress: no changeOverall Patient Progress: no change         PRIMARY DIAGNOSIS: syncope    OUTPATIENT/OBSERVATION GOALS TO BE MET BEFORE DISCHARGE:  1. ADLs back to baseline: Yes    2. Activity and level of assistance: Up with standby assistance just for safety    3. Pain status: Pain free.    4. Return to near baseline physical activity: Yes     Discharge Planner Nurse   Safe discharge environment identified: Yes  Barriers to discharge: No       Entered by: Aline Gutiérrez RN 04/24/2023 3:27 AM     Please review provider order for any additional goals.   Nurse to notify provider when observation goals have been met and patient is ready for discharge.

## 2023-04-24 NOTE — PROGRESS NOTES
"PRIMARY DIAGNOSIS: \"GENERIC\" NURSING  OUTPATIENT/OBSERVATION GOALS TO BE MET BEFORE DISCHARGE:  1. ADLs back to baseline: Yes    2. Activity and level of assistance: Stand by    3. Pain status: Denies    4. Return to near baseline physical activity: Yes     Discharge Planner Nurse   Safe discharge environment identified: Yes  Barriers to discharge: Yes - echocardiogram today       Entered by: Danni العلي RN 04/24/2023 3:07 PM     Please review provider order for any additional goals.   Nurse to notify provider when observation goals have been met and patient is ready for discharge.  "

## 2023-04-24 NOTE — PROGRESS NOTES
Goal Outcome Evaluation: ongoing      Plan of Care Reviewed With: patient    Overall Patient Progress: no changeOverall Patient Progress: no change         PRIMARY DIAGNOSIS: syncope    OUTPATIENT/OBSERVATION GOALS TO BE MET BEFORE DISCHARGE:  1. ADLs back to baseline: Yes    2. Activity and level of assistance: Up with standby assistance just for safety    3. Pain status: Pain free.    4. Return to near baseline physical activity: Yes     Discharge Planner Nurse   Safe discharge environment identified: Yes  Barriers to discharge: No       Entered by: Aline Gutiérrez RN 04/24/2023 3:27 AM     Please review provider order for any additional goals.   Nurse to notify provider when observation goals have been met and patient is ready for discharge.

## 2023-04-24 NOTE — PROGRESS NOTES
[facility]    Medicine Progress Note - Hospitalist Service    Date of Admission:  4/22/2023    Assessment & Plan   Damian Butler is an 82 years old man with past medical history significant for coronary artery disease, MI 1997 status post CABG, supraventricular tachycardia status post ablation, aortic stenosis, hypertension, prior hospitalization for possible vasovagal syncope.  Who presented to the hospital on 4/21 after syncope episode.  He was found to have COVID-19 infection.  No signs of pneumonia.  Etiology of syncope is unclear but most likely dehydration due to diarrhea and decreased p.o. intake.  Orthostatic after receiving some fluid in the ED was negative.  Mild hyponatremia on presentation improved with IV fluid.  Started on remdesivir and dexamethasone (questionable transient hypoxia on presentation), telemetry negative for any arrhythmia. 4/24 QTc prolonged--> stop remdesivir.  Patient is recovering well.  Will most likely be able to leave home on 4/25.      #Syncope  -Decreased p.o. intake.   -Patient was standing in his kitchen when he felt warm and shortly after lost consciousness.   -No chest pain or heart palpitation.  -No symptoms of seizures  -The most likely etiology is syncope due to dehydration particularly given history of moderate aortic stenosis. Cannot completely rule out cardiac arrhythmia.   -Evaluated by electrophysiology on 5/22--> discussed NSVT treatment including ILR implantation or EP study--> patient elected expectant management.    Plan:  [] Continue normal saline at 50 cc/h.  [] Follow-up with transthoracic echocardiogram (completed pending report)  [] Supportive management of COVID-19 infection.  [] Continue telemetry (if there is any signs of NSVT will consult cardiology)  [] Continue metoprolol home dose.      #COVID-19 infection  #Hyponatremia  -Runny nose, sore throat and diarrhea started around a week prior to this hospitalization.  Possible  exposure around a week ago.  -Fully vaccinated against COVID-19.  -Chest x-ray on presentation without any signs of pneumonia.  -Procalcitonin negative  -Started on dexamethasone and remdesivir on presentation (dexamethasone for possible hypoxia)  -Mild hyponatremia possibly due to dehydration.  Sodium improved from 128->134 over the last 24 hours after gentle hydration with normal saline.  -QTc prolonged.  -Saturating around 95% on room air since evaluated in the ED on 4/23.    Plan:  [] Stop remdesivir and dexamethasone.        #Coronary artery disease  -MI 1997  -He underwent 2-vessel bypass surgery with tiny graft to the LAD and ESPERANZA graft to the RCA.   -Damian underwent repeat angiography and on 5 July 2018 had successful PCI of distal right coronary artery via ESPERANZA graft with placement of 3 drug-eluting stents (3.5×28 mm, 3.5×24 mm, and 3.0×12 mm Synergy drug-eluting stents).    -Nuclear perfusion imaging study 9 February 2022 revealed a large area of non-transmural infarction involving the inferior to inferolateral wall, but no evidence of ischemia.     Plan:  [] Continue aspirin  [] Continue atorvastatin 80 mg daily  [] Continue metoprolol 25 mg twice daily      #History of supraventricular tachycardia  -History of supraventricular tachycardia. 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  Patient underwent repeat ablation 7 May 2019.  He states he has had no subjective recurrence since his last procedure.        Plan:  [] If any cardiac arrhythmia on telemetry we will consult cardiology.         Diet: Combination Diet Regular Diet Adult    DVT Prophylaxis: Low Risk/Ambulatory with no VTE prophylaxis indicated  Marin Catheter: Not present  Lines: None     Cardiac Monitoring: ACTIVE order. Indication: Syncope- low cardiac risk (24 hours)  Code Status: Full Code      Clinically Significant Risk Factors Present on Admission         #  Hyponatremia: Lowest Na = 128 mmol/L in last 2 days, will monitor as appropriate      # Hypoalbuminemia: Lowest albumin = 3 g/dL at 4/23/2023  6:56 AM, will monitor as appropriate   # Thrombocytopenia: Lowest platelets = 101 in last 2 days, will monitor for bleeding                Disposition Plan Pending further evaluation     Expected Discharge Date: 04/25/2023    Discharge Delays: Echo Result Pending (enter specific test & time in comments)  Destination: home with family  Discharge Comments: Covid +, needs ECHO          GORDON WASHINGTON MD  Hospitalist Service  [facility]  Securely message with [employer] (more info)  Text page via [email or web address] Paging/Directory   ______________________________________________________________________    Interval History   No significant events.  Patient is feeling better.  Continues to have some intermittent cough.  Diarrhea has significant improved since admission.  Denies any chest pain or shortness of breath denies any hospitalization.      Physical Exam   Vital Signs: Temp: 97.7  F (36.5  C) Temp src: Oral BP: 125/67 Pulse: 67   Resp: 18 SpO2: 95 % O2 Device: None (Room air)    Weight: 167 lbs 1.6 oz    Physical Exam  Constitutional:       General: He is not in acute distress.     Appearance: He is not toxic-appearing.   Cardiovascular:      Rate and Rhythm: Normal rate.   Pulmonary:      Effort: No respiratory distress.      Breath sounds: No wheezing.   Skin:     General: Skin is warm and dry.   Neurological:      Mental Status: He is alert.   Psychiatric:         Mood and Affect: Mood normal.         Medical Decision Making       55 MINUTES SPENT BY ME on the date of service doing chart review, history, exam, documentation & further activities per the note.      Data   Imaging results reviewed over the past 24 hrs:   No results found for this or any previous visit (from the past 24 hour(s)).  Recent Labs   Lab 04/24/23  0844 04/23/23  0656 04/23/23  0015  04/22/23 2012   WBC 5.0 5.7  --  6.1   HGB 12.0* 13.0*  --  13.1*   MCV 91 90  --  91   * 101*  --  102*   * 132* 130* 128*   POTASSIUM 3.9 3.8  --  4.3   CHLORIDE 104 101  --  97*   CO2 22 21*  --  21*   BUN 20 15  --  21   CR 0.74 0.71  --  0.85   ANIONGAP 8 10  --  10   CHICHO 7.7* 7.9*  --  8.1*    104  --  116   ALBUMIN  --  3.0*  --  3.3*   PROTTOTAL  --  6.2  --  6.7   BILITOTAL  --  0.7  --  0.9   ALKPHOS  --  101  --  100   ALT  --  18  --  20   AST  --  26  --  33

## 2023-04-24 NOTE — UTILIZATION REVIEW
Concurrent stay review; Secondary Review Determination     Under the authority of the Utilization Management Committee, the utilization review process indicated a secondary review on the above patient.  The review outcome is based on review of the medical records, discussions with staff, and applying clinical experience noted on the date of the review.       (x) Observation Status Appropriate - Concurrent stay review    RATIONALE FOR DETERMINATION     Mr. Butler is a 83 yo male with a PMH of CAD, SVT and h/o vasovagal syncope who presents to the ED with cough, diarrhea and dehydration due to acute COVID-19 infection.  He has not been persistently hypoxic and dexamethasone stopped.  Remdesivir dose given then stopped after findings of prolonged QTc.  Orthostatics are normal.  CXR normal, ECHO pending.  On regular diet. Vitals are stable. Likely will be able to go home later today or tomorrow.     Patient is clinically improving and there is no clear indication to change patient's status to inpatient. The severity of illness, intensity of service provided, expected LOS and risk for adverse outcome make the care appropriate for observation.      The information on this document is developed by the utilization review team in order for the business office to ensure compliance.  This only denotes the appropriateness of proper admission status and does not reflect the quality of care rendered.         The definitions of Inpatient Status and Observation Status used in making the determination above are those provided in the CMS Coverage Manual, Chapter 1 and Chapter 6, section 70.4.          Sincerely,       Johanna Walker, DO  Utilization Review  Physician Advisor  [facility].

## 2023-04-24 NOTE — PLAN OF CARE
"PRIMARY DIAGNOSIS: \"GENERIC\" NURSING  OUTPATIENT/OBSERVATION GOALS TO BE MET BEFORE DISCHARGE:  ADLs back to baseline: Yes     Activity and level of assistance: Stand by     Pain status: Denies     Return to near baseline physical activity: Yes             Discharge Planner Nurse []Expand by Default  Safe discharge environment identified: Yes  Barriers to discharge: No        Entered by: Danni العلي RN 04/24/2023 7:00 PM        Please review provider order for any additional goals.   Nurse to notify provider when observation goals have been met and patient is ready for discharge.          Problem: Plan of Care - These are the overarching goals to be used throughout the patient stay.    Goal: Optimal Comfort and Wellbeing  4/24/2023 1740 by Danni العلي, RN  Outcome: Progressing     Problem: Electrolyte Imbalance  Goal: Electrolyte Imbalance: Plan of Care  Outcome: Progressing    Pt up Assist of SB, tolerating activity fairly well. Pt  remains on RA. NSR w/ 1AVB, BBB, & prolonged QT/Qtc on telemetry. Remdesivir discontinued. Mg ordered and replaced per protocol. Echo performed today. BM x1. Pt reported more solid than previous days. NS @ 50 mL/hr. Call light within reach and purposeful rounding performed. Danni العلي RN       "

## 2023-04-24 NOTE — PLAN OF CARE
Goal Outcome Evaluation: ongoing      Plan of Care Reviewed With: patient    Overall Patient Progress: no changeOverall Patient Progress: no change         PRIMARY DIAGNOSIS: syncope    OUTPATIENT/OBSERVATION GOALS TO BE MET BEFORE DISCHARGE:  ADLs back to baseline: Yes    Activity and level of assistance: Up with standby assistance just for safety    Pain status: Pain free.    Return to near baseline physical activity: Yes     Discharge Planner Nurse   Safe discharge environment identified: Yes  Barriers to discharge: No       Entered by: Aline Gutiérrez RN 04/24/2023 3:27 AM     Please review provider order for any additional goals.   Nurse to notify provider when observation goals have been met and patient is ready for discharge.

## 2023-04-25 VITALS
OXYGEN SATURATION: 94 % | DIASTOLIC BLOOD PRESSURE: 72 MMHG | HEART RATE: 66 BPM | HEIGHT: 69 IN | SYSTOLIC BLOOD PRESSURE: 144 MMHG | BODY MASS INDEX: 24.75 KG/M2 | TEMPERATURE: 98.1 F | WEIGHT: 167.1 LBS | RESPIRATION RATE: 20 BRPM

## 2023-04-25 LAB
ANION GAP SERPL CALCULATED.3IONS-SCNC: 5 MMOL/L (ref 5–18)
BUN SERPL-MCNC: 21 MG/DL (ref 8–28)
C REACTIVE PROTEIN LHE: 0.7 MG/DL (ref 0–?)
CALCIUM SERPL-MCNC: 8 MG/DL (ref 8.5–10.5)
CHLORIDE BLD-SCNC: 100 MMOL/L (ref 98–107)
CO2 SERPL-SCNC: 27 MMOL/L (ref 22–31)
CREAT SERPL-MCNC: 0.7 MG/DL (ref 0.7–1.3)
D DIMER PPP FEU-MCNC: 0.59 UG/ML FEU (ref 0–0.5)
ERYTHROCYTE [DISTWIDTH] IN BLOOD BY AUTOMATED COUNT: 13.3 % (ref 10–15)
GFR SERPL CREATININE-BSD FRML MDRD: >90 ML/MIN/1.73M2
GLUCOSE BLD-MCNC: 80 MG/DL (ref 70–125)
HCT VFR BLD AUTO: 36.9 % (ref 40–53)
HGB BLD-MCNC: 12.5 G/DL (ref 13.3–17.7)
MAGNESIUM SERPL-MCNC: 1.8 MG/DL (ref 1.8–2.6)
MCH RBC QN AUTO: 30.7 PG (ref 26.5–33)
MCHC RBC AUTO-ENTMCNC: 33.9 G/DL (ref 31.5–36.5)
MCV RBC AUTO: 91 FL (ref 78–100)
PLATELET # BLD AUTO: 113 10E3/UL (ref 150–450)
POTASSIUM BLD-SCNC: 4.7 MMOL/L (ref 3.5–5)
RBC # BLD AUTO: 4.07 10E6/UL (ref 4.4–5.9)
SODIUM SERPL-SCNC: 132 MMOL/L (ref 136–145)
WBC # BLD AUTO: 6.3 10E3/UL (ref 4–11)

## 2023-04-25 PROCEDURE — 99239 HOSP IP/OBS DSCHRG MGMT >30: CPT | Performed by: FAMILY MEDICINE

## 2023-04-25 PROCEDURE — 85027 COMPLETE CBC AUTOMATED: CPT | Performed by: INTERNAL MEDICINE

## 2023-04-25 PROCEDURE — 36415 COLL VENOUS BLD VENIPUNCTURE: CPT | Performed by: INTERNAL MEDICINE

## 2023-04-25 PROCEDURE — G0378 HOSPITAL OBSERVATION PER HR: HCPCS

## 2023-04-25 PROCEDURE — 80048 BASIC METABOLIC PNL TOTAL CA: CPT | Performed by: INTERNAL MEDICINE

## 2023-04-25 PROCEDURE — 85379 FIBRIN DEGRADATION QUANT: CPT | Performed by: INTERNAL MEDICINE

## 2023-04-25 PROCEDURE — 83735 ASSAY OF MAGNESIUM: CPT | Performed by: STUDENT IN AN ORGANIZED HEALTH CARE EDUCATION/TRAINING PROGRAM

## 2023-04-25 PROCEDURE — 250N000011 HC RX IP 250 OP 636: Performed by: INTERNAL MEDICINE

## 2023-04-25 PROCEDURE — 250N000011 HC RX IP 250 OP 636: Performed by: STUDENT IN AN ORGANIZED HEALTH CARE EDUCATION/TRAINING PROGRAM

## 2023-04-25 PROCEDURE — 250N000013 HC RX MED GY IP 250 OP 250 PS 637: Performed by: INTERNAL MEDICINE

## 2023-04-25 PROCEDURE — 86140 C-REACTIVE PROTEIN: CPT | Performed by: INTERNAL MEDICINE

## 2023-04-25 PROCEDURE — 96372 THER/PROPH/DIAG INJ SC/IM: CPT | Performed by: INTERNAL MEDICINE

## 2023-04-25 PROCEDURE — 96376 TX/PRO/DX INJ SAME DRUG ADON: CPT

## 2023-04-25 PROCEDURE — 96361 HYDRATE IV INFUSION ADD-ON: CPT

## 2023-04-25 RX ORDER — MAGNESIUM SULFATE HEPTAHYDRATE 40 MG/ML
2 INJECTION, SOLUTION INTRAVENOUS ONCE
Status: COMPLETED | OUTPATIENT
Start: 2023-04-25 | End: 2023-04-25

## 2023-04-25 RX ADMIN — PANTOPRAZOLE SODIUM 40 MG: 40 TABLET, DELAYED RELEASE ORAL at 07:46

## 2023-04-25 RX ADMIN — IPRATROPIUM BROMIDE 2 SPRAY: 42 SPRAY NASAL at 07:48

## 2023-04-25 RX ADMIN — ASPIRIN 81 MG: 81 TABLET, COATED ORAL at 07:46

## 2023-04-25 RX ADMIN — MAGNESIUM SULFATE HEPTAHYDRATE 2 G: 40 INJECTION, SOLUTION INTRAVENOUS at 06:12

## 2023-04-25 RX ADMIN — SALINE NASAL SPRAY 2 SPRAY: 1.5 SOLUTION NASAL at 07:48

## 2023-04-25 RX ADMIN — METOPROLOL TARTRATE 25 MG: 25 TABLET, FILM COATED ORAL at 07:46

## 2023-04-25 RX ADMIN — ENOXAPARIN SODIUM 40 MG: 100 INJECTION SUBCUTANEOUS at 03:04

## 2023-04-25 RX ADMIN — ATORVASTATIN CALCIUM 80 MG: 40 TABLET, FILM COATED ORAL at 07:46

## 2023-04-25 ASSESSMENT — ACTIVITIES OF DAILY LIVING (ADL)
ADLS_ACUITY_SCORE: 22

## 2023-04-25 NOTE — PLAN OF CARE
Problem: Plan of Care - These are the overarching goals to be used throughout the patient stay.    Goal: Optimal Comfort and Wellbeing  Outcome: Adequate for Care Transition     Problem: Plan of Care - These are the overarching goals to be used throughout the patient stay.    Goal: Readiness for Transition of Care  Outcome: Adequate for Care Transition    Pt up independently tolerating activity well. VSS on RA. Remains NSR w/ 1AVB, BBB, w/ prolonged QT on tele. Discharge orders placed. IV removed. Pt supportive of plan of care. Educated on apixiban and to follow up with primary MD. Pt transported home by family. No further questions at this time. Danni العلي RN

## 2023-04-25 NOTE — PROGRESS NOTES
"PRIMARY DIAGNOSIS: \"GENERIC\" NURSING  OUTPATIENT/OBSERVATION GOALS TO BE MET BEFORE DISCHARGE:  ADLs back to baseline: Yes   1. Activity and level of assistance: Independent    2. Pain status: Denies pain    3. Return to near baseline physical activity: Yes     Discharge Planner Nurse   Safe discharge environment identified: Yes  Barriers to discharge: No - waiting for family to transport       Entered by: Danni العلي RN 04/25/2023 10:52 AM     Please review provider order for any additional goals.   Nurse to notify provider when observation goals have been met and patient is ready for discharge.  "

## 2023-04-25 NOTE — DISCHARGE SUMMARY
[facility] MEDICINE  DISCHARGE SUMMARY     Primary Care Physician: Talisha Peña  Admission Date: 4/22/2023   Discharge Provider: Cadence Dukes MD Discharge Date: 4/25/2023   Diet:   Regular diet   Code Status: Full Code   Activity: DCACTIVITY: Activity as tolerated        Condition at Discharge: Stable     REASON FOR PRESENTATION(See Admission Note for Details)   Short of breath, weakness and lost consciousness    PRINCIPAL & ACTIVE DISCHARGE DIAGNOSES     Syncope  COVID-19 infection  Moderate dehydration, corrected  Coronary artery disease with CABG and stents  History of supraventricular tachycardia status post ablation in 2010 and 2019  Moderate aortic regurgitation  Moderate aortic stenosis    PENDING LABS     Unresulted Labs Ordered in the Past 30 Days of this Admission     No orders found from 3/23/2023 to 4/23/2023.          PROCEDURES ( this hospitalization only)      None    RECOMMENDATIONS TO OUTPATIENT PROVIDER FOR F/U VISIT     Follow-up Appointments     Follow-up and recommended labs and tests       Follow-up with primary MD in 1 to 2-week  Follow-up with cardiology in 2-week             DISPOSITION     Home    SUMMARY OF HOSPITAL COURSE:      Mr.Norman AMANDA Butler is an 82 years old man with past medical history significant for coronary artery disease, MI 1997 status post CABG, supraventricular tachycardia status post ablation in 2010 and 2019, moderate aortic stenosis, hypertension, prior hospitalization for possible vasovagal syncope.  Who presented to the hospital on 4/21 after syncope episode.  Found to have positive COVID.  Exposure a week ago.  Fully vaccinated against COVID.  Started on dexamethasone and remdesivir on admission then discontinued.  Maintaining oxygen saturation well in room air.  Will take Eliquis for 30-day for anticoagulation due to recent COVID.    Etiology of syncope is unclear but most likely from moderate dehydration due to poor  oral intake and nonbloody diarrhea.  No further syncope in the hospital.  Received gentle hydration.  Echocardiogram revealed moderate aortic stenosis.  Will follow-up with cardiology as outpatient.  History of coronary artery disease with coronary artery bypass graft to LAD and ESPERANZA graft to RCA in 1997.  3 drug-eluting stent placement in 2018. Nuclear perfusion imaging study 9 February 2022 revealed a large area of non-transmural infarction involving the inferior to inferolateral wall, but no evidence of ischemia.  Resume aspirin, statin, beta-blocker.  Remains asymptomatic.  History of supraventricular tachycardia.  Heart rate is stable now.    Discharge Medications with Med changes:     Current Discharge Medication List      START taking these medications    Details   apixaban ANTICOAGULANT (ELIQUIS) 2.5 MG tablet Take 1 tablet (2.5 mg) by mouth 2 times daily  Qty: 60 tablet, Refills: 0    Associated Diagnoses: Infection due to 2019 novel coronavirus         CONTINUE these medications which have NOT CHANGED    Details   aspirin 81 MG EC tablet [ASPIRIN 81 MG EC TABLET] Take 1 tablet (81 mg total) by mouth daily.    Associated Diagnoses: CAD (coronary artery disease)      atorvastatin (LIPITOR) 80 MG tablet [ATORVASTATIN (LIPITOR) 80 MG TABLET] Take 80 mg by mouth daily.      betamethasone dipropionate (DIPROLENE) 0.05 % cream [BETAMETHASONE DIPROPIONATE (DIPROLENE) 0.05 % CREAM] Apply 1 application topically 2 (two) times a day as needed.      fluticasone (FLONASE) 50 mcg/actuation nasal spray [FLUTICASONE (FLONASE) 50 MCG/ACTUATION NASAL SPRAY] Apply 2 sprays into each nostril every evening.       glucosamine sulfate (GLUCOSAMINE) 750 mg Tab [GLUCOSAMINE SULFATE (GLUCOSAMINE) 750 MG TAB] Take 1,500 mg by mouth daily.       ipratropium (ATROVENT) 0.06 % nasal spray [IPRATROPIUM (ATROVENT) 0.06 % NASAL SPRAY] Apply 2 sprays into each nostril 2 (two) times a day.      melatonin 3 mg Tab [MELATONIN 3 MG TAB] Take 6  mg by mouth bedtime.       methylcellulose (CITRUCEL) Take 2 g by mouth daily as needed       metoprolol tartrate (LOPRESSOR) 25 MG tablet TAKE 1 TABLET BY MOUTH TWICE A DAY  Qty: 180 tablet, Refills: 0    Associated Diagnoses: Hypertension      nitroglycerin (NITROSTAT) 0.4 MG SL tablet [NITROGLYCERIN (NITROSTAT) 0.4 MG SL TABLET] Place 0.4 mg under the tongue every 5 (five) minutes as needed for chest pain.      omeprazole (PRILOSEC) 20 MG capsule [OMEPRAZOLE (PRILOSEC) 20 MG CAPSULE] Take 20 mg by mouth daily.      sodium chloride (OCEAN) 0.65 % nasal spray Spray 2 sprays into both nostrils 2 times daily Use 30 min before Atrovent spray.      vitamin A-vitamin C-vit E-min (OCUVITE) Tab tablet [VITAMIN A-VITAMIN C-VIT E-MIN (OCUVITE) TAB TABLET] Take 1 tablet by mouth daily.                   Rationale for medication changes:      Eliquis for 30-day anticoagulation for recent COVID        Consults    None    Immunizations given this encounter     Most Recent Immunizations   Administered Date(s) Administered     COVID-19 Vaccine 18+ (Moderna) 04/20/2022     COVID-19 Vaccine Bivalent Booster 12+ (Pfizer) 09/23/2022           Anticoagulation Information      Eliquis 2.5 mg twice a day for 30-day      SIGNIFICANT IMAGING FINDINGS     Results for orders placed or performed during the hospital encounter of 04/22/23   XR Chest Port 1 View    Impression    IMPRESSION: Previous sternotomy. Heart size magnified in AP projection with normal vascularity. Shallow ventilation accentuates bibasilar subsegmental atelectasis. No focal consolidation, pneumothorax nor pleural effusion.   Echocardiogram Complete   Result Value Ref Range    LVEF  50-55% (borderline)    The left ventricle is normal in size.  Left ventricular function is decreased. The ejection fraction is 50-55%  (borderline).  Hypokinesis of the basal to mid inferior wall.  The left atrium is mildly dilated. The right atrium is mildly dilated.  There is mild to  moderate (1-2+) aortic regurgitation. Moderate aortic  stenosis with a mean gradient of 26 mmHg, V max 3.2 m/s, DI 0.41.  The ascending aorta is Mildly dilated.  Compared to the prior study dated 2/8/22, there are changes as noted. Aortic  stenosis is more severe.    SIGNIFICANT LABORATORY FINDINGS   D-dimer 0.59  CRP 1-->0.7  Sodium 132, creatinine 0.70  Discharge Orders        MyChart [facility] COVID Pathway      Adult Cardiology Eval  Referral      Reason for your hospital stay    weakness     Contact provider    Contact your primary care provider if If increased trouble breathing, new arm/leg swelling, dizziness/passing out, falls, bleeding that doesn't stop, or uncontrolled pain.     Follow-up and recommended labs and tests     Follow-up with primary MD in 1 to 2-week  Follow-up with cardiology in 2-week     Discharge - Quarantine/Isolation Instruction    Date of symptom onset:     Date of first positive test:    If you tested positive COVID-19 and show symptoms (fever, cough, body aches or trouble breathing):        Stay home and away from others (self-isolate) until:        At least 10 days have passed since your symptoms started. AND...        You've had no fever-and no medicine that reduces fever for 1 full day (24 hours). AND...         Your other symptoms have resolved (gotten better).  If you tested positive for COVID-19 but don't show symptoms:       Stay home and away from others (self-isolate) until at least 10 days have passed since the date of your first positive COVID-19 test.     Activity    Your activity upon discharge: as tolerated     Diet    Follow this diet upon discharge: low fat diet       Examination   Physical Exam   Temp:  [97.4  F (36.3  C)-98.1  F (36.7  C)] 98.1  F (36.7  C)  Pulse:  [61-76] 66  Resp:  [18-20] 20  BP: (135-173)/(70-79) 144/72  Cuff Mean (mmHg):  [97] 97  SpO2:  [94 %-98 %] 94 %  Wt Readings from Last 1 Encounters:   04/23/23 75.8 kg (167 lb 1.6 oz)      GENERAL:  Alert, appears comfortable, in no acute distress, appears stated age   HEAD:  Normocephalic, without obvious abnormality, atraumatic   EYES:  PERRL, conjunctiva  clear,  EOM's intact   NOSE: Nares normal,  mucosa normal, no drainage   THROAT: Lips, mucosa,   gums normal, mouth moist   NECK: Supple, symmetrical, trachea midline   BACK:   Symmetric, no curvature, ROM normal   LUNGS:   Clear to auscultation bilaterally, no rales, rhonchi, or wheezing, symmetric chest rise on inhalation, respirations unlabored   CHEST WALL:  No tenderness or deformity   HEART:  Regular rate and rhythm, S1 and S2 normal, soft 2/6 systolic murmur     ABDOMEN:   Soft, non-tender, bowel sounds active , no masses, no organomegaly, no rebound or guarding   EXTREMITIES: No   edema    SKIN: No rashes   NEURO: Alert, oriented x 3, moves all four extremities freely/spontaneously   PSYCH: Cooperative, behavior is appropriate            Please see EMR for more detailed significant labs, imaging, consultant notes etc.    I, Cadence Dukes MD, personally saw the patient today and spent greater than 30 minutes discharging this patient.    Cadence Dukes MD  [facility]    CC:Talisha Peña

## 2023-04-25 NOTE — PROGRESS NOTES
PRIMARY DIAGNOSIS: SYNCOPE/TIA  OUTPATIENT/OBSERVATION GOALS TO BE MET BEFORE DISCHARGE:  1. Orthostatic performed: No    2. Diagnostic testing complete & at baseline neurologic testing: Yes    3. Cleared by consultants (if involved): N/A    4. Interpretation of cardiac rhythm per telemetry tech: SR w/ 1st degree AVB and BBB w/ PQTc.    5. Tolerating adequate PO diet and medications: Yes    6. Return to near baseline physical activity or neurologic status: Yes    Discharge Planner Nurse   Safe discharge environment identified: Yes  Barriers to discharge: No       Entered by: Claudine Centeno RN 04/24/2023 10:30 PM     Pt ambulating stdby assist. Remains on RA. PIV patent w/ NS @ 50mL/hr. Denies pain.

## 2023-04-25 NOTE — PLAN OF CARE
"PRIMARY DIAGNOSIS: \"GENERIC\" NURSING  OUTPATIENT/OBSERVATION GOALS TO BE MET BEFORE DISCHARGE:  ADLs back to baseline: Yes    Activity and level of assistance: Ambulating independently.    Pain status: Pain free.    Return to near baseline physical activity: Yes     Discharge Planner Nurse   Safe discharge environment identified: Yes  Barriers to discharge: No       Entered by: Claudine Centeno RN 04/25/2023 4:24 AM    Fluids stopped per MD. Denies pain. Remains on RA. SR w/ 1st degree AVB and BBB on tele. Ambulating independently in room. Pt states he is sleeping well in between bathroom visits. Uneventful night.  "

## 2023-04-25 NOTE — PROGRESS NOTES
PRIMARY DIAGNOSIS: SYNCOPE/TIA  OUTPATIENT/OBSERVATION GOALS TO BE MET BEFORE DISCHARGE:  1. Orthostatic performed: No    2. Diagnostic testing complete & at baseline neurologic testing: Yes    3. Cleared by consultants (if involved): N/A    4. Interpretation of cardiac rhythm per telemetry tech: SR w/ 1st degree AVB and BBB w/ pQTc    5. Tolerating adequate PO diet and medications: Yes    6. Return to near baseline physical activity or neurologic status: Yes    Discharge Planner Nurse   Safe discharge environment identified: Yes  Barriers to discharge: No       Entered by: Claudine Centeno RN 04/25/2023 12:03 AM    Pt ambulating stdby assist. Remains on RA. PIV patent w/ NS @ 50mL/hr. Denies pain.

## 2023-05-01 ENCOUNTER — TRANSFERRED RECORDS (OUTPATIENT)
Dept: HEALTH INFORMATION MANAGEMENT | Facility: CLINIC | Age: 83
End: 2023-05-01

## 2023-05-01 ENCOUNTER — LAB REQUISITION (OUTPATIENT)
Dept: LAB | Facility: CLINIC | Age: 83
End: 2023-05-01
Payer: MEDICARE

## 2023-05-01 DIAGNOSIS — R55 SYNCOPE AND COLLAPSE: ICD-10-CM

## 2023-05-01 LAB
ALBUMIN SERPL BCG-MCNC: 3.8 G/DL (ref 3.5–5.2)
ALP SERPL-CCNC: 136 U/L (ref 40–129)
ALT SERPL W P-5'-P-CCNC: 26 U/L (ref 10–50)
ANION GAP SERPL CALCULATED.3IONS-SCNC: 12 MMOL/L (ref 7–15)
AST SERPL W P-5'-P-CCNC: 25 U/L (ref 10–50)
BILIRUB SERPL-MCNC: 0.8 MG/DL
BUN SERPL-MCNC: 17.1 MG/DL (ref 8–23)
CALCIUM SERPL-MCNC: 8.9 MG/DL (ref 8.8–10.2)
CHLORIDE SERPL-SCNC: 98 MMOL/L (ref 98–107)
CREAT SERPL-MCNC: 0.92 MG/DL (ref 0.67–1.17)
DEPRECATED HCO3 PLAS-SCNC: 22 MMOL/L (ref 22–29)
GFR SERPL CREATININE-BSD FRML MDRD: 82 ML/MIN/1.73M2
GLUCOSE SERPL-MCNC: 82 MG/DL (ref 70–99)
POTASSIUM SERPL-SCNC: 4.8 MMOL/L (ref 3.4–5.3)
PROT SERPL-MCNC: 6.7 G/DL (ref 6.4–8.3)
SODIUM SERPL-SCNC: 132 MMOL/L (ref 136–145)

## 2023-05-01 PROCEDURE — 80053 COMPREHEN METABOLIC PANEL: CPT | Mod: ORL | Performed by: PHYSICIAN ASSISTANT

## 2023-05-02 ENCOUNTER — OFFICE VISIT (OUTPATIENT)
Dept: CARDIOLOGY | Facility: CLINIC | Age: 83
End: 2023-05-02
Attending: INTERNAL MEDICINE
Payer: MEDICARE

## 2023-05-02 VITALS
RESPIRATION RATE: 16 BRPM | OXYGEN SATURATION: 100 % | WEIGHT: 166.7 LBS | SYSTOLIC BLOOD PRESSURE: 118 MMHG | HEIGHT: 69 IN | BODY MASS INDEX: 24.69 KG/M2 | HEART RATE: 53 BPM | DIASTOLIC BLOOD PRESSURE: 62 MMHG

## 2023-05-02 DIAGNOSIS — I35.0 NONRHEUMATIC AORTIC VALVE STENOSIS: Primary | ICD-10-CM

## 2023-05-02 DIAGNOSIS — I10 HYPERTENSION, UNSPECIFIED TYPE: ICD-10-CM

## 2023-05-02 DIAGNOSIS — I25.10 CORONARY ARTERY DISEASE INVOLVING NATIVE CORONARY ARTERY OF NATIVE HEART WITHOUT ANGINA PECTORIS: ICD-10-CM

## 2023-05-02 DIAGNOSIS — E78.5 DYSLIPIDEMIA: ICD-10-CM

## 2023-05-02 PROCEDURE — 99214 OFFICE O/P EST MOD 30 MIN: CPT | Performed by: INTERNAL MEDICINE

## 2023-05-02 NOTE — LETTER
5/2/2023    Talisha Peña MD  [address] [place] [address]    RE: Damian Butler       Dear Colleague,     I had the pleasure of seeing Damian Butler in the [facility].         [facility]   [address], [place] [address]   [email or web address]   OFFICE: [phone]          Thank you Dr. Peña, Talisha Chappell for asking the [facility] team to participate in the care of your patient, Damian Butler.     Impression and Plan     1. Coronary artery disease. Roderick has known coronary disease, having suffered a myocardial infarction in 1997. At that time, he underwent 2-vessel bypass surgery with tiny graft to the LAD and ESPERANZA graft to the RCA.   ?   Damian underwent repeat angiography and on 5 July 2018 had successful PCI of distal right coronary artery via ESPERANZA graft with placement of 3 drug-eluting stents (3.5×28 mm, 3.5×24 mm, and 3.0×12 mm Synergy drug-eluting stents).      Nuclear perfusion imaging study 9 February 2022 revealed a large area of non-transmural infarction involving the inferior to inferolateral wall, but no evidence of ischemia.   ?   ?This is stable. Patient reports no chest pain or other concerning symptoms in this regard.   ?   2. History of supraventricular tachycardia. 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  Patient underwent repeat ablation 7 May 2019.  He states he has had no subjective recurrence since his last procedure.  He is very pleased with how he is performing in this regard.   ?   3.  Aortic stenosis/aortic insufficiency.  Most recent echocardiogram 24 April 2023 revealed moderate aortic stenosis with mean gradient of 26 mmHg and peak velocity 3.2 m/s.  Dimensionless index 0.41.  There is mild-moderate aortic insufficiency.  Plan to obtain repeat surveillance echocardiogram in approximately 1 year.     4.   Hypertension.   Blood pressure is reasonable in the office today at 118/62 mmHg.     4.  Syncope/NSVT.  Damian had been hospitalized early February 2022 after having episode of syncope.  As noted below, he had gotten up to use the washroom as he commonly does at night.  He had passed his urine while standing and went to wash his hands at the vanity and had rather abrupt loss of consciousness.  He was hospitalized at [facility] at that time.  Based on the description of the event, it does sound as though it was a vasovagal/micturition type syncopal event.  He did undergo an ambulatory monitor 9 February 2022 through 8 March 2022 that did reveal an episode of non-sustained ventricular tachycardia lasting 12 beats.     He subsequently met with one of our Electrophysiologist, Dr. Bhatti, on 19 May 2022.  At that time, given his syncopal spells seem most consistent with a vagal mediated event it was felt to simply continue current management, but consider an implantable loop recorder should he have any recurrent worrisome spells.  Parenthetically, he did have a syncopal spell in April 2023 though this was in the setting of suspected dehydration when he had COVID-19 infection.  Continue metoprolol (normally would consider increasing, but heart rate only 52 bpm in the clinic today).   ?   5. Dyslipidemia.  Lipid profile 12 January 2023 revealed LDL 50 mg/dL and HDL 41 mg/dL.  ?   Plan on follow-up in 1 year with echocardiogram just prior to follow-up visit.      35 minutes spent reviewing prior records (including documentation, laboratory studies, cardiac testing/imaging), interview with patient along with physical exam, planning, and subsequent documentation/crafting of note).           History of Present Illness    Once again I would like to thank you again for asking me to participate in the care of your patient, Damian Butler.  As you know, but to reiterate for my own records, Damian Butler is a 82 year old male with  known coronary disease, having suffered a myocardial infarction in 1997. At that time, he underwent 2-vessel bypass surgery with JUDY graft to the LAD and ESPERANZA graft to the RCA. Historically, he has had well-preserved LV systolic performance.    ?   Damian underwent repeat angiography and on 5 July 2018 had successful PCI of distal right coronary artery via ESPERANZA graft with placement of 3 drug-eluting stents (3.5×28 mm, 3.5×24 mm, and 3.0×12 mm Synergy drug-eluting stents).   ?   He also has a history of having AV moy reentry tachycardia for which he underwent ablative therapy in April 2010.  Patient underwent repeat ablation for SVT 7 May 2019.      Prior to last visit, Damian had suffered an episode of syncope specifically, he had gotten up as he commonly does to use the washroom at night.  He had passed his urine.  He was standing.  He went to the [facility] to wash his hands and had rather abrupt loss of consciousness.  He was hospitalized at [facility] at that time and work-up was relatively unremarkable.  He was dismissed from hospital with an ambulatory monitor. He did undergo an ambulatory monitor 9 February 2022 through 8 March 2022 that did reveal an episode of non-sustained ventricular tachycardia lasting 12 beats.     He subsequently met with one of our Electrophysiologist, Dr. Bhatti, on 19 May 2022.  At that time, given his syncopal spells seem most consistent with a vagal mediated event it was felt to simply continue current management, but consider an implantable loop recorder should he have any recurrent worrisome spells.  Parenthetically, he did have a syncopal spell in April 2023 at those this was in the setting of suspected dehydration when he had COVID-19 infection.  ?   In follow-up today, Damian states he has been doing well though he did have somewhat of a slower convalescence from his COVID-19 infection.  He denies chest pain.  No palpitations or lightheadedness.  Breathing is  comfortable.    Further review of systems is otherwise negative/noncontributory (medical record and 13 point review of systems reviewed as well and pertinent positives noted).         Cardiac Diagnostics      Echocardiogram 24 April 2023:  Normal left ventricular size and systolic performance with ejection fraction of 50-55%.  There is hypokinesis of the basal and mid inferior segments.  Moderate aortic stenosis with mean gradient of 26 mmHg and peak velocity of 3.2 m/s.  Dimensionless index measured at 0.41.  Mild-moderate aortic insufficiency.  Right ventricle not well visualized.  Mild biatrial enlargement.  Mild enlargement of proximal ascending aorta.    Echocardiogram 8 February 2022:   Normal left ventricular size and systolic performance with ejection fraction of 50 to 55%.   Moderate inferior hypokinesis.   Mild increase in left ventricular wall thickness.   Mild to moderate aortic stenosis.   Moderate aortic insufficiency.   Normal right ventricular size and systolic performance.   Normal atrial dimensions.     Echocardiogram 26 March 2017:   Normal left ventricular size and systolic performance with ejection fraction of 55%.   Mild aortic insufficiency.   Normal right ventricular size and systolic performance.   Mild left atrial enlargement.     Echocardiogram 20 November 2009 (stress echocardiogram):   Normal LV size and function.   No significant valvular heart disease. ?   There was no evidence of ischemia.     Nuclear perfusion imaging study 9 February 2022:   Nuclear images demonstrate a large area of non-transmural infarction involving the inferior to inferolateral wall.  No ischemia noted.   Normal left ventricular systolic performance with ejection fraction of 60%.  Inferolateral hypokinesis noted.     Nuclear perfusion imaging study 22 June 2018 (pre-coronary angiogram):   Medium-sized area of mixed ischemia and non-transmural infarction in the inferior and inferolateral segments.   Normal left  ventricular systolic performance with ejection fraction of 66%.     Nuclear stress perfusion studies 12 November 2010 :   Splanchnic artifact, though there was a small-medium sized inferior and inferolateral defect consistent with small infarction and small area of ischemia.   Normal left ventricular systolic performance with ejection fraction of 56%. ?   It is felt unchanged from March of 2009.     Coronary angiography 5 July 2018:   Successful PCI of distal right coronary artery via ESPERANZA graft with placement of 3 drug-eluting stents (3.5×28 mm, 3.5×24 mm, and 3.0×12 mm Synergy drug-eluting stents).     Coronary angiography 29 June 2018:   Left Main coronary artery: 10% stenosis.   Left anterior descending coronary artery: Proximal-mid 100% stenosis.  First a 50% stenosis.   Circumflex coronary artery: Moderate size vessel and angiographically normal.   Right coronary artery: 100% mid stenosis with distal 80% stenosis.   ESPERANZA graft to mid right coronary artery is large and angiographically normal.   LIMA graft to mid LAD is large and angiographically normal.     Ambulatory monitor 9 February 2022 through 8 March 2022:   Sinus rhythm without sustained tachyarrhythmia or significant bradyarrhythmia.    Occasional premature ventricular contractions.  Single episode of non-sustained ventricular tachycardia of 12 beats.     One-patch monitor 16 April 2018 through 16 May 2018:   The palpitations and rapid heart beating associated with supraventricular tachycardia, probably AV moy reentry rate 115 beats per minute on 04/29/2018 and 05/01/2018. ?   On other occasions palpitations were associated with ventricular premature beats or ventricular couplet or with sinus rhythm or sinus tachycardia without ectopy.     24-hour Holter monitor 19 February 2011:   Frequent PVCs suggestive of inferior left ventricular origin.   ?            Physical Examination       /62 (BP Location: Left arm, Patient Position: Sitting, Cuff  "Size: Adult Regular)   Pulse 53   Resp 16   Ht 1.74 m (5' 8.5\")   Wt 75.6 kg (166 lb 11.2 oz)   SpO2 100%   BMI 24.98 kg/m          Wt Readings from Last 3 Encounters:   05/02/23 75.6 kg (166 lb 11.2 oz)   04/23/23 75.8 kg (167 lb 1.6 oz)   12/30/22 74.8 kg (165 lb)       The patient is alert and oriented times three. Sclerae are anicteric. Mucosal membranes are moist. Jugular venous pressure is normal. No significant adenopathy/thyromegally appreciated. Lungs are clear with good expansion. On cardiovascular exam, the patient has a regular S1 and S2.  There is a 2-6 early to mid peaking systolic murmur.  Abdomen is soft and non-tender. Extremities reveal no clubbing, cyanosis, or edema.         Medications  Allergies   Current Outpatient Medications   Medication Sig Dispense Refill    apixaban ANTICOAGULANT (ELIQUIS) 2.5 MG tablet Take 1 tablet (2.5 mg) by mouth 2 times daily 60 tablet 0    aspirin 81 MG EC tablet [ASPIRIN 81 MG EC TABLET] Take 1 tablet (81 mg total) by mouth daily.      atorvastatin (LIPITOR) 80 MG tablet [ATORVASTATIN (LIPITOR) 80 MG TABLET] Take 80 mg by mouth daily.      betamethasone dipropionate (DIPROLENE) 0.05 % cream [BETAMETHASONE DIPROPIONATE (DIPROLENE) 0.05 % CREAM] Apply 1 application topically 2 (two) times a day as needed.      fluticasone (FLONASE) 50 mcg/actuation nasal spray [FLUTICASONE (FLONASE) 50 MCG/ACTUATION NASAL SPRAY] Apply 2 sprays into each nostril every evening.       glucosamine sulfate (GLUCOSAMINE) 750 mg Tab [GLUCOSAMINE SULFATE (GLUCOSAMINE) 750 MG TAB] Take 1,500 mg by mouth daily.       ipratropium (ATROVENT) 0.06 % nasal spray [IPRATROPIUM (ATROVENT) 0.06 % NASAL SPRAY] Apply 2 sprays into each nostril 2 (two) times a day.      melatonin 3 mg Tab [MELATONIN 3 MG TAB] Take 6 mg by mouth bedtime.       methylcellulose (CITRUCEL) Take 2 g by mouth daily as needed       metoprolol tartrate (LOPRESSOR) 25 MG tablet TAKE 1 TABLET BY MOUTH TWICE A DAY (Patient " taking differently: Take 25 mg by mouth 2 times daily) 180 tablet 0    nitroglycerin (NITROSTAT) 0.4 MG SL tablet [NITROGLYCERIN (NITROSTAT) 0.4 MG SL TABLET] Place 0.4 mg under the tongue every 5 (five) minutes as needed for chest pain.      omeprazole (PRILOSEC) 20 MG capsule [OMEPRAZOLE (PRILOSEC) 20 MG CAPSULE] Take 20 mg by mouth daily.      sodium chloride (OCEAN) 0.65 % nasal spray Spray 2 sprays into both nostrils 2 times daily Use 30 min before Atrovent spray.      vitamin A-vitamin C-vit E-min (OCUVITE) Tab tablet [VITAMIN A-VITAMIN C-VIT E-MIN (OCUVITE) TAB TABLET] Take 1 tablet by mouth daily.         Allergies   Allergen Reactions    Amiodarone Other (See Comments)     Fatigue, shaking    Amiodarone Analogues [Amiodarone] Muscle Pain (Myalgia)          Lab Results    Chemistry/lipid CBC Cardiac Enzymes/BNP/TSH/INR   Recent Labs   Lab Test 01/12/23  0933   CHOL 105   HDL 41   LDL 50   TRIG 69     Recent Labs   Lab Test 01/12/23  0933 06/14/22  0931 06/14/21  0912   LDL 50 65 65     Recent Labs   Lab Test 05/01/23  1206   *   POTASSIUM 4.8   CHLORIDE 98   CO2 22   GLC 82   BUN 17.1   CR 0.92   GFRESTIMATED 82   CHICHO 8.9     Recent Labs   Lab Test 05/01/23  1206 04/25/23  0419 04/24/23  0844   CR 0.92 0.70 0.74     No results for input(s): A1C in the last 01813 hours.       Recent Labs   Lab Test 04/25/23  0419   WBC 6.3   HGB 12.5*   HCT 36.9*   MCV 91   *     Recent Labs   Lab Test 04/25/23  0419 04/24/23  0844 04/23/23  0656   HGB 12.5* 12.0* 13.0*    Recent Labs   Lab Test 04/23/23  0015 04/22/23 2012 02/08/22  1213   TROPONINI 0.11 0.12 0.03     No results for input(s): BNP, NTBNPI, NTBNP in the last 06034 hours.  Recent Labs   Lab Test 06/14/22  0931   TSH 2.71     No results for input(s): INR in the last 54860 hours.     Medical History  Surgical History Family History Social History   No past medical history on file.  Past Surgical History:   Procedure Laterality Date    ABLATION OF  DYSRHYTHMIC FOCUS  2019    Typical AV moy reentry    BACK SURGERY      BYPASS GRAFT ARTERY CORONARY  1997    Comments: X 2 LIMA to LAD ESPERANZA to RCA    CARDIAC ELECTROPHYSIOLOGY MAPPING AND ABLATION      CV CORONARY ANGIOGRAM N/A 2018    Procedure: Coronary Angiogram;  Surgeon: Carito Flannery MD;  Location: [facility];  Service:     CV CORONARY ANGIOGRAM N/A 2018    Procedure: Coronary Angiogram;  Surgeon: Carito Flannery MD;  Location: [facility];  Service:     CV LEFT HEART CATHETERIZATION WITHOUT LEFT VENTRICULOGRAM Left 2018    Procedure: Left Heart Catheterization Without Left Ventriculogram;  Surgeon: Carito Flannery MD;  Location: [facility];  Service:     EP ABLATION SVT N/A 2019    Procedure: EP Ablation Supra Ventricular;  Surgeon: Steve Leyva MD;  Location: [facility];  Service: Cardiology    HAND SURGERY      HERNIA REPAIR      MOHS MICROGRAPHIC PROCEDURE      MT ABLATE HEART DYSRHYTHM FOCUS      Description: Catheter Ablation Atrial Supraventricular Tachycardia;  Proc Date: 2010;  Comments: AVNRT  cryoablation    TONSILLECTOMY      ZZHC REPAIR EPIGASTRIC HERNIA,REDUC N/A 2014    Procedure: EPIGASTRIC INCISIONAL HERNIA REPAIR ;  Surgeon: Daniel Gutierrez MD;  Location: [facility];  Service: General     Family History   Problem Relation Age of Onset    Hypertension Father         Social History     Socioeconomic History    Marital status:      Spouse name: Not on file    Number of children: Not on file    Years of education: Not on file    Highest education level: Not on file   Occupational History    Not on file   Tobacco Use    Smoking status: Former     Types: Cigarettes     Quit date: 1973     Years since quittin.3    Smokeless tobacco: Never   Vaping Use    Vaping status: Not on file   Substance and Sexual Activity    Alcohol use: Yes     Alcohol/week: 1.0 standard drink of alcohol      Comment: Alcoholic Drinks/day: daily with dinner    Drug use: No    Sexual activity: Not on file   Other Topics Concern    Not on file   Social History Narrative    Not on file     Social Determinants of Health     Financial Resource Strain: Not on file   Food Insecurity: Not on file   Transportation Needs: Not on file   Physical Activity: Not on file   Stress: Not on file   Social Connections: Not on file   Intimate Partner Violence: Not on file   Housing Stability: Not on file                      Thank you for allowing me to participate in the care of your patient.      Sincerely,     Renetta Crabtree MD     [facility]  cc:   Renetta Crabtree MD  [address]  [place] [address]

## 2023-05-02 NOTE — PROGRESS NOTES
[facility] HEART CARE   [address], [place] [address]   [email or web address]   OFFICE: [phone]          Thank you Talisha Jacobson for asking the [facility] Heart Care team to participate in the care of your patient, Damian Butler.     Impression and Plan     1. Coronary artery disease. Roderick has known coronary disease, having suffered a myocardial infarction in 1997. At that time, he underwent 2-vessel bypass surgery with tiny graft to the LAD and ESEPRANZA graft to the RCA.   ?   Damian underwent repeat angiography and on 5 July 2018 had successful PCI of distal right coronary artery via ESPERANZA graft with placement of 3 drug-eluting stents (3.5×28 mm, 3.5×24 mm, and 3.0×12 mm Synergy drug-eluting stents).      Nuclear perfusion imaging study 9 February 2022 revealed a large area of non-transmural infarction involving the inferior to inferolateral wall, but no evidence of ischemia.   ?   ?This is stable. Patient reports no chest pain or other concerning symptoms in this regard.   ?   2. History of supraventricular tachycardia. 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  Patient underwent repeat ablation 7 May 2019.  He states he has had no subjective recurrence since his last procedure.  He is very pleased with how he is performing in this regard.   ?   3.  Aortic stenosis/aortic insufficiency.  Most recent echocardiogram 24 April 2023 revealed moderate aortic stenosis with mean gradient of 26 mmHg and peak velocity 3.2 m/s.  Dimensionless index 0.41.  There is mild-moderate aortic insufficiency.    Plan to obtain repeat surveillance echocardiogram in approximately 1 year.     4.  Hypertension.   Blood pressure is reasonable in the office today at 118/62 mmHg.     4.  Syncope/NSVT.  Damian had been hospitalized early February 2022 after having episode of syncope.  As noted below, he  had gotten up to use the washroom as he commonly does at night.  He had passed his urine while standing and went to wash his hands at the [facility] and had rather abrupt loss of consciousness.  He was hospitalized at [facility] at that time.  Based on the description of the event, it does sound as though it was a vasovagal/micturition type syncopal event.  He did undergo an ambulatory monitor 9 February 2022 through 8 March 2022 that did reveal an episode of non-sustained ventricular tachycardia lasting 12 beats.     He subsequently met with one of our Electrophysiologist, Dr. Bhatti, on 19 May 2022.  At that time, given his syncopal spells seem most consistent with a vagal mediated event it was felt to simply continue current management, but consider an implantable loop recorder should he have any recurrent worrisome spells.  Parenthetically, he did have a syncopal spell in April 2023 though this was in the setting of suspected dehydration when he had COVID-19 infection.    Continue metoprolol (normally would consider increasing, but heart rate only 52 bpm in the clinic today).   ?   5. Dyslipidemia.  Lipid profile 12 January 2023 revealed LDL 50 mg/dL and HDL 41 mg/dL.  ?   Plan on follow-up in 1 year with echocardiogram just prior to follow-up visit.      35 minutes spent reviewing prior records (including documentation, laboratory studies, cardiac testing/imaging), interview with patient along with physical exam, planning, and subsequent documentation/crafting of note).           History of Present Illness    Once again I would like to thank you again for asking me to participate in the care of your patient, Damian Butler.  As you know, but to reiterate for my own records, Damian Butler is a 82 year old male with known coronary disease, having suffered a myocardial infarction in 1997. At that time, he underwent 2-vessel bypass surgery with JUDY graft to the LAD and ESPERANZA graft to the RCA. Historically, he has had  well-preserved LV systolic performance.    ?   Damian underwent repeat angiography and on 5 July 2018 had successful PCI of distal right coronary artery via ESPERANZA graft with placement of 3 drug-eluting stents (3.5×28 mm, 3.5×24 mm, and 3.0×12 mm Synergy drug-eluting stents).   ?   He also has a history of having AV moy reentry tachycardia for which he underwent ablative therapy in April 2010.  Patient underwent repeat ablation for SVT 7 May 2019.      Prior to last visit, Damian had suffered an episode of syncope specifically, he had gotten up as he commonly does to use the washroom at night.  He had passed his urine.  He was standing.  He went to the [facility] to wash his hands and had rather abrupt loss of consciousness.  He was hospitalized at [facility] at that time and work-up was relatively unremarkable.  He was dismissed from hospital with an ambulatory monitor. He did undergo an ambulatory monitor 9 February 2022 through 8 March 2022 that did reveal an episode of non-sustained ventricular tachycardia lasting 12 beats.     He subsequently met with one of our Electrophysiologist, Dr. Bhatti, on 19 May 2022.  At that time, given his syncopal spells seem most consistent with a vagal mediated event it was felt to simply continue current management, but consider an implantable loop recorder should he have any recurrent worrisome spells.  Parenthetically, he did have a syncopal spell in April 2023 at those this was in the setting of suspected dehydration when he had COVID-19 infection.  ?   In follow-up today, Damian states he has been doing well though he did have somewhat of a slower convalescence from his COVID-19 infection.  He denies chest pain.  No palpitations or lightheadedness.  Breathing is comfortable.    Further review of systems is otherwise negative/noncontributory (medical record and 13 point review of systems reviewed as well and pertinent positives noted).         Cardiac Diagnostics       Echocardiogram 24 April 2023:  1. Normal left ventricular size and systolic performance with ejection fraction of 50-55%.  2. There is hypokinesis of the basal and mid inferior segments.  3. Moderate aortic stenosis with mean gradient of 26 mmHg and peak velocity of 3.2 m/s.  Dimensionless index measured at 0.41.  4. Mild-moderate aortic insufficiency.  5. Right ventricle not well visualized.  6. Mild biatrial enlargement.  7. Mild enlargement of proximal ascending aorta.    Echocardiogram 8 February 2022:   1. Normal left ventricular size and systolic performance with ejection fraction of 50 to 55%.   2. Moderate inferior hypokinesis.   3. Mild increase in left ventricular wall thickness.   4. Mild to moderate aortic stenosis.   5. Moderate aortic insufficiency.   6. Normal right ventricular size and systolic performance.   7. Normal atrial dimensions.     Echocardiogram 26 March 2017:   1. Normal left ventricular size and systolic performance with ejection fraction of 55%.   2. Mild aortic insufficiency.   3. Normal right ventricular size and systolic performance.   4. Mild left atrial enlargement.     Echocardiogram 20 November 2009 (stress echocardiogram):   1. Normal LV size and function.   2. No significant valvular heart disease. ?   3. There was no evidence of ischemia.     Nuclear perfusion imaging study 9 February 2022:   1. Nuclear images demonstrate a large area of non-transmural infarction involving the inferior to inferolateral wall.  No ischemia noted.   2. Normal left ventricular systolic performance with ejection fraction of 60%.  Inferolateral hypokinesis noted.     Nuclear perfusion imaging study 22 June 2018 (pre-coronary angiogram):   1. Medium-sized area of mixed ischemia and non-transmural infarction in the inferior and inferolateral segments.   2. Normal left ventricular systolic performance with ejection fraction of 66%.     Nuclear stress perfusion studies 12 November 2010 :  "  1. Splanchnic artifact, though there was a small-medium sized inferior and inferolateral defect consistent with small infarction and small area of ischemia.   2. Normal left ventricular systolic performance with ejection fraction of 56%. ?   3. It is felt unchanged from March of 2009.     Coronary angiography 5 July 2018:   1. Successful PCI of distal right coronary artery via ESPERANZA graft with placement of 3 drug-eluting stents (3.5×28 mm, 3.5×24 mm, and 3.0×12 mm Synergy drug-eluting stents).     Coronary angiography 29 June 2018:   1. Left Main coronary artery: 10% stenosis.   2. Left anterior descending coronary artery: Proximal-mid 100% stenosis.  First a 50% stenosis.   3. Circumflex coronary artery: Moderate size vessel and angiographically normal.   4. Right coronary artery: 100% mid stenosis with distal 80% stenosis.   5. ESPERANZA graft to mid right coronary artery is large and angiographically normal.   6. LIMA graft to mid LAD is large and angiographically normal.     Ambulatory monitor 9 February 2022 through 8 March 2022:   1. Sinus rhythm without sustained tachyarrhythmia or significant bradyarrhythmia.    2. Occasional premature ventricular contractions.  3. Single episode of non-sustained ventricular tachycardia of 12 beats.     One-patch monitor 16 April 2018 through 16 May 2018:   1. The palpitations and rapid heart beating associated with supraventricular tachycardia, probably AV moy reentry rate 115 beats per minute on 04/29/2018 and 05/01/2018. ?   2. On other occasions palpitations were associated with ventricular premature beats or ventricular couplet or with sinus rhythm or sinus tachycardia without ectopy.     24-hour Holter monitor 19 February 2011:   1. Frequent PVCs suggestive of inferior left ventricular origin.   ?            Physical Examination       /62 (BP Location: Left arm, Patient Position: Sitting, Cuff Size: Adult Regular)   Pulse 53   Resp 16   Ht 1.74 m (5' 8.5\")   Wt " 75.6 kg (166 lb 11.2 oz)   SpO2 100%   BMI 24.98 kg/m          Wt Readings from Last 3 Encounters:   05/02/23 75.6 kg (166 lb 11.2 oz)   04/23/23 75.8 kg (167 lb 1.6 oz)   12/30/22 74.8 kg (165 lb)       The patient is alert and oriented times three. Sclerae are anicteric. Mucosal membranes are moist. Jugular venous pressure is normal. No significant adenopathy/thyromegally appreciated. Lungs are clear with good expansion. On cardiovascular exam, the patient has a regular S1 and S2.  There is a 2-6 early to mid peaking systolic murmur.  Abdomen is soft and non-tender. Extremities reveal no clubbing, cyanosis, or edema.         Medications  Allergies   Current Outpatient Medications   Medication Sig Dispense Refill     apixaban ANTICOAGULANT (ELIQUIS) 2.5 MG tablet Take 1 tablet (2.5 mg) by mouth 2 times daily 60 tablet 0     aspirin 81 MG EC tablet [ASPIRIN 81 MG EC TABLET] Take 1 tablet (81 mg total) by mouth daily.       atorvastatin (LIPITOR) 80 MG tablet [ATORVASTATIN (LIPITOR) 80 MG TABLET] Take 80 mg by mouth daily.       betamethasone dipropionate (DIPROLENE) 0.05 % cream [BETAMETHASONE DIPROPIONATE (DIPROLENE) 0.05 % CREAM] Apply 1 application topically 2 (two) times a day as needed.       fluticasone (FLONASE) 50 mcg/actuation nasal spray [FLUTICASONE (FLONASE) 50 MCG/ACTUATION NASAL SPRAY] Apply 2 sprays into each nostril every evening.        glucosamine sulfate (GLUCOSAMINE) 750 mg Tab [GLUCOSAMINE SULFATE (GLUCOSAMINE) 750 MG TAB] Take 1,500 mg by mouth daily.        ipratropium (ATROVENT) 0.06 % nasal spray [IPRATROPIUM (ATROVENT) 0.06 % NASAL SPRAY] Apply 2 sprays into each nostril 2 (two) times a day.       melatonin 3 mg Tab [MELATONIN 3 MG TAB] Take 6 mg by mouth bedtime.        methylcellulose (CITRUCEL) Take 2 g by mouth daily as needed        metoprolol tartrate (LOPRESSOR) 25 MG tablet TAKE 1 TABLET BY MOUTH TWICE A DAY (Patient taking differently: Take 25 mg by mouth 2 times daily) 180  tablet 0     nitroglycerin (NITROSTAT) 0.4 MG SL tablet [NITROGLYCERIN (NITROSTAT) 0.4 MG SL TABLET] Place 0.4 mg under the tongue every 5 (five) minutes as needed for chest pain.       omeprazole (PRILOSEC) 20 MG capsule [OMEPRAZOLE (PRILOSEC) 20 MG CAPSULE] Take 20 mg by mouth daily.       sodium chloride (OCEAN) 0.65 % nasal spray Spray 2 sprays into both nostrils 2 times daily Use 30 min before Atrovent spray.       vitamin A-vitamin C-vit E-min (OCUVITE) Tab tablet [VITAMIN A-VITAMIN C-VIT E-MIN (OCUVITE) TAB TABLET] Take 1 tablet by mouth daily.         Allergies   Allergen Reactions     Amiodarone Other (See Comments)     Fatigue, shaking     Amiodarone Analogues [Amiodarone] Muscle Pain (Myalgia)          Lab Results    Chemistry/lipid CBC Cardiac Enzymes/BNP/TSH/INR   Recent Labs   Lab Test 01/12/23  0933   CHOL 105   HDL 41   LDL 50   TRIG 69     Recent Labs   Lab Test 01/12/23  0933 06/14/22  0931 06/14/21  0912   LDL 50 65 65     Recent Labs   Lab Test 05/01/23  1206   *   POTASSIUM 4.8   CHLORIDE 98   CO2 22   GLC 82   BUN 17.1   CR 0.92   GFRESTIMATED 82   CHICHO 8.9     Recent Labs   Lab Test 05/01/23  1206 04/25/23  0419 04/24/23  0844   CR 0.92 0.70 0.74     No results for input(s): A1C in the last 63084 hours.       Recent Labs   Lab Test 04/25/23  0419   WBC 6.3   HGB 12.5*   HCT 36.9*   MCV 91   *     Recent Labs   Lab Test 04/25/23  0419 04/24/23  0844 04/23/23  0656   HGB 12.5* 12.0* 13.0*    Recent Labs   Lab Test 04/23/23  0015 04/22/23 2012 02/08/22  1213   TROPONINI 0.11 0.12 0.03     No results for input(s): BNP, NTBNPI, NTBNP in the last 73871 hours.  Recent Labs   Lab Test 06/14/22  0931   TSH 2.71     No results for input(s): INR in the last 17074 hours.     Medical History  Surgical History Family History Social History   No past medical history on file.  Past Surgical History:   Procedure Laterality Date     ABLATION OF DYSRHYTHMIC FOCUS  05/07/2019    Typical AV moy  reentry     BACK SURGERY       BYPASS GRAFT ARTERY CORONARY  1997    Comments: X 2 LIMA to LAD ESPERANZA to RCA     CARDIAC ELECTROPHYSIOLOGY MAPPING AND ABLATION       CV CORONARY ANGIOGRAM N/A 2018    Procedure: Coronary Angiogram;  Surgeon: Carito Flannery MD;  Location: [facility] Cath Lab;  Service:      CV CORONARY ANGIOGRAM N/A 2018    Procedure: Coronary Angiogram;  Surgeon: Carito Flannery MD;  Location: [facility] Cath Lab;  Service:      CV LEFT HEART CATHETERIZATION WITHOUT LEFT VENTRICULOGRAM Left 2018    Procedure: Left Heart Catheterization Without Left Ventriculogram;  Surgeon: Carito Flannery MD;  Location: [facility] Cath Lab;  Service:      EP ABLATION SVT N/A 2019    Procedure: EP Ablation Supra Ventricular;  Surgeon: Steve Leyva MD;  Location: [facility] Cath Lab;  Service: Cardiology     HAND SURGERY       HERNIA REPAIR       MOHS MICROGRAPHIC PROCEDURE       NV ABLATE HEART DYSRHYTHM FOCUS      Description: Catheter Ablation Atrial Supraventricular Tachycardia;  Proc Date: 2010;  Comments: AVNRT  cryoablation     TONSILLECTOMY       ZZHC REPAIR EPIGASTRIC HERNIA,REDUC N/A 2014    Procedure: EPIGASTRIC INCISIONAL HERNIA REPAIR ;  Surgeon: Daniel Gutierrez MD;  Location: [facility] Main OR;  Service: General     Family History   Problem Relation Age of Onset     Hypertension Father         Social History     Socioeconomic History     Marital status:      Spouse name: Not on file     Number of children: Not on file     Years of education: Not on file     Highest education level: Not on file   Occupational History     Not on file   Tobacco Use     Smoking status: Former     Types: Cigarettes     Quit date: 1973     Years since quittin.3     Smokeless tobacco: Never   Vaping Use     Vaping status: Not on file   Substance and Sexual Activity     Alcohol use: Yes     Alcohol/week: 1.0 standard drink of alcohol     Comment: Alcoholic Drinks/day:  daily with dinner     Drug use: No     Sexual activity: Not on file   Other Topics Concern     Not on file   Social History Narrative     Not on file     Social Determinants of Health     Financial Resource Strain: Not on file   Food Insecurity: Not on file   Transportation Needs: Not on file   Physical Activity: Not on file   Stress: Not on file   Social Connections: Not on file   Intimate Partner Violence: Not on file   Housing Stability: Not on file

## 2024-01-24 ENCOUNTER — TRANSFERRED RECORDS (OUTPATIENT)
Dept: HEALTH INFORMATION MANAGEMENT | Facility: CLINIC | Age: 84
End: 2024-01-24

## 2024-01-24 ENCOUNTER — LAB REQUISITION (OUTPATIENT)
Dept: LAB | Facility: CLINIC | Age: 84
End: 2024-01-24
Payer: MEDICARE

## 2024-01-24 DIAGNOSIS — E87.1 HYPO-OSMOLALITY AND HYPONATREMIA: ICD-10-CM

## 2024-01-24 DIAGNOSIS — E78.5 HYPERLIPIDEMIA, UNSPECIFIED: ICD-10-CM

## 2024-01-24 LAB
ALBUMIN SERPL BCG-MCNC: 3.9 G/DL (ref 3.5–5.2)
ALP SERPL-CCNC: 112 U/L (ref 40–150)
ALT SERPL W P-5'-P-CCNC: 18 U/L (ref 0–70)
ANION GAP SERPL CALCULATED.3IONS-SCNC: 8 MMOL/L (ref 7–15)
AST SERPL W P-5'-P-CCNC: 25 U/L (ref 0–45)
BILIRUB SERPL-MCNC: 0.7 MG/DL
BUN SERPL-MCNC: 21.8 MG/DL (ref 8–23)
CALCIUM SERPL-MCNC: 8.9 MG/DL (ref 8.8–10.2)
CHLORIDE SERPL-SCNC: 100 MMOL/L (ref 98–107)
CHOLEST SERPL-MCNC: 111 MG/DL
CREAT SERPL-MCNC: 0.9 MG/DL (ref 0.67–1.17)
DEPRECATED HCO3 PLAS-SCNC: 27 MMOL/L (ref 22–29)
EGFRCR SERPLBLD CKD-EPI 2021: 84 ML/MIN/1.73M2
FASTING STATUS PATIENT QL REPORTED: NORMAL
GLUCOSE SERPL-MCNC: 89 MG/DL (ref 70–99)
HDLC SERPL-MCNC: 43 MG/DL
LDLC SERPL CALC-MCNC: 55 MG/DL
NONHDLC SERPL-MCNC: 68 MG/DL
POTASSIUM SERPL-SCNC: 4.6 MMOL/L (ref 3.4–5.3)
PROT SERPL-MCNC: 6.7 G/DL (ref 6.4–8.3)
SODIUM SERPL-SCNC: 135 MMOL/L (ref 135–145)
TRIGL SERPL-MCNC: 63 MG/DL

## 2024-01-24 PROCEDURE — 80053 COMPREHEN METABOLIC PANEL: CPT | Mod: ORL | Performed by: STUDENT IN AN ORGANIZED HEALTH CARE EDUCATION/TRAINING PROGRAM

## 2024-01-24 PROCEDURE — 80061 LIPID PANEL: CPT | Mod: ORL | Performed by: STUDENT IN AN ORGANIZED HEALTH CARE EDUCATION/TRAINING PROGRAM

## 2024-03-04 ENCOUNTER — TRANSFERRED RECORDS (OUTPATIENT)
Dept: HEALTH INFORMATION MANAGEMENT | Facility: CLINIC | Age: 84
End: 2024-03-04

## 2024-05-02 ENCOUNTER — HOSPITAL ENCOUNTER (OUTPATIENT)
Dept: CARDIOLOGY | Facility: CLINIC | Age: 84
Discharge: HOME | End: 2024-05-02
Attending: INTERNAL MEDICINE | Admitting: INTERNAL MEDICINE
Payer: MEDICARE

## 2024-05-02 DIAGNOSIS — I25.10 CORONARY ARTERY DISEASE INVOLVING NATIVE CORONARY ARTERY OF NATIVE HEART WITHOUT ANGINA PECTORIS: ICD-10-CM

## 2024-05-02 DIAGNOSIS — I35.0 NONRHEUMATIC AORTIC VALVE STENOSIS: ICD-10-CM

## 2024-05-02 LAB — LVEF ECHO: NORMAL

## 2024-05-02 PROCEDURE — 93306 TTE W/DOPPLER COMPLETE: CPT

## 2024-05-02 PROCEDURE — 93306 TTE W/DOPPLER COMPLETE: CPT | Mod: 26 | Performed by: STUDENT IN AN ORGANIZED HEALTH CARE EDUCATION/TRAINING PROGRAM

## 2024-05-30 DIAGNOSIS — I10 HYPERTENSION: ICD-10-CM

## 2024-05-30 RX ORDER — METOPROLOL TARTRATE 25 MG/1
TABLET, FILM COATED ORAL
Qty: 180 TABLET | Refills: 3 | Status: SHIPPED | OUTPATIENT
Start: 2024-05-30

## 2024-07-01 ENCOUNTER — OFFICE VISIT (OUTPATIENT)
Dept: CARDIOLOGY | Facility: CLINIC | Age: 84
End: 2024-07-01
Payer: MEDICARE

## 2024-07-01 VITALS
OXYGEN SATURATION: 99 % | BODY MASS INDEX: 26.22 KG/M2 | SYSTOLIC BLOOD PRESSURE: 168 MMHG | WEIGHT: 175 LBS | HEART RATE: 65 BPM | DIASTOLIC BLOOD PRESSURE: 73 MMHG

## 2024-07-01 DIAGNOSIS — R07.2 PRECORDIAL PAIN: Primary | ICD-10-CM

## 2024-07-01 DIAGNOSIS — I25.10 CORONARY ARTERY DISEASE INVOLVING NATIVE CORONARY ARTERY OF NATIVE HEART WITHOUT ANGINA PECTORIS: ICD-10-CM

## 2024-07-01 DIAGNOSIS — E78.5 DYSLIPIDEMIA: ICD-10-CM

## 2024-07-01 DIAGNOSIS — I35.1 NONRHEUMATIC AORTIC VALVE INSUFFICIENCY: ICD-10-CM

## 2024-07-01 DIAGNOSIS — I10 HYPERTENSION, UNSPECIFIED TYPE: ICD-10-CM

## 2024-07-01 DIAGNOSIS — I35.0 NONRHEUMATIC AORTIC VALVE STENOSIS: ICD-10-CM

## 2024-07-01 PROCEDURE — 99214 OFFICE O/P EST MOD 30 MIN: CPT | Performed by: INTERNAL MEDICINE

## 2024-07-01 NOTE — PROGRESS NOTES
[facility] [address], [place] [address]   [email or web address]   OFFICE: [phone]            Impression and Plan     1. Coronary artery disease. Roderick has known coronary disease, having suffered a myocardial infarction in 1997. At that time, he underwent 2-vessel bypass surgery with tiny graft to the LAD and ESPERANZA graft to the RCA.   ?   Damian underwent repeat angiography and on 5 July 2018 had successful PCI of distal right coronary artery via ESPERANZA graft with placement of 3 drug-eluting stents (3.5×28 mm, 3.5×24 mm, and 3.0×12 mm Synergy drug-eluting stents).      Nuclear perfusion imaging study 9 February 2022 revealed a large area of non-transmural infarction involving the inferior to inferolateral wall, but no evidence of ischemia.   ?   As noted below, Damian states that he has had some intermittent neck discomfort.  He reports some pain with certain movements of the neck.  Although he thinks it likely is musculoskeletal, it does cause him to have some angst & anxiety in that he has angina in the past he had manifested as some shortness of breath with associated neck pain.  He has some significant concern that perhaps some of his symptoms are ischemically mediated.  I do feel further workup is reasonable and warranted.  He would be unable to exercise sufficiently on treadmill due to some orthopedic issues.  Therefore, will require none treadmill perfusion imaging.  Plan:  Regadenoson MRI.  Of note, Damian states he has had MRI imaging in the past and feels he would have no issues with claustrophobia.  ?   2. History of supraventricular tachycardia. 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  Patient underwent repeat ablation 7 May 2019.  He states he has had no subjective recurrence since his last procedure.  He is very pleased with how he is performing in this regard.    ?   3.  Aortic stenosis/aortic insufficiency.  Echocardiogram 2 May 2024 revealed moderate aortic stenosis and moderate aortic insufficiency.     4.  Hypertension.   Blood pressure is somewhat elevated in the office today though patient readily admits that he has a fair amount of anxiety at time of visit today vis-à-vis problem #1.  He has been checking his blood pressure at home and typically has had quite favorable readings with systolic blood pressures commonly in the 110s-120s.  We jointly decided to continue to follow and he will call if he is having a tendency toward continued higher readings.     4.  Syncope/NSVT.  Damian has met with one of our electrophysiologist, Dr. Bhatti, in the past and continued medical therapy with beta-blocker therapy has been advocated.  Continue metoprolol.  ?   5. Dyslipidemia.  Lipid profile 24 January 2024 revealed LDL 55 mg/dL and HDL 43 mg/dL.  ?   Follow-up and further recommendations pending test results.      35 minutes spent reviewing prior records (including documentation, laboratory studies, cardiac testing/imaging), interview with patient along with physical exam, planning, and subsequent documentation/crafting of note).           History of Present Illness    Once again I would like to thank you again for asking me to participate in the care of your patient, Damian Butler.  As you know, but to reiterate for my own records, Damian Butler is a 83 year old male with known coronary disease, having suffered a myocardial infarction in 1997. At that time, he underwent 2-vessel bypass surgery with JUDY graft to the LAD and ESPERANZA graft to the RCA. Historically, he has had well-preserved LV systolic performance.    ?   Damian underwent repeat angiography and on 5 July 2018 had successful PCI of distal right coronary artery via ESPERANZA graft with placement of 3 drug-eluting stents (3.5×28 mm, 3.5×24 mm, and 3.0×12 mm Synergy drug-eluting stents).   ?   He also has a history of  having AV moy reentry tachycardia for which he underwent ablative therapy in April 2010.  Patient underwent repeat ablation for SVT 7 May 2019.      On interview today, Damian states that he has had some intermittent neck discomfort.  He reports some pain with certain movements of the neck.  Although he thinks it likely is musculoskeletal, it does cause him to have some angst & anxiety in that he has angina in the past he had manifested as some shortness of breath with associated neck pain.    Further review of systems is otherwise negative/noncontributory (medical record and 13 point review of systems reviewed as well and pertinent positives noted).         Cardiac Diagnostics      Echocardiogram 2 May 2024:  Normal left ventricular size and systolic performance with ejection fraction of 50-55%.  Mild increase in left ventricular wall thickness.  Moderate aortic stenosis with mean gradient of 28 mmHg and peak velocity 3.4 m/s.  Moderate aortic insufficiency.  Normal right ventricular size and systolic performance.  Mild-moderate left atrial enlargement.  Right atrium of normal dimension.    Echocardiogram 24 April 2023:  Normal left ventricular size and systolic performance with ejection fraction of 50-55%.  There is hypokinesis of the basal and mid inferior segments.  Moderate aortic stenosis with mean gradient of 26 mmHg and peak velocity of 3.2 m/s.  Dimensionless index measured at 0.41.  Mild-moderate aortic insufficiency.  Right ventricle not well visualized.  Mild biatrial enlargement.  Mild enlargement of proximal ascending aorta.    Echocardiogram 8 February 2022:   Normal left ventricular size and systolic performance with ejection fraction of 50 to 55%.   Moderate inferior hypokinesis.   Mild increase in left ventricular wall thickness.   Mild to moderate aortic stenosis.   Moderate aortic insufficiency.   Normal right ventricular size and systolic performance.   Normal atrial dimensions.      Echocardiogram 26 March 2017:   Normal left ventricular size and systolic performance with ejection fraction of 55%.   Mild aortic insufficiency.   Normal right ventricular size and systolic performance.   Mild left atrial enlargement.     Echocardiogram 20 November 2009 (stress echocardiogram):   Normal LV size and function.   No significant valvular heart disease. ?   There was no evidence of ischemia.     Nuclear perfusion imaging study 9 February 2022:   Nuclear images demonstrate a large area of non-transmural infarction involving the inferior to inferolateral wall.  No ischemia noted.   Normal left ventricular systolic performance with ejection fraction of 60%.  Inferolateral hypokinesis noted.     Nuclear perfusion imaging study 22 June 2018 (pre-coronary angiogram):   Medium-sized area of mixed ischemia and non-transmural infarction in the inferior and inferolateral segments.   Normal left ventricular systolic performance with ejection fraction of 66%.     Nuclear stress perfusion studies 12 November 2010 :   Splanchnic artifact, though there was a small-medium sized inferior and inferolateral defect consistent with small infarction and small area of ischemia.   Normal left ventricular systolic performance with ejection fraction of 56%. ?   It is felt unchanged from March of 2009.     Coronary angiography 5 July 2018:   Successful PCI of distal right coronary artery via ESPERANZA graft with placement of 3 drug-eluting stents (3.5×28 mm, 3.5×24 mm, and 3.0×12 mm Synergy drug-eluting stents).     Coronary angiography 29 June 2018:   Left Main coronary artery: 10% stenosis.   Left anterior descending coronary artery: Proximal-mid 100% stenosis.  First a 50% stenosis.   Circumflex coronary artery: Moderate size vessel and angiographically normal.   Right coronary artery: 100% mid stenosis with distal 80% stenosis.   ESPERANZA graft to mid right coronary artery is large and angiographically normal.   LIMA graft to mid  LAD is large and angiographically normal.     Ambulatory monitor 9 February 2022 through 8 March 2022:   Sinus rhythm without sustained tachyarrhythmia or significant bradyarrhythmia.    Occasional premature ventricular contractions.  Single episode of non-sustained ventricular tachycardia of 12 beats.     One-patch monitor 16 April 2018 through 16 May 2018:   The palpitations and rapid heart beating associated with supraventricular tachycardia, probably AV moy reentry rate 115 beats per minute on 04/29/2018 and 05/01/2018. ?   On other occasions palpitations were associated with ventricular premature beats or ventricular couplet or with sinus rhythm or sinus tachycardia without ectopy.     24-hour Holter monitor 19 February 2011:   Frequent PVCs suggestive of inferior left ventricular origin.   ?          Physical Examination       BP (!) 178/73 (BP Location: Left arm, Patient Position: Sitting, Cuff Size: Adult Regular)   Pulse 65   Wt 79.4 kg (175 lb)   SpO2 99%   BMI 26.22 kg/m          Wt Readings from Last 3 Encounters:   07/01/24 79.4 kg (175 lb)   05/02/23 75.6 kg (166 lb 11.2 oz)   04/23/23 75.8 kg (167 lb 1.6 oz)       The patient is alert and oriented times three. Sclerae are anicteric. Mucosal membranes are moist. Jugular venous pressure is normal. No significant adenopathy/thyromegally appreciated. Lungs are clear with good expansion. On cardiovascular exam, the patient has a regular S1 and S2.  There is a 2/6 mid peaking systolic murmur primarily at right sternal border.  Abdomen is soft and non-tender. Extremities reveal no clubbing, cyanosis, or edema.       Family History/Social History/Risk Factors   Patient does not smoke.  Family history reviewed, and family history includes Hypertension in his father.          Medical History  Surgical History Family History Social History   No past medical history on file.  Past Surgical History:   Procedure Laterality Date    ABLATION OF DYSRHYTHMIC  FOCUS  2019    Typical AV moy reentry    BACK SURGERY      BYPASS GRAFT ARTERY CORONARY  1997    Comments: X 2 LIMA to LAD ESPERANZA to RCA    CARDIAC ELECTROPHYSIOLOGY MAPPING AND ABLATION      CV CORONARY ANGIOGRAM N/A 2018    Procedure: Coronary Angiogram;  Surgeon: Carito Flannery MD;  Location: [facility];  Service:     CV CORONARY ANGIOGRAM N/A 2018    Procedure: Coronary Angiogram;  Surgeon: Carito Flannery MD;  Location: [facility];  Service:     CV LEFT HEART CATHETERIZATION WITHOUT LEFT VENTRICULOGRAM Left 2018    Procedure: Left Heart Catheterization Without Left Ventriculogram;  Surgeon: Carito Flannery MD;  Location: [facility];  Service:     EP ABLATION SVT N/A 2019    Procedure: EP Ablation Supra Ventricular;  Surgeon: Steve Leyva MD;  Location: [facility];  Service: Cardiology    HAND SURGERY      HERNIA REPAIR      MOHS MICROGRAPHIC PROCEDURE      CA ABLATE HEART DYSRHYTHM FOCUS      Description: Catheter Ablation Atrial Supraventricular Tachycardia;  Proc Date: 2010;  Comments: AVNRT  cryoablation    TONSILLECTOMY      ZZHC REPAIR EPIGASTRIC HERNIA,REDUC N/A 2014    Procedure: EPIGASTRIC INCISIONAL HERNIA REPAIR ;  Surgeon: Daniel Gutierrez MD;  Location: [facility];  Service: General     Family History   Problem Relation Age of Onset    Hypertension Father         Social History     Socioeconomic History    Marital status:      Spouse name: Not on file    Number of children: Not on file    Years of education: Not on file    Highest education level: Not on file   Occupational History    Not on file   Tobacco Use    Smoking status: Former     Current packs/day: 0.00     Types: Cigarettes     Quit date: 1973     Years since quittin.5    Smokeless tobacco: Never   Substance and Sexual Activity    Alcohol use: Yes     Alcohol/week: 1.0 standard drink of alcohol     Comment: Alcoholic Drinks/day:  daily with dinner    Drug use: No    Sexual activity: Not on file   Other Topics Concern    Not on file   Social History Narrative    Not on file     Social Determinants of Health     Financial Resource Strain: Not on file   Food Insecurity: Not on file   Transportation Needs: Not on file   Physical Activity: Not on file   Stress: Not on file   Social Connections: Not on file   Interpersonal Safety: Not on file   Housing Stability: Not on file           Medications  Allergies   Current Outpatient Medications   Medication Sig Dispense Refill    aspirin 81 MG EC tablet [ASPIRIN 81 MG EC TABLET] Take 1 tablet (81 mg total) by mouth daily.      atorvastatin (LIPITOR) 80 MG tablet [ATORVASTATIN (LIPITOR) 80 MG TABLET] Take 80 mg by mouth daily.      betamethasone dipropionate (DIPROLENE) 0.05 % cream [BETAMETHASONE DIPROPIONATE (DIPROLENE) 0.05 % CREAM] Apply 1 application topically 2 (two) times a day as needed.      fluticasone (FLONASE) 50 mcg/actuation nasal spray [FLUTICASONE (FLONASE) 50 MCG/ACTUATION NASAL SPRAY] Apply 2 sprays into each nostril every evening.       glucosamine sulfate (GLUCOSAMINE) 750 mg Tab [GLUCOSAMINE SULFATE (GLUCOSAMINE) 750 MG TAB] Take 1,500 mg by mouth daily.       ipratropium (ATROVENT) 0.06 % nasal spray [IPRATROPIUM (ATROVENT) 0.06 % NASAL SPRAY] Apply 2 sprays into each nostril 2 (two) times a day.      melatonin 3 mg Tab [MELATONIN 3 MG TAB] Take 6 mg by mouth bedtime.       methylcellulose (CITRUCEL) Take 2 g by mouth daily as needed       metoprolol tartrate (LOPRESSOR) 25 MG tablet TAKE 1 TABLET BY MOUTH TWICE A  tablet 3    nitroglycerin (NITROSTAT) 0.4 MG SL tablet [NITROGLYCERIN (NITROSTAT) 0.4 MG SL TABLET] Place 0.4 mg under the tongue every 5 (five) minutes as needed for chest pain.      omeprazole (PRILOSEC) 20 MG capsule [OMEPRAZOLE (PRILOSEC) 20 MG CAPSULE] Take 20 mg by mouth daily.      sodium chloride (OCEAN) 0.65 % nasal spray Spray 2 sprays into both nostrils  "2 times daily Use 30 min before Atrovent spray.      vitamin A-vitamin C-vit E-min (OCUVITE) Tab tablet [VITAMIN A-VITAMIN C-VIT E-MIN (OCUVITE) TAB TABLET] Take 1 tablet by mouth daily.      apixaban ANTICOAGULANT (ELIQUIS) 2.5 MG tablet Take 1 tablet (2.5 mg) by mouth 2 times daily (Patient not taking: Reported on 7/1/2024) 60 tablet 0       Allergies   Allergen Reactions    Amiodarone Other (See Comments)     Fatigue, shaking    Amiodarone Analogues [Amiodarone] Muscle Pain (Myalgia)          Lab Results    Chemistry/lipid CBC Cardiac Enzymes/BNP/TSH/INR   Recent Labs   Lab Test 01/24/24  0855   CHOL 111   HDL 43   LDL 55   TRIG 63     Recent Labs   Lab Test 01/24/24  0855 01/12/23  0933 06/14/22  0931   LDL 55 50 65     Recent Labs   Lab Test 01/24/24  0855      POTASSIUM 4.6   CHLORIDE 100   CO2 27   GLC 89   BUN 21.8   CR 0.90   GFRESTIMATED 84   CHICHO 8.9     Recent Labs   Lab Test 01/24/24  0855 05/01/23  1206 04/25/23  0419   CR 0.90 0.92 0.70     No results for input(s): \"A1C\" in the last 74425 hours.       Recent Labs   Lab Test 04/25/23  0419   WBC 6.3   HGB 12.5*   HCT 36.9*   MCV 91   *     Recent Labs   Lab Test 04/25/23  0419 04/24/23  0844 04/23/23  0656   HGB 12.5* 12.0* 13.0*    Recent Labs   Lab Test 04/23/23  0015 04/22/23 2012 02/08/22  1213   TROPONINI 0.11 0.12 0.03     No results for input(s): \"BNP\", \"NTBNPI\", \"NTBNP\" in the last 48579 hours.  Recent Labs   Lab Test 06/14/22  0931   TSH 2.71     No results for input(s): \"INR\" in the last 89642 hours.       Medications  Allergies   Current Outpatient Medications   Medication Sig Dispense Refill    aspirin 81 MG EC tablet [ASPIRIN 81 MG EC TABLET] Take 1 tablet (81 mg total) by mouth daily.      atorvastatin (LIPITOR) 80 MG tablet [ATORVASTATIN (LIPITOR) 80 MG TABLET] Take 80 mg by mouth daily.      betamethasone dipropionate (DIPROLENE) 0.05 % cream [BETAMETHASONE DIPROPIONATE (DIPROLENE) 0.05 % CREAM] Apply 1 application " topically 2 (two) times a day as needed.      fluticasone (FLONASE) 50 mcg/actuation nasal spray [FLUTICASONE (FLONASE) 50 MCG/ACTUATION NASAL SPRAY] Apply 2 sprays into each nostril every evening.       glucosamine sulfate (GLUCOSAMINE) 750 mg Tab [GLUCOSAMINE SULFATE (GLUCOSAMINE) 750 MG TAB] Take 1,500 mg by mouth daily.       ipratropium (ATROVENT) 0.06 % nasal spray [IPRATROPIUM (ATROVENT) 0.06 % NASAL SPRAY] Apply 2 sprays into each nostril 2 (two) times a day.      melatonin 3 mg Tab [MELATONIN 3 MG TAB] Take 6 mg by mouth bedtime.       methylcellulose (CITRUCEL) Take 2 g by mouth daily as needed       metoprolol tartrate (LOPRESSOR) 25 MG tablet TAKE 1 TABLET BY MOUTH TWICE A  tablet 3    nitroglycerin (NITROSTAT) 0.4 MG SL tablet [NITROGLYCERIN (NITROSTAT) 0.4 MG SL TABLET] Place 0.4 mg under the tongue every 5 (five) minutes as needed for chest pain.      omeprazole (PRILOSEC) 20 MG capsule [OMEPRAZOLE (PRILOSEC) 20 MG CAPSULE] Take 20 mg by mouth daily.      sodium chloride (OCEAN) 0.65 % nasal spray Spray 2 sprays into both nostrils 2 times daily Use 30 min before Atrovent spray.      vitamin A-vitamin C-vit E-min (OCUVITE) Tab tablet [VITAMIN A-VITAMIN C-VIT E-MIN (OCUVITE) TAB TABLET] Take 1 tablet by mouth daily.      apixaban ANTICOAGULANT (ELIQUIS) 2.5 MG tablet Take 1 tablet (2.5 mg) by mouth 2 times daily (Patient not taking: Reported on 7/1/2024) 60 tablet 0      Allergies   Allergen Reactions    Amiodarone Other (See Comments)     Fatigue, shaking    Amiodarone Analogues [Amiodarone] Muscle Pain (Myalgia)          Lab Results   Lab Results   Component Value Date     01/24/2024    CO2 27 01/24/2024    CO2 27 04/25/2023    BUN 21.8 01/24/2024    BUN 21 04/25/2023     Lab Results   Component Value Date    WBC 6.3 04/25/2023    HGB 12.5 04/25/2023    HCT 36.9 04/25/2023    MCV 91 04/25/2023     04/25/2023     Lab Results   Component Value Date    CHOL 111 01/24/2024    TRIG 63  "01/24/2024    HDL 43 01/24/2024     No results found for: \"INR\"  No results found for: \"BNP\"  Lab Results   Component Value Date    TROPONINI 0.11 04/23/2023    TROPONINI 0.12 04/22/2023    TROPONINI 0.03 02/08/2022     Lab Results   Component Value Date    TSH 2.71 06/14/2022                  "

## 2024-07-01 NOTE — LETTER
7/1/2024    Talisha Peña MD  [address] [place] [address]    RE: Damian Butler       Dear Colleague,     I had the pleasure of seeing Damian Butler in the [facility].         [facility]   [address], [place] [address]   [email or web address]   OFFICE: [phone]            Impression and Plan     1. Coronary artery disease. Roderick has known coronary disease, having suffered a myocardial infarction in 1997. At that time, he underwent 2-vessel bypass surgery with tiny graft to the LAD and ESPERANZA graft to the RCA.   ?   Damian underwent repeat angiography and on 5 July 2018 had successful PCI of distal right coronary artery via ESPERANZA graft with placement of 3 drug-eluting stents (3.5×28 mm, 3.5×24 mm, and 3.0×12 mm Synergy drug-eluting stents).      Nuclear perfusion imaging study 9 February 2022 revealed a large area of non-transmural infarction involving the inferior to inferolateral wall, but no evidence of ischemia.   ?   As noted below, Damian states that he has had some intermittent neck discomfort.  He reports some pain with certain movements of the neck.  Although he thinks it likely is musculoskeletal, it does cause him to have some angst & anxiety in that he has angina in the past he had manifested as some shortness of breath with associated neck pain.  He has some significant concern that perhaps some of his symptoms are ischemically mediated.  I do feel further workup is reasonable and warranted.  He would be unable to exercise sufficiently on treadmill due to some orthopedic issues.  Therefore, will require none treadmill perfusion imaging.  Plan:  Regadenoson MRI.  Of note, Damian states he has had MRI imaging in the past and feels he would have no issues with claustrophobia.  ?   2. History of supraventricular tachycardia. 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  Patient underwent repeat ablation 7 May 2019.  He states he has had no subjective recurrence since his last procedure.  He is very pleased with how he is performing in this regard.   ?   3.  Aortic stenosis/aortic insufficiency.  Echocardiogram 2 May 2024 revealed moderate aortic stenosis and moderate aortic insufficiency.     4.  Hypertension.   Blood pressure is somewhat elevated in the office today though patient readily admits that he has a fair amount of anxiety at time of visit today vis-à-vis problem #1.  He has been checking his blood pressure at home and typically has had quite favorable readings with systolic blood pressures commonly in the 110s-120s.  We jointly decided to continue to follow and he will call if he is having a tendency toward continued higher readings.     4.  Syncope/NSVT.  Damian has met with one of our electrophysiologist, Dr. Bhatti, in the past and continued medical therapy with beta-blocker therapy has been advocated.  Continue metoprolol.  ?   5. Dyslipidemia.  Lipid profile 24 January 2024 revealed LDL 55 mg/dL and HDL 43 mg/dL.  ?   Follow-up and further recommendations pending test results.      35 minutes spent reviewing prior records (including documentation, laboratory studies, cardiac testing/imaging), interview with patient along with physical exam, planning, and subsequent documentation/crafting of note).           History of Present Illness    Once again I would like to thank you again for asking me to participate in the care of your patient, Damian Butler.  As you know, but to reiterate for my own records, Damian Butler is a 83 year old male with known coronary disease, having suffered a myocardial infarction in 1997. At that time, he underwent 2-vessel bypass surgery with JUDY graft to the LAD and ESPERANZA graft to the RCA. Historically, he has had well-preserved LV systolic performance.    ?   Damian underwent repeat angiography and on 5 July  2018 had successful PCI of distal right coronary artery via ESPERANZA graft with placement of 3 drug-eluting stents (3.5×28 mm, 3.5×24 mm, and 3.0×12 mm Synergy drug-eluting stents).   ?   He also has a history of having AV moy reentry tachycardia for which he underwent ablative therapy in April 2010.  Patient underwent repeat ablation for SVT 7 May 2019.      On interview today, Damian states that he has had some intermittent neck discomfort.  He reports some pain with certain movements of the neck.  Although he thinks it likely is musculoskeletal, it does cause him to have some angst & anxiety in that he has angina in the past he had manifested as some shortness of breath with associated neck pain.    Further review of systems is otherwise negative/noncontributory (medical record and 13 point review of systems reviewed as well and pertinent positives noted).         Cardiac Diagnostics      Echocardiogram 2 May 2024:  Normal left ventricular size and systolic performance with ejection fraction of 50-55%.  Mild increase in left ventricular wall thickness.  Moderate aortic stenosis with mean gradient of 28 mmHg and peak velocity 3.4 m/s.  Moderate aortic insufficiency.  Normal right ventricular size and systolic performance.  Mild-moderate left atrial enlargement.  Right atrium of normal dimension.    Echocardiogram 24 April 2023:  Normal left ventricular size and systolic performance with ejection fraction of 50-55%.  There is hypokinesis of the basal and mid inferior segments.  Moderate aortic stenosis with mean gradient of 26 mmHg and peak velocity of 3.2 m/s.  Dimensionless index measured at 0.41.  Mild-moderate aortic insufficiency.  Right ventricle not well visualized.  Mild biatrial enlargement.  Mild enlargement of proximal ascending aorta.    Echocardiogram 8 February 2022:   Normal left ventricular size and systolic performance with ejection fraction of 50 to 55%.   Moderate inferior hypokinesis.   Mild  increase in left ventricular wall thickness.   Mild to moderate aortic stenosis.   Moderate aortic insufficiency.   Normal right ventricular size and systolic performance.   Normal atrial dimensions.     Echocardiogram 26 March 2017:   Normal left ventricular size and systolic performance with ejection fraction of 55%.   Mild aortic insufficiency.   Normal right ventricular size and systolic performance.   Mild left atrial enlargement.     Echocardiogram 20 November 2009 (stress echocardiogram):   Normal LV size and function.   No significant valvular heart disease. ?   There was no evidence of ischemia.     Nuclear perfusion imaging study 9 February 2022:   Nuclear images demonstrate a large area of non-transmural infarction involving the inferior to inferolateral wall.  No ischemia noted.   Normal left ventricular systolic performance with ejection fraction of 60%.  Inferolateral hypokinesis noted.     Nuclear perfusion imaging study 22 June 2018 (pre-coronary angiogram):   Medium-sized area of mixed ischemia and non-transmural infarction in the inferior and inferolateral segments.   Normal left ventricular systolic performance with ejection fraction of 66%.     Nuclear stress perfusion studies 12 November 2010 :   Splanchnic artifact, though there was a small-medium sized inferior and inferolateral defect consistent with small infarction and small area of ischemia.   Normal left ventricular systolic performance with ejection fraction of 56%. ?   It is felt unchanged from March of 2009.     Coronary angiography 5 July 2018:   Successful PCI of distal right coronary artery via ESPERANZA graft with placement of 3 drug-eluting stents (3.5×28 mm, 3.5×24 mm, and 3.0×12 mm Synergy drug-eluting stents).     Coronary angiography 29 June 2018:   Left Main coronary artery: 10% stenosis.   Left anterior descending coronary artery: Proximal-mid 100% stenosis.  First a 50% stenosis.   Circumflex coronary artery: Moderate size  vessel and angiographically normal.   Right coronary artery: 100% mid stenosis with distal 80% stenosis.   ESPERANZA graft to mid right coronary artery is large and angiographically normal.   LIMA graft to mid LAD is large and angiographically normal.     Ambulatory monitor 9 February 2022 through 8 March 2022:   Sinus rhythm without sustained tachyarrhythmia or significant bradyarrhythmia.    Occasional premature ventricular contractions.  Single episode of non-sustained ventricular tachycardia of 12 beats.     One-patch monitor 16 April 2018 through 16 May 2018:   The palpitations and rapid heart beating associated with supraventricular tachycardia, probably AV moy reentry rate 115 beats per minute on 04/29/2018 and 05/01/2018. ?   On other occasions palpitations were associated with ventricular premature beats or ventricular couplet or with sinus rhythm or sinus tachycardia without ectopy.     24-hour Holter monitor 19 February 2011:   Frequent PVCs suggestive of inferior left ventricular origin.   ?          Physical Examination       BP (!) 178/73 (BP Location: Left arm, Patient Position: Sitting, Cuff Size: Adult Regular)   Pulse 65   Wt 79.4 kg (175 lb)   SpO2 99%   BMI 26.22 kg/m          Wt Readings from Last 3 Encounters:   07/01/24 79.4 kg (175 lb)   05/02/23 75.6 kg (166 lb 11.2 oz)   04/23/23 75.8 kg (167 lb 1.6 oz)       The patient is alert and oriented times three. Sclerae are anicteric. Mucosal membranes are moist. Jugular venous pressure is normal. No significant adenopathy/thyromegally appreciated. Lungs are clear with good expansion. On cardiovascular exam, the patient has a regular S1 and S2.  There is a 2/6 mid peaking systolic murmur primarily at right sternal border.  Abdomen is soft and non-tender. Extremities reveal no clubbing, cyanosis, or edema.       Family History/Social History/Risk Factors   Patient does not smoke.  Family history reviewed, and family history includes Hypertension  in his father.          Medical History  Surgical History Family History Social History   No past medical history on file.  Past Surgical History:   Procedure Laterality Date    ABLATION OF DYSRHYTHMIC FOCUS  05/07/2019    Typical AV moy reentry    BACK SURGERY      BYPASS GRAFT ARTERY CORONARY  1997    Comments: X 2 LIMA to LAD ESPERANZA to RCA    CARDIAC ELECTROPHYSIOLOGY MAPPING AND ABLATION  2010    CV CORONARY ANGIOGRAM N/A 6/29/2018    Procedure: Coronary Angiogram;  Surgeon: Carito Flannery MD;  Location: [facility];  Service:     CV CORONARY ANGIOGRAM N/A 7/5/2018    Procedure: Coronary Angiogram;  Surgeon: Carito Flannery MD;  Location: [facility];  Service:     CV LEFT HEART CATHETERIZATION WITHOUT LEFT VENTRICULOGRAM Left 6/29/2018    Procedure: Left Heart Catheterization Without Left Ventriculogram;  Surgeon: Carito Flannery MD;  Location: [facility];  Service:     EP ABLATION SVT N/A 5/7/2019    Procedure: EP Ablation Supra Ventricular;  Surgeon: Steve Leyva MD;  Location: [facility];  Service: Cardiology    HAND SURGERY      HERNIA REPAIR      MOHS MICROGRAPHIC PROCEDURE      IL ABLATE HEART DYSRHYTHM FOCUS      Description: Catheter Ablation Atrial Supraventricular Tachycardia;  Proc Date: 04/20/2010;  Comments: AVNRT  cryoablation    TONSILLECTOMY      ZZHC REPAIR EPIGASTRIC HERNIA,REDUC N/A 7/2/2014    Procedure: EPIGASTRIC INCISIONAL HERNIA REPAIR ;  Surgeon: Daniel Gutierrez MD;  Location: [facility];  Service: General     Family History   Problem Relation Age of Onset    Hypertension Father         Social History     Socioeconomic History    Marital status:      Spouse name: Not on file    Number of children: Not on file    Years of education: Not on file    Highest education level: Not on file   Occupational History    Not on file   Tobacco Use    Smoking status: Former     Current packs/day: 0.00     Types: Cigarettes     Quit date:  1973     Years since quittin.5    Smokeless tobacco: Never   Substance and Sexual Activity    Alcohol use: Yes     Alcohol/week: 1.0 standard drink of alcohol     Comment: Alcoholic Drinks/day: daily with dinner    Drug use: No    Sexual activity: Not on file   Other Topics Concern    Not on file   Social History Narrative    Not on file     Social Determinants of Health     Financial Resource Strain: Not on file   Food Insecurity: Not on file   Transportation Needs: Not on file   Physical Activity: Not on file   Stress: Not on file   Social Connections: Not on file   Interpersonal Safety: Not on file   Housing Stability: Not on file           Medications  Allergies   Current Outpatient Medications   Medication Sig Dispense Refill    aspirin 81 MG EC tablet [ASPIRIN 81 MG EC TABLET] Take 1 tablet (81 mg total) by mouth daily.      atorvastatin (LIPITOR) 80 MG tablet [ATORVASTATIN (LIPITOR) 80 MG TABLET] Take 80 mg by mouth daily.      betamethasone dipropionate (DIPROLENE) 0.05 % cream [BETAMETHASONE DIPROPIONATE (DIPROLENE) 0.05 % CREAM] Apply 1 application topically 2 (two) times a day as needed.      fluticasone (FLONASE) 50 mcg/actuation nasal spray [FLUTICASONE (FLONASE) 50 MCG/ACTUATION NASAL SPRAY] Apply 2 sprays into each nostril every evening.       glucosamine sulfate (GLUCOSAMINE) 750 mg Tab [GLUCOSAMINE SULFATE (GLUCOSAMINE) 750 MG TAB] Take 1,500 mg by mouth daily.       ipratropium (ATROVENT) 0.06 % nasal spray [IPRATROPIUM (ATROVENT) 0.06 % NASAL SPRAY] Apply 2 sprays into each nostril 2 (two) times a day.      melatonin 3 mg Tab [MELATONIN 3 MG TAB] Take 6 mg by mouth bedtime.       methylcellulose (CITRUCEL) Take 2 g by mouth daily as needed       metoprolol tartrate (LOPRESSOR) 25 MG tablet TAKE 1 TABLET BY MOUTH TWICE A  tablet 3    nitroglycerin (NITROSTAT) 0.4 MG SL tablet [NITROGLYCERIN (NITROSTAT) 0.4 MG SL TABLET] Place 0.4 mg under the tongue every 5 (five) minutes as needed  "for chest pain.      omeprazole (PRILOSEC) 20 MG capsule [OMEPRAZOLE (PRILOSEC) 20 MG CAPSULE] Take 20 mg by mouth daily.      sodium chloride (OCEAN) 0.65 % nasal spray Spray 2 sprays into both nostrils 2 times daily Use 30 min before Atrovent spray.      vitamin A-vitamin C-vit E-min (OCUVITE) Tab tablet [VITAMIN A-VITAMIN C-VIT E-MIN (OCUVITE) TAB TABLET] Take 1 tablet by mouth daily.      apixaban ANTICOAGULANT (ELIQUIS) 2.5 MG tablet Take 1 tablet (2.5 mg) by mouth 2 times daily (Patient not taking: Reported on 7/1/2024) 60 tablet 0       Allergies   Allergen Reactions    Amiodarone Other (See Comments)     Fatigue, shaking    Amiodarone Analogues [Amiodarone] Muscle Pain (Myalgia)          Lab Results    Chemistry/lipid CBC Cardiac Enzymes/BNP/TSH/INR   Recent Labs   Lab Test 01/24/24  0855   CHOL 111   HDL 43   LDL 55   TRIG 63     Recent Labs   Lab Test 01/24/24  0855 01/12/23  0933 06/14/22  0931   LDL 55 50 65     Recent Labs   Lab Test 01/24/24  0855      POTASSIUM 4.6   CHLORIDE 100   CO2 27   GLC 89   BUN 21.8   CR 0.90   GFRESTIMATED 84   CHICHO 8.9     Recent Labs   Lab Test 01/24/24  0855 05/01/23  1206 04/25/23  0419   CR 0.90 0.92 0.70     No results for input(s): \"A1C\" in the last 63322 hours.       Recent Labs   Lab Test 04/25/23  0419   WBC 6.3   HGB 12.5*   HCT 36.9*   MCV 91   *     Recent Labs   Lab Test 04/25/23  0419 04/24/23  0844 04/23/23  0656   HGB 12.5* 12.0* 13.0*    Recent Labs   Lab Test 04/23/23  0015 04/22/23 2012 02/08/22  1213   TROPONINI 0.11 0.12 0.03     No results for input(s): \"BNP\", \"NTBNPI\", \"NTBNP\" in the last 80562 hours.  Recent Labs   Lab Test 06/14/22  0931   TSH 2.71     No results for input(s): \"INR\" in the last 30369 hours.       Medications  Allergies   Current Outpatient Medications   Medication Sig Dispense Refill    aspirin 81 MG EC tablet [ASPIRIN 81 MG EC TABLET] Take 1 tablet (81 mg total) by mouth daily.      atorvastatin (LIPITOR) 80 MG tablet " [ATORVASTATIN (LIPITOR) 80 MG TABLET] Take 80 mg by mouth daily.      betamethasone dipropionate (DIPROLENE) 0.05 % cream [BETAMETHASONE DIPROPIONATE (DIPROLENE) 0.05 % CREAM] Apply 1 application topically 2 (two) times a day as needed.      fluticasone (FLONASE) 50 mcg/actuation nasal spray [FLUTICASONE (FLONASE) 50 MCG/ACTUATION NASAL SPRAY] Apply 2 sprays into each nostril every evening.       glucosamine sulfate (GLUCOSAMINE) 750 mg Tab [GLUCOSAMINE SULFATE (GLUCOSAMINE) 750 MG TAB] Take 1,500 mg by mouth daily.       ipratropium (ATROVENT) 0.06 % nasal spray [IPRATROPIUM (ATROVENT) 0.06 % NASAL SPRAY] Apply 2 sprays into each nostril 2 (two) times a day.      melatonin 3 mg Tab [MELATONIN 3 MG TAB] Take 6 mg by mouth bedtime.       methylcellulose (CITRUCEL) Take 2 g by mouth daily as needed       metoprolol tartrate (LOPRESSOR) 25 MG tablet TAKE 1 TABLET BY MOUTH TWICE A  tablet 3    nitroglycerin (NITROSTAT) 0.4 MG SL tablet [NITROGLYCERIN (NITROSTAT) 0.4 MG SL TABLET] Place 0.4 mg under the tongue every 5 (five) minutes as needed for chest pain.      omeprazole (PRILOSEC) 20 MG capsule [OMEPRAZOLE (PRILOSEC) 20 MG CAPSULE] Take 20 mg by mouth daily.      sodium chloride (OCEAN) 0.65 % nasal spray Spray 2 sprays into both nostrils 2 times daily Use 30 min before Atrovent spray.      vitamin A-vitamin C-vit E-min (OCUVITE) Tab tablet [VITAMIN A-VITAMIN C-VIT E-MIN (OCUVITE) TAB TABLET] Take 1 tablet by mouth daily.      apixaban ANTICOAGULANT (ELIQUIS) 2.5 MG tablet Take 1 tablet (2.5 mg) by mouth 2 times daily (Patient not taking: Reported on 7/1/2024) 60 tablet 0      Allergies   Allergen Reactions    Amiodarone Other (See Comments)     Fatigue, shaking    Amiodarone Analogues [Amiodarone] Muscle Pain (Myalgia)          Lab Results   Lab Results   Component Value Date     01/24/2024    CO2 27 01/24/2024    CO2 27 04/25/2023    BUN 21.8 01/24/2024    BUN 21 04/25/2023     Lab Results   Component  "Value Date    WBC 6.3 04/25/2023    HGB 12.5 04/25/2023    HCT 36.9 04/25/2023    MCV 91 04/25/2023     04/25/2023     Lab Results   Component Value Date    CHOL 111 01/24/2024    TRIG 63 01/24/2024    HDL 43 01/24/2024     No results found for: \"INR\"  No results found for: \"BNP\"  Lab Results   Component Value Date    TROPONINI 0.11 04/23/2023    TROPONINI 0.12 04/22/2023    TROPONINI 0.03 02/08/2022     Lab Results   Component Value Date    TSH 2.71 06/14/2022                      Thank you for allowing me to participate in the care of your patient.      Sincerely,     Renetta Crabtree MD     [facility]  cc:   Renetta Crabtree MD  [address]  [place] [address]      "

## 2024-07-08 ENCOUNTER — TELEPHONE (OUTPATIENT)
Dept: CARDIOLOGY | Facility: CLINIC | Age: 84
End: 2024-07-08
Payer: MEDICARE

## 2024-07-08 NOTE — TELEPHONE ENCOUNTER
----- Message from Bambi ROCHE sent at 7/8/2024 12:09 PM CDT -----  Regarding: FW: RELAUREEN - STRESS MRI  Cardiac MRI 8/13 remind me sent.  ----- Message -----  From: Renetta Crabtree MD  Sent: 7/3/2024   4:41 PM CDT  To: Bambi Edwards RN  Subject: RE: REELVISORF - STRESS MRI                        Yes, that would be fine. Thank you!  ----- Message -----  From: Bambi Edwards RN  Sent: 7/2/2024   2:10 PM CDT  To: Renetta Crabtree MD  Subject: FW: MARISOL - STRESS MRI                        Dr. Crabtree,  Please see note below.  Valium 5 mg tablet?  Thank you,  Bambi  ----- Message -----  From: Shiela Martinez  Sent: 7/2/2024   2:02 PM CDT  To: Bambi Edwards RN  Subject: REISDBART - STRESS MRI                            ..General phone call:    Caller: Damian  Primary cardiologist: Dr. Crabtree  Detailed reason for call: Pt is scheduled for the Stress MRI that Dr. Crabtree ordered. Pt states that he might be claustrophobic, and would like a sedation medication, if possible. Could you please call him to discuss?  New or active symptoms? No  Best phone number: [phone]  Best time to contact: Any  Ok to leave a detailedmessage? Yes  Device? No    Additional Info:

## 2024-07-18 ENCOUNTER — LAB REQUISITION (OUTPATIENT)
Dept: LAB | Facility: CLINIC | Age: 84
End: 2024-07-18
Payer: MEDICARE

## 2024-07-18 DIAGNOSIS — I10 ESSENTIAL (PRIMARY) HYPERTENSION: ICD-10-CM

## 2024-07-18 LAB
ANION GAP SERPL CALCULATED.3IONS-SCNC: 11 MMOL/L (ref 7–15)
BUN SERPL-MCNC: 17.7 MG/DL (ref 8–23)
CALCIUM SERPL-MCNC: 8.9 MG/DL (ref 8.8–10.4)
CHLORIDE SERPL-SCNC: 99 MMOL/L (ref 98–107)
CREAT SERPL-MCNC: 0.93 MG/DL (ref 0.67–1.17)
EGFRCR SERPLBLD CKD-EPI 2021: 80 ML/MIN/1.73M2
GLUCOSE SERPL-MCNC: 73 MG/DL (ref 70–99)
HCO3 SERPL-SCNC: 25 MMOL/L (ref 22–29)
POTASSIUM SERPL-SCNC: 4.4 MMOL/L (ref 3.4–5.3)
SODIUM SERPL-SCNC: 135 MMOL/L (ref 135–145)

## 2024-07-18 PROCEDURE — 80048 BASIC METABOLIC PNL TOTAL CA: CPT | Mod: ORL | Performed by: STUDENT IN AN ORGANIZED HEALTH CARE EDUCATION/TRAINING PROGRAM

## 2024-08-12 ENCOUNTER — TELEPHONE (OUTPATIENT)
Dept: CARDIOLOGY | Facility: CLINIC | Age: 84
End: 2024-08-12
Payer: MEDICARE

## 2024-08-12 NOTE — TELEPHONE ENCOUNTER
[facility] Call Center    Phone Message    May a detailed message be left on voicemail: yes     Reason for Call: Other: Patient called stating he has an MRI tomorrow and was told to stop taking metoprolol tartrate (LOPRESSOR) 25 MG tablet. Patient states he believes this will cause issues. Please call back to address.     Action Taken: Message routed to:  Other: Cardiology    Travel Screening: Not Applicable     Thank you!  [facility]

## 2024-08-12 NOTE — TELEPHONE ENCOUNTER
PC to patient to discuss holding metoprolol tartrate for cardiac MRI in the morning. Patient is concerned he will go into a faster rythym overnight if he does not take his suppertime dose.  Patient will take his dose this evening at 4 or 5 pm and will plan on holding his morning dose as instructed. No further questions at this time.

## 2024-08-12 NOTE — LETTER
7/29/2021    Dada Sethi MD  [facility] [address] [place] [address]    RE: Damian Butler       Dear Colleague,    I had the pleasure of seeing Damian Butler in the [facility].           [facility]   [address], [place] [address]   [email or web address]   OFFICE: [phone]          Thank you Dada Ricks for asking the [facility] team to participate in the care of your patient, Damian Butler.     Impression and Plan     1. Coronary artery disease. Roderick has known coronary disease, having suffered a myocardial infarction in 1997. At that time, he underwent 2-vessel bypass surgery with tiny graft to the LAD and ESPERANZA graft to the RCA.     More recently, patient underwent repeat angiography and on 5 July 2018 had successful PCI of distal right coronary artery via ESPERANZA graft with placement of 3 drug-eluting stents (3.5×28 mm, 3.5×24 mm, and 3.0×12 mm Synergy drug-eluting stents).      This is stable. Patient reports no chest pain or other concerning symptoms in this regard.     2. History of supraventricular tachycardia.  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  Patient underwent repeat ablation 7 May 2019.  He states he has had no subjective recurrence since his last procedure.  He is very pleased with how he is performing in this regard.     3.  Aortic insufficiency.  This was felt mild in degree on echocardiogram 4 years ago.  Significant progression is not suggested.  Somewhat of an increase his murmur, however.  Plan:    Echocardiogram.    4.  Hypertension.  Blood pressure is very reasonable in the office today at 118/58 mmHg.     5. Dyslipidemia.  Lipid profile 14 June 2021 was at/near target with LDL 65 mg/dL and HDL 39 mg/dL.     Tentatively will plan on seeing Daiman again in 1  Discharge panning    Note from RN that patient jardiance is 316 dollars. She will need to address with cardiology    year.        30 minutes spent reviewing prior records (including documentation, laboratory studies, cardiac testing/imaging), interview with patient along with physical exam, planning, and subsequent documentation/crafting of note.           History of Present Illness    Once again I would like to thank you again for asking me to participate in the care of your patient, Damian Butler.  As you know, but to reiterate for my own records, Damian Butler is a 80 year old male with known coronary disease, having suffered a myocardial infarction in 1997. At that time, he underwent 2-vessel bypass surgery with tiny graft to the LAD and ESPERANZA graft to the RCA. Historically, he has had well-preserved LV systolic performance.      More recently, patient underwent repeat angiography and on 5 July 2018 had successful PCI of distal right coronary artery via ESPERANZA graft with placement of 3 drug-eluting stents (3.5×28 mm, 3.5×24 mm, and 3.0×12 mm Synergy drug-eluting stents).     He also has a history of having AV moy reentry tachycardia for which he underwent ablative therapy in April 2010.  Patient underwent repeat ablation for SVT 7 May 2019.     In follow-up today, Damian states that he has been doing very well from a cardiac standpoint.  He denies chest pain or shortness of breath.  Exercise tolerance is stable.  He denies any subjective recurrence of SVT after his last ablation.  He denies any fevers, chills, or other constitutional symptoms.    Further review of systems is otherwise negative/noncontributory (medical record and 13 point review of systems reviewed as well and pertinent positives noted).         Cardiac Diagnostics      Echocardiogram 26 March 2017:  1. Normal left ventricular size and systolic performance with ejection fraction of 55%.  2. Mild aortic insufficiency.  3. Normal right ventricular size and systolic performance.  4. Mild left atrial enlargement.    Echocardiogram 20 November 2009 (stress  echocardiogram):  1. Normal LV size and function.  2. No significant valvular heart disease. ?  3. There was no evidence of ischemia.    Nuclear perfusion imaging study 22 June 2018 (pre-coronary angiogram):  1. Medium-sized area of mixed ischemia and nontransmural infarction in the inferior and inferolateral segments.  2. Normal left ventricular systolic performance with ejection fraction of 66%.    Nuclear stress perfusion studies 12 November 2010 :  1. Splanchnic artifact, though there was a small-medium sized inferior and inferolateral defect consistent with small infarction and small area of ischemia.  2. Normal left ventricular systolic performance with ejection fraction of 56%. ?  3. It is felt unchanged from March of 2009.    Coronary angiography 5 July 2018:  1. Successful PCI of distal right coronary artery via ESPERANZA graft with placement of 3 drug-eluting stents (3.5×28 mm, 3.5×24 mm, and 3.0×12 mm Synergy drug-eluting stents).    Coronary angiography 29 June 2018:  1. Left Main coronary artery: 10% stenosis.  2. Left anterior descending coronary artery: Proximal-mid 100% stenosis.  First a 50% stenosis.  3. Circumflex coronary artery: Moderate size vessel and angiographically normal.  4. Right coronary artery: 100% mid stenosis with distal 80% stenosis.  5. ESPERANZA graft to mid right coronary artery is large and angiographically normal.  6. LIMA graft to mid LAD is large and angiographically normal.    One-patch monitor 16 April 2018 through 16 May 2018:  1. The palpitations and rapid heart beating associated with supraventricular tachycardia, probably AV moy reentry rate 115 beats per minute on 04/29/2018 and 05/01/2018.    2. On other occasions palpitations were associated with ventricular premature beats or ventricular couplet or with sinus rhythm or sinus tachycardia without ectopy.    24-hour Holter monitor 19 February 2011:  1. Frequent PVCs suggestive of inferior left ventricular origin.            Physical Examination       /58 (BP Location: Left arm, Patient Position: Sitting, Cuff Size: Adult Regular)   Pulse 62   Resp 16   Wt 76.2 kg (168 lb)   SpO2 96%   BMI 24.81 kg/m          Wt Readings from Last 3 Encounters:   07/29/21 76.2 kg (168 lb)   07/08/19 75.8 kg (167 lb 1 oz)   05/07/19 75.2 kg (165 lb 11.2 oz)     The patient is alert and oriented times three. Sclerae are anicteric. Mucosal membranes are moist. Jugular venous pressure is normal. No significant adenopathy/thyromegally appreciated. Lungs are clear with good expansion. On cardiovascular exam, the patient has a regular S1 and S2.  2/6 systolic murmur heard at right sternal border.  Abdomen is soft and non-tender. Extremities reveal no clubbing, cyanosis, or edema.         Medications  Allergies   Current Outpatient Medications   Medication Sig Dispense Refill     aspirin 81 MG EC tablet [ASPIRIN 81 MG EC TABLET] Take 1 tablet (81 mg total) by mouth daily.       atorvastatin (LIPITOR) 80 MG tablet [ATORVASTATIN (LIPITOR) 80 MG TABLET] Take 80 mg by mouth daily.       betamethasone dipropionate (DIPROLENE) 0.05 % cream [BETAMETHASONE DIPROPIONATE (DIPROLENE) 0.05 % CREAM] Apply 1 application topically 2 (two) times a day as needed.       desonide (DESOWEN) 0.05 % cream [DESONIDE (DESOWEN) 0.05 % CREAM] Apply topically 2 (two) times a day as needed.       fluticasone (FLONASE) 50 mcg/actuation nasal spray [FLUTICASONE (FLONASE) 50 MCG/ACTUATION NASAL SPRAY] Apply 2 sprays into each nostril every evening.        glucosamine sulfate (GLUCOSAMINE) 750 mg Tab [GLUCOSAMINE SULFATE (GLUCOSAMINE) 750 MG TAB] Take 1,500 mg by mouth daily.        ipratropium (ATROVENT) 0.06 % nasal spray [IPRATROPIUM (ATROVENT) 0.06 % NASAL SPRAY] Apply 2 sprays into each nostril 2 (two) times a day.       melatonin 3 mg Tab [MELATONIN 3 MG TAB] Take 6 mg by mouth bedtime.        methylcellulose (CITRUCEL) Take 2 g by mouth every other day         metoprolol tartrate (LOPRESSOR) 25 MG tablet [METOPROLOL TARTRATE (LOPRESSOR) 25 MG TABLET] TAKE 1 TABLET BY MOUTH TWICE A  tablet 1     multivitamin (MULTIVITAMIN) per tablet [MULTIVITAMIN (MULTIVITAMIN) PER TABLET] Take 1 tablet by mouth daily.       nitroglycerin (NITROSTAT) 0.4 MG SL tablet [NITROGLYCERIN (NITROSTAT) 0.4 MG SL TABLET] Place 0.4 mg under the tongue every 5 (five) minutes as needed for chest pain.       omeprazole (PRILOSEC) 20 MG capsule [OMEPRAZOLE (PRILOSEC) 20 MG CAPSULE] Take 20 mg by mouth daily.       sodium chloride (OCEAN) 0.65 % nasal spray Spray 2 sprays into both nostrils 2 times daily        vitamin A-vitamin C-vit E-min (OCUVITE) Tab tablet [VITAMIN A-VITAMIN C-VIT E-MIN (OCUVITE) TAB TABLET] Take 1 tablet by mouth daily.         Allergies   Allergen Reactions     Amiodarone Other (See Comments)     Fatigue, shaking     Amiodarone Analogues [Amiodarone] Muscle Pain (Myalgia)          Lab Results    Chemistry/lipid CBC Cardiac Enzymes/BNP/TSH/INR   Recent Labs   Lab Test 06/14/21  0912   CHOL 114   HDL 39*   LDL 65   TRIG 52     Recent Labs   Lab Test 06/14/21  0912 12/14/20  0850 12/12/19  0830   LDL 65 64 57     Recent Labs   Lab Test 06/14/21  0912      POTASSIUM 5.0   CHLORIDE 102   CO2 25   GLC 83   BUN 18   CR 0.86   GFRESTIMATED >60   CHICHO 8.3*     Recent Labs   Lab Test 06/14/21  0912 12/14/20  0850 06/16/20  1158   CR 0.86 0.83 0.89     No results for input(s): A1C in the last 09650 hours.       Recent Labs   Lab Test 05/07/19 0614   WBC 8.3   HGB 14.2   HCT 42.9   MCV 94        Recent Labs   Lab Test 05/07/19  0614 07/06/18  0554 07/05/18  0714   HGB 14.2 12.2* 12.7*    No results for input(s): TROPONINI in the last 44063 hours.  No results for input(s): BNP, NTBNPI, NTBNP in the last 28327 hours.  No results for input(s): TSH in the last 00306 hours.  No results for input(s): INR in the last 96397 hours.     Medical History  Surgical History Family  History Social History   No past medical history on file.  Past Surgical History:   Procedure Laterality Date     ABLATION OF DYSRHYTHMIC FOCUS  2019    Typical AV moy reentry     BACK SURGERY       BYPASS GRAFT ARTERY CORONARY  1997    Comments: X 2 LIMA to LAD ESPERANZA to RCA     CARDIAC ELECTROPHYSIOLOGY MAPPING AND ABLATION       CV CORONARY ANGIOGRAM N/A 2018    Procedure: Coronary Angiogram;  Surgeon: Carito Flannery MD;  Location: [facility] Cath Lab;  Service:      CV CORONARY ANGIOGRAM N/A 2018    Procedure: Coronary Angiogram;  Surgeon: Carito Flannery MD;  Location: [facility] Cath Lab;  Service:      CV LEFT HEART CATHETERIZATION WITHOUT LEFT VENTRICULOGRAM Left 2018    Procedure: Left Heart Catheterization Without Left Ventriculogram;  Surgeon: Carito Flannery MD;  Location: [facility] Cath Lab;  Service:      EP ABLATION SVT N/A 2019    Procedure: EP Ablation Supra Ventricular;  Surgeon: Steve Leyva MD;  Location: [facility] Cath Lab;  Service: Cardiology     HAND SURGERY       HC REPAIR EPIGASTRIC HERNIA,REDUC N/A 2014    Procedure: EPIGASTRIC INCISIONAL HERNIA REPAIR ;  Surgeon: Daniel Gutierrez MD;  Location: [facility] OR;  Service: General     HERNIA REPAIR       MOHS MICROGRAPHIC PROCEDURE       DC ABLATE HEART DYSRHYTHM FOCUS      Description: Catheter Ablation Atrial Supraventricular Tachycardia;  Proc Date: 2010;  Comments: AVNRT  cryoablation     TONSILLECTOMY       Family History   Problem Relation Age of Onset     Hypertension Father         Social History     Socioeconomic History     Marital status:      Spouse name: Not on file     Number of children: Not on file     Years of education: Not on file     Highest education level: Not on file   Occupational History     Not on file   Tobacco Use     Smoking status: Former Smoker     Quit date: 1973     Years since quittin.6     Smokeless tobacco: Never Used   Substance and  Sexual Activity     Alcohol use: Yes     Alcohol/week: 1.0 standard drinks     Comment: Alcoholic Drinks/day: daily with dinner     Drug use: No     Sexual activity: Not on file   Other Topics Concern     Not on file   Social History Narrative     Not on file     Social Determinants of Health     Financial Resource Strain:      Difficulty of Paying Living Expenses:    Food Insecurity:      Worried About Running Out of Food in the Last Year:      Ran Out of Food in the Last Year:    Transportation Needs:      Lack of Transportation (Medical):      Lack of Transportation (Non-Medical):    Physical Activity:      Days of Exercise per Week:      Minutes of Exercise per Session:    Stress:      Feeling of Stress :    Social Connections:      Frequency of Communication with Friends and Family:      Frequency of Social Gatherings with Friends and Family:      Attends Taoist Services:      Active Member of Clubs or Organizations:      Attends Club or Organization Meetings:      Marital Status:    Intimate Partner Violence:      Fear of Current or Ex-Partner:      Emotionally Abused:      Physically Abused:      Sexually Abused:                       Thank you for allowing me to participate in the care of your patient.      Sincerely,     Renetta Crabtree MD     [facility]  cc:   No referring provider defined for this encounter.

## 2024-08-13 ENCOUNTER — HOSPITAL ENCOUNTER (OUTPATIENT)
Dept: MRI IMAGING | Facility: HOSPITAL | Age: 84
Discharge: HOME | End: 2024-08-13
Attending: INTERNAL MEDICINE
Payer: MEDICARE

## 2024-08-13 VITALS — HEART RATE: 83 BPM | SYSTOLIC BLOOD PRESSURE: 140 MMHG | DIASTOLIC BLOOD PRESSURE: 67 MMHG | OXYGEN SATURATION: 99 %

## 2024-08-13 DIAGNOSIS — R07.2 PRECORDIAL PAIN: ICD-10-CM

## 2024-08-13 DIAGNOSIS — I47.10 SVT (SUPRAVENTRICULAR TACHYCARDIA) (H): ICD-10-CM

## 2024-08-13 DIAGNOSIS — R94.39 ABNORMAL NUCLEAR STRESS TEST: ICD-10-CM

## 2024-08-13 DIAGNOSIS — I25.10 CORONARY ARTERY DISEASE INVOLVING NATIVE CORONARY ARTERY OF NATIVE HEART WITHOUT ANGINA PECTORIS: ICD-10-CM

## 2024-08-13 DIAGNOSIS — R06.09 DOE (DYSPNEA ON EXERTION): ICD-10-CM

## 2024-08-13 DIAGNOSIS — J44.9 CHRONIC OBSTRUCTIVE PULMONARY DISEASE, UNSPECIFIED COPD TYPE (H): ICD-10-CM

## 2024-08-13 DIAGNOSIS — I25.10 CAD (CORONARY ARTERY DISEASE): Primary | ICD-10-CM

## 2024-08-13 LAB
ATRIAL RATE - MUSE: 66 BPM
ATRIAL RATE - MUSE: 87 BPM
DIASTOLIC BLOOD PRESSURE - MUSE: NORMAL MMHG
DIASTOLIC BLOOD PRESSURE - MUSE: NORMAL MMHG
INTERPRETATION ECG - MUSE: NORMAL
INTERPRETATION ECG - MUSE: NORMAL
P AXIS - MUSE: 63 DEGREES
P AXIS - MUSE: 70 DEGREES
PR INTERVAL - MUSE: 216 MS
PR INTERVAL - MUSE: 230 MS
QRS DURATION - MUSE: 102 MS
QRS DURATION - MUSE: 98 MS
QT - MUSE: 380 MS
QT - MUSE: 406 MS
QTC - MUSE: 425 MS
QTC - MUSE: 457 MS
R AXIS - MUSE: 84 DEGREES
R AXIS - MUSE: 90 DEGREES
SYSTOLIC BLOOD PRESSURE - MUSE: NORMAL MMHG
SYSTOLIC BLOOD PRESSURE - MUSE: NORMAL MMHG
T AXIS - MUSE: 18 DEGREES
T AXIS - MUSE: 27 DEGREES
VENTRICULAR RATE- MUSE: 66 BPM
VENTRICULAR RATE- MUSE: 87 BPM

## 2024-08-13 PROCEDURE — 93005 ELECTROCARDIOGRAM TRACING: CPT

## 2024-08-13 PROCEDURE — A9585 GADOBUTROL INJECTION: HCPCS | Performed by: INTERNAL MEDICINE

## 2024-08-13 PROCEDURE — 250N000011 HC RX IP 250 OP 636: Performed by: INTERNAL MEDICINE

## 2024-08-13 PROCEDURE — G1010 CDSM STANSON: HCPCS | Performed by: GENERAL ACUTE CARE HOSPITAL

## 2024-08-13 PROCEDURE — 999N000122 MR MYOCARDIUM  OVERREAD

## 2024-08-13 PROCEDURE — 93010 ELECTROCARDIOGRAM REPORT: CPT | Mod: HOP | Performed by: INTERNAL MEDICINE

## 2024-08-13 PROCEDURE — 255N000002 HC RX 255 OP 636: Performed by: INTERNAL MEDICINE

## 2024-08-13 PROCEDURE — 75563 CARD MRI W/STRESS IMG & DYE: CPT | Mod: ME

## 2024-08-13 PROCEDURE — 93018 CV STRESS TEST I&R ONLY: CPT | Performed by: GENERAL ACUTE CARE HOSPITAL

## 2024-08-13 PROCEDURE — 93010 ELECTROCARDIOGRAM REPORT: CPT | Mod: HOP | Performed by: STUDENT IN AN ORGANIZED HEALTH CARE EDUCATION/TRAINING PROGRAM

## 2024-08-13 PROCEDURE — 93016 CV STRESS TEST SUPVJ ONLY: CPT | Performed by: INTERNAL MEDICINE

## 2024-08-13 PROCEDURE — 75563 CARD MRI W/STRESS IMG & DYE: CPT | Mod: 26 | Performed by: GENERAL ACUTE CARE HOSPITAL

## 2024-08-13 RX ORDER — REGADENOSON 0.08 MG/ML
0.4 INJECTION, SOLUTION INTRAVENOUS ONCE
Status: COMPLETED | OUTPATIENT
Start: 2024-08-13 | End: 2024-08-13

## 2024-08-13 RX ORDER — GADOBUTROL 604.72 MG/ML
16 INJECTION INTRAVENOUS ONCE
Status: COMPLETED | OUTPATIENT
Start: 2024-08-13 | End: 2024-08-13

## 2024-08-13 RX ADMIN — GADOBUTROL 16 ML: 604.72 INJECTION INTRAVENOUS at 09:56

## 2024-08-13 RX ADMIN — REGADENOSON 0.4 MG: 0.08 INJECTION, SOLUTION INTRAVENOUS at 09:59

## 2024-08-20 DIAGNOSIS — I25.10 CORONARY ARTERY DISEASE DUE TO LIPID RICH PLAQUE: ICD-10-CM

## 2024-08-20 DIAGNOSIS — I47.10 SVT (SUPRAVENTRICULAR TACHYCARDIA) (H): ICD-10-CM

## 2024-08-20 DIAGNOSIS — I25.83 CORONARY ARTERY DISEASE DUE TO LIPID RICH PLAQUE: ICD-10-CM

## 2024-08-20 DIAGNOSIS — I35.0 AORTIC STENOSIS: Primary | ICD-10-CM

## 2025-01-20 ENCOUNTER — LAB REQUISITION (OUTPATIENT)
Dept: LAB | Facility: CLINIC | Age: 85
End: 2025-01-20
Payer: MEDICARE

## 2025-01-20 DIAGNOSIS — E78.5 HYPERLIPIDEMIA, UNSPECIFIED: ICD-10-CM

## 2025-01-20 DIAGNOSIS — E87.1 HYPO-OSMOLALITY AND HYPONATREMIA: ICD-10-CM

## 2025-01-20 PROCEDURE — 80053 COMPREHEN METABOLIC PANEL: CPT | Mod: ORL | Performed by: STUDENT IN AN ORGANIZED HEALTH CARE EDUCATION/TRAINING PROGRAM

## 2025-01-20 PROCEDURE — 80061 LIPID PANEL: CPT | Mod: ORL | Performed by: STUDENT IN AN ORGANIZED HEALTH CARE EDUCATION/TRAINING PROGRAM

## 2025-01-21 LAB
ALBUMIN SERPL BCG-MCNC: 4 G/DL (ref 3.5–5.2)
ALP SERPL-CCNC: 122 U/L (ref 40–150)
ALT SERPL W P-5'-P-CCNC: 20 U/L (ref 0–70)
ANION GAP SERPL CALCULATED.3IONS-SCNC: 11 MMOL/L (ref 7–15)
AST SERPL W P-5'-P-CCNC: 24 U/L (ref 0–45)
BILIRUB SERPL-MCNC: 0.6 MG/DL
BUN SERPL-MCNC: 20.9 MG/DL (ref 8–23)
CALCIUM SERPL-MCNC: 9.2 MG/DL (ref 8.8–10.4)
CHLORIDE SERPL-SCNC: 98 MMOL/L (ref 98–107)
CHOLEST SERPL-MCNC: 120 MG/DL
CREAT SERPL-MCNC: 0.89 MG/DL (ref 0.67–1.17)
EGFRCR SERPLBLD CKD-EPI 2021: 83 ML/MIN/1.73M2
FASTING STATUS PATIENT QL REPORTED: ABNORMAL
FASTING STATUS PATIENT QL REPORTED: NORMAL
GLUCOSE SERPL-MCNC: 100 MG/DL (ref 70–99)
HCO3 SERPL-SCNC: 25 MMOL/L (ref 22–29)
HDLC SERPL-MCNC: 43 MG/DL
LDLC SERPL CALC-MCNC: 58 MG/DL
NONHDLC SERPL-MCNC: 77 MG/DL
POTASSIUM SERPL-SCNC: 5.4 MMOL/L (ref 3.4–5.3)
PROT SERPL-MCNC: 7 G/DL (ref 6.4–8.3)
SODIUM SERPL-SCNC: 134 MMOL/L (ref 135–145)
TRIGL SERPL-MCNC: 95 MG/DL

## 2025-02-05 ENCOUNTER — TELEPHONE (OUTPATIENT)
Dept: CARDIOLOGY | Facility: CLINIC | Age: 85
End: 2025-02-05
Payer: MEDICARE

## 2025-02-05 NOTE — TELEPHONE ENCOUNTER
[facility] Call Center    Phone Message    May a detailed message be left on voicemail: yes    Reason for Call: Patient called requesting to speak with a member of his care team. Patient states he had medical questions and did not want to specify further. Please call back to further discuss.    Thank you!  [facility]

## 2025-02-05 NOTE — TELEPHONE ENCOUNTER
PC to patient to discuss his questions.  Patient had imaging at [facility] and patient was diagnosed with Reynauds.  Patient was wondering if Dr. Crabtree had been notified of results. Patient would like Dr. Crabtree to review testing and advise.  Instructed patient to obtain records from [facility] to be forwarded to Dr. Crabtree.  Patient verbalized understanding and will call [place] to get records.

## 2025-03-26 DIAGNOSIS — I10 HYPERTENSION: ICD-10-CM

## 2025-03-26 RX ORDER — METOPROLOL TARTRATE 25 MG/1
25 TABLET, FILM COATED ORAL 2 TIMES DAILY
Qty: 180 TABLET | Refills: 0 | Status: SHIPPED | OUTPATIENT
Start: 2025-03-26

## 2025-06-28 DIAGNOSIS — I10 HYPERTENSION: ICD-10-CM

## 2025-06-30 RX ORDER — METOPROLOL TARTRATE 25 MG/1
25 TABLET, FILM COATED ORAL 2 TIMES DAILY
Qty: 180 TABLET | Refills: 0 | Status: SHIPPED | OUTPATIENT
Start: 2025-06-30

## 2025-07-24 ENCOUNTER — LAB REQUISITION (OUTPATIENT)
Dept: LAB | Facility: CLINIC | Age: 85
End: 2025-07-24
Payer: MEDICARE

## 2025-07-24 DIAGNOSIS — R26.89 OTHER ABNORMALITIES OF GAIT AND MOBILITY: ICD-10-CM

## 2025-07-24 DIAGNOSIS — I10 ESSENTIAL (PRIMARY) HYPERTENSION: ICD-10-CM

## 2025-07-24 LAB
ANION GAP SERPL CALCULATED.3IONS-SCNC: 11 MMOL/L (ref 7–15)
BUN SERPL-MCNC: 18.2 MG/DL (ref 8–23)
CALCIUM SERPL-MCNC: 9.2 MG/DL (ref 8.8–10.4)
CHLORIDE SERPL-SCNC: 96 MMOL/L (ref 98–107)
CREAT SERPL-MCNC: 0.91 MG/DL (ref 0.67–1.17)
EGFRCR SERPLBLD CKD-EPI 2021: 82 ML/MIN/1.73M2
GLUCOSE SERPL-MCNC: 94 MG/DL (ref 70–99)
HCO3 SERPL-SCNC: 25 MMOL/L (ref 22–29)
POTASSIUM SERPL-SCNC: 5.7 MMOL/L (ref 3.4–5.3)
PROT SERPL-MCNC: 6.6 G/DL (ref 6.4–8.3)
SODIUM SERPL-SCNC: 132 MMOL/L (ref 135–145)
TSH SERPL DL<=0.005 MIU/L-ACNC: 3.27 UIU/ML (ref 0.3–4.2)
VIT B12 SERPL-MCNC: 1211 PG/ML (ref 232–1245)

## 2025-07-24 PROCEDURE — 82607 VITAMIN B-12: CPT | Mod: ORL | Performed by: STUDENT IN AN ORGANIZED HEALTH CARE EDUCATION/TRAINING PROGRAM

## 2025-07-24 PROCEDURE — 84155 ASSAY OF PROTEIN SERUM: CPT | Mod: ORL | Performed by: STUDENT IN AN ORGANIZED HEALTH CARE EDUCATION/TRAINING PROGRAM

## 2025-07-24 PROCEDURE — 84165 PROTEIN E-PHORESIS SERUM: CPT | Mod: TC,ORL | Performed by: PATHOLOGY

## 2025-07-24 PROCEDURE — 80048 BASIC METABOLIC PNL TOTAL CA: CPT | Mod: ORL | Performed by: STUDENT IN AN ORGANIZED HEALTH CARE EDUCATION/TRAINING PROGRAM

## 2025-07-24 PROCEDURE — 84443 ASSAY THYROID STIM HORMONE: CPT | Mod: ORL | Performed by: STUDENT IN AN ORGANIZED HEALTH CARE EDUCATION/TRAINING PROGRAM

## 2025-07-25 LAB
ALBUMIN SERPL ELPH-MCNC: 3.9 G/DL (ref 3.7–5.1)
ALPHA1 GLOB SERPL ELPH-MCNC: 0.3 G/DL (ref 0.2–0.4)
ALPHA2 GLOB SERPL ELPH-MCNC: 0.6 G/DL (ref 0.5–0.9)
B-GLOBULIN SERPL ELPH-MCNC: 0.7 G/DL (ref 0.6–1)
GAMMA GLOB SERPL ELPH-MCNC: 1.1 G/DL (ref 0.7–1.6)
M PROTEIN SERPL ELPH-MCNC: 0 G/DL
PROT PATTERN SERPL ELPH-IMP: NORMAL

## 2025-07-25 PROCEDURE — 84165 PROTEIN E-PHORESIS SERUM: CPT | Mod: 26 | Performed by: PATHOLOGY

## 2025-08-11 ENCOUNTER — LAB REQUISITION (OUTPATIENT)
Dept: LAB | Facility: CLINIC | Age: 85
End: 2025-08-11
Payer: MEDICARE

## 2025-08-11 DIAGNOSIS — R60.0 LOCALIZED EDEMA: ICD-10-CM

## 2025-08-11 LAB
ANION GAP SERPL CALCULATED.3IONS-SCNC: 11 MMOL/L (ref 7–15)
BUN SERPL-MCNC: 25.1 MG/DL (ref 8–23)
CALCIUM SERPL-MCNC: 9.1 MG/DL (ref 8.8–10.4)
CHLORIDE SERPL-SCNC: 100 MMOL/L (ref 98–107)
CREAT SERPL-MCNC: 1.1 MG/DL (ref 0.67–1.17)
EGFRCR SERPLBLD CKD-EPI 2021: 65 ML/MIN/1.73M2
GLUCOSE SERPL-MCNC: 96 MG/DL (ref 70–99)
HCO3 SERPL-SCNC: 27 MMOL/L (ref 22–29)
POTASSIUM SERPL-SCNC: 4.4 MMOL/L (ref 3.4–5.3)
SODIUM SERPL-SCNC: 138 MMOL/L (ref 135–145)

## 2025-08-11 PROCEDURE — 80048 BASIC METABOLIC PNL TOTAL CA: CPT | Mod: ORL | Performed by: STUDENT IN AN ORGANIZED HEALTH CARE EDUCATION/TRAINING PROGRAM

## 2025-08-20 ENCOUNTER — HOSPITAL ENCOUNTER (OUTPATIENT)
Dept: CARDIOLOGY | Facility: CLINIC | Age: 85
Discharge: HOME | End: 2025-08-20
Attending: INTERNAL MEDICINE
Payer: MEDICARE

## 2025-08-20 DIAGNOSIS — I47.10 SVT (SUPRAVENTRICULAR TACHYCARDIA): ICD-10-CM

## 2025-08-20 DIAGNOSIS — I25.83 CORONARY ARTERY DISEASE DUE TO LIPID RICH PLAQUE: ICD-10-CM

## 2025-08-20 DIAGNOSIS — I25.10 CORONARY ARTERY DISEASE DUE TO LIPID RICH PLAQUE: ICD-10-CM

## 2025-08-20 DIAGNOSIS — I35.0 AORTIC STENOSIS: ICD-10-CM

## 2025-08-20 LAB — LVEF ECHO: NORMAL

## 2025-08-20 PROCEDURE — 93306 TTE W/DOPPLER COMPLETE: CPT

## 2025-08-20 PROCEDURE — 93306 TTE W/DOPPLER COMPLETE: CPT | Mod: 26 | Performed by: INTERNAL MEDICINE
